# Patient Record
Sex: FEMALE | Race: OTHER | HISPANIC OR LATINO
[De-identification: names, ages, dates, MRNs, and addresses within clinical notes are randomized per-mention and may not be internally consistent; named-entity substitution may affect disease eponyms.]

---

## 2017-01-22 ENCOUNTER — RESULT REVIEW (OUTPATIENT)
Age: 42
End: 2017-01-22

## 2017-01-23 ENCOUNTER — APPOINTMENT (OUTPATIENT)
Dept: UROGYNECOLOGY | Facility: CLINIC | Age: 42
End: 2017-01-23

## 2017-01-23 VITALS
BODY MASS INDEX: 28.43 KG/M2 | SYSTOLIC BLOOD PRESSURE: 100 MMHG | DIASTOLIC BLOOD PRESSURE: 70 MMHG | HEIGHT: 59 IN | WEIGHT: 141 LBS

## 2017-01-23 DIAGNOSIS — R35.1 NOCTURIA: ICD-10-CM

## 2017-01-23 DIAGNOSIS — N39.46 MIXED INCONTINENCE: ICD-10-CM

## 2017-01-24 LAB
APPEARANCE UR: NORMAL
BILIRUB UR QL STRIP: NEGATIVE
COLOR UR: YELLOW
GLUCOSE UR STRIP-MCNC: NEGATIVE MG/DL
HGB UR QL STRIP: NEGATIVE
KETONES UR STRIP-MCNC: NEGATIVE MG/DL
NITRITE UR QL STRIP: NEGATIVE
PH UR STRIP: 5.5
PROT UR STRIP-MCNC: NEGATIVE MG/DL
SP GR UR STRIP: 1.02
UROBILINOGEN UR STRIP-MCNC: 0.2 MG/DL
WBC URNS QL MICRO: NEGATIVE

## 2017-01-26 LAB — BACTERIA UR CULT: NORMAL

## 2017-01-30 ENCOUNTER — MEDICATION RENEWAL (OUTPATIENT)
Age: 42
End: 2017-01-30

## 2017-02-06 ENCOUNTER — OTHER (OUTPATIENT)
Age: 42
End: 2017-02-06

## 2017-02-06 RX ORDER — OXYBUTYNIN CHLORIDE 5 MG/1
5 TABLET ORAL
Qty: 28 | Refills: 0 | Status: DISCONTINUED | COMMUNITY
Start: 2017-01-30 | End: 2017-02-06

## 2017-02-09 ENCOUNTER — APPOINTMENT (OUTPATIENT)
Dept: INTERNAL MEDICINE | Facility: CLINIC | Age: 42
End: 2017-02-09

## 2017-02-23 ENCOUNTER — APPOINTMENT (OUTPATIENT)
Dept: INTERNAL MEDICINE | Facility: CLINIC | Age: 42
End: 2017-02-23

## 2017-02-23 VITALS
DIASTOLIC BLOOD PRESSURE: 84 MMHG | WEIGHT: 144.5 LBS | HEART RATE: 79 BPM | BODY MASS INDEX: 29.13 KG/M2 | HEIGHT: 59 IN | SYSTOLIC BLOOD PRESSURE: 129 MMHG

## 2017-02-23 DIAGNOSIS — K80.20 CALCULUS OF GALLBLADDER W/OUT CHOLECYSTITIS W/OUT OBSTRUCTION: ICD-10-CM

## 2017-02-23 DIAGNOSIS — Z87.42 PERSONAL HISTORY OF OTHER DISEASES OF THE FEMALE GENITAL TRACT: ICD-10-CM

## 2017-03-01 ENCOUNTER — APPOINTMENT (OUTPATIENT)
Dept: UROGYNECOLOGY | Facility: CLINIC | Age: 42
End: 2017-03-01

## 2017-03-10 ENCOUNTER — OUTPATIENT (OUTPATIENT)
Dept: OUTPATIENT SERVICES | Facility: HOSPITAL | Age: 42
LOS: 1 days | Discharge: HOME | End: 2017-03-10

## 2017-03-10 ENCOUNTER — APPOINTMENT (OUTPATIENT)
Dept: SURGERY | Facility: AMBULATORY SURGERY CENTER | Age: 42
End: 2017-03-10

## 2017-03-21 ENCOUNTER — APPOINTMENT (OUTPATIENT)
Dept: SURGERY | Facility: CLINIC | Age: 42
End: 2017-03-21

## 2017-03-24 ENCOUNTER — APPOINTMENT (OUTPATIENT)
Dept: UROGYNECOLOGY | Facility: CLINIC | Age: 42
End: 2017-03-24

## 2017-06-08 ENCOUNTER — APPOINTMENT (OUTPATIENT)
Dept: INTERNAL MEDICINE | Facility: CLINIC | Age: 42
End: 2017-06-08

## 2017-06-22 ENCOUNTER — APPOINTMENT (OUTPATIENT)
Dept: INTERNAL MEDICINE | Facility: CLINIC | Age: 42
End: 2017-06-22

## 2017-06-22 ENCOUNTER — OUTPATIENT (OUTPATIENT)
Dept: OUTPATIENT SERVICES | Facility: HOSPITAL | Age: 42
LOS: 1 days | Discharge: HOME | End: 2017-06-22

## 2017-06-22 VITALS
HEART RATE: 76 BPM | DIASTOLIC BLOOD PRESSURE: 77 MMHG | HEIGHT: 59 IN | WEIGHT: 150 LBS | BODY MASS INDEX: 30.24 KG/M2 | SYSTOLIC BLOOD PRESSURE: 112 MMHG

## 2017-06-22 DIAGNOSIS — N81.10 CYSTOCELE, UNSPECIFIED: ICD-10-CM

## 2017-06-22 DIAGNOSIS — D25.9 LEIOMYOMA OF UTERUS, UNSPECIFIED: ICD-10-CM

## 2017-06-22 DIAGNOSIS — O99.89 OTHER SPECIFIED DISEASES AND CONDITIONS COMPLICATING PREGNANCY, CHILDBIRTH AND THE PUERPERIUM: ICD-10-CM

## 2017-06-23 ENCOUNTER — OUTPATIENT (OUTPATIENT)
Dept: OUTPATIENT SERVICES | Facility: HOSPITAL | Age: 42
LOS: 1 days | Discharge: HOME | End: 2017-06-23

## 2017-06-23 DIAGNOSIS — O99.89 OTHER SPECIFIED DISEASES AND CONDITIONS COMPLICATING PREGNANCY, CHILDBIRTH AND THE PUERPERIUM: ICD-10-CM

## 2017-06-24 ENCOUNTER — RESULT REVIEW (OUTPATIENT)
Age: 42
End: 2017-06-24

## 2017-06-26 ENCOUNTER — OUTPATIENT (OUTPATIENT)
Dept: OUTPATIENT SERVICES | Facility: HOSPITAL | Age: 42
LOS: 1 days | Discharge: HOME | End: 2017-06-26

## 2017-06-26 DIAGNOSIS — O99.89 OTHER SPECIFIED DISEASES AND CONDITIONS COMPLICATING PREGNANCY, CHILDBIRTH AND THE PUERPERIUM: ICD-10-CM

## 2017-06-27 DIAGNOSIS — K80.20 CALCULUS OF GALLBLADDER WITHOUT CHOLECYSTITIS WITHOUT OBSTRUCTION: ICD-10-CM

## 2017-06-27 DIAGNOSIS — K80.10 CALCULUS OF GALLBLADDER WITH CHRONIC CHOLECYSTITIS WITHOUT OBSTRUCTION: ICD-10-CM

## 2017-06-28 DIAGNOSIS — N83.209 UNSPECIFIED OVARIAN CYST, UNSPECIFIED SIDE: ICD-10-CM

## 2017-06-28 DIAGNOSIS — Z12.31 ENCOUNTER FOR SCREENING MAMMOGRAM FOR MALIGNANT NEOPLASM OF BREAST: ICD-10-CM

## 2017-06-28 DIAGNOSIS — N83.291 OTHER OVARIAN CYST, RIGHT SIDE: ICD-10-CM

## 2017-08-24 ENCOUNTER — APPOINTMENT (OUTPATIENT)
Dept: INTERNAL MEDICINE | Facility: CLINIC | Age: 42
End: 2017-08-24

## 2017-11-09 ENCOUNTER — EMERGENCY (EMERGENCY)
Facility: HOSPITAL | Age: 42
LOS: 1 days | Discharge: HOME | End: 2017-11-09
Admitting: INTERNAL MEDICINE

## 2017-11-09 DIAGNOSIS — O99.89 OTHER SPECIFIED DISEASES AND CONDITIONS COMPLICATING PREGNANCY, CHILDBIRTH AND THE PUERPERIUM: ICD-10-CM

## 2017-11-09 DIAGNOSIS — N89.8 OTHER SPECIFIED NONINFLAMMATORY DISORDERS OF VAGINA: ICD-10-CM

## 2017-11-09 DIAGNOSIS — B37.3 CANDIDIASIS OF VULVA AND VAGINA: ICD-10-CM

## 2017-11-16 ENCOUNTER — OUTPATIENT (OUTPATIENT)
Dept: OUTPATIENT SERVICES | Facility: HOSPITAL | Age: 42
LOS: 1 days | Discharge: HOME | End: 2017-11-16

## 2017-11-16 ENCOUNTER — APPOINTMENT (OUTPATIENT)
Dept: INTERNAL MEDICINE | Facility: CLINIC | Age: 42
End: 2017-11-16

## 2017-11-16 VITALS
BODY MASS INDEX: 29.03 KG/M2 | HEIGHT: 59 IN | SYSTOLIC BLOOD PRESSURE: 119 MMHG | DIASTOLIC BLOOD PRESSURE: 84 MMHG | HEART RATE: 66 BPM | WEIGHT: 144 LBS

## 2017-11-16 DIAGNOSIS — R93.8 ABNORMAL FINDINGS ON DIAGNOSTIC IMAGING OF OTHER SPECIFIED BODY STRUCTURES: ICD-10-CM

## 2017-11-16 DIAGNOSIS — N39.3 STRESS INCONTINENCE (FEMALE) (MALE): ICD-10-CM

## 2017-11-16 DIAGNOSIS — O99.89 OTHER SPECIFIED DISEASES AND CONDITIONS COMPLICATING PREGNANCY, CHILDBIRTH AND THE PUERPERIUM: ICD-10-CM

## 2017-11-16 DIAGNOSIS — N83.209 UNSPECIFIED OVARIAN CYST, UNSPECIFIED SIDE: ICD-10-CM

## 2017-11-28 LAB
ALBUMIN SERPL-MCNC: 3.9 G/DL
ALBUMIN/GLOB SERPL: 1.39
ALP SERPL-CCNC: 75 IU/L
ALT SERPL-CCNC: 36 IU/L
ANION GAP SERPL CALC-SCNC: 3 MEQ/L
AST SERPL-CCNC: 22 IU/L
BASOPHILS # BLD: 0.03 TH/MM3
BASOPHILS NFR BLD: 0.5 %
BILIRUB SERPL-MCNC: 0.7 MG/DL
BUN SERPL-MCNC: 8 MG/DL
BUN/CREAT SERPL: 17 %
CALCIUM SERPL-MCNC: 9 MG/DL
CHLORIDE SERPL-SCNC: 110 MEQ/L
CHOLEST SERPL-MCNC: 193 MG/DL
CO2 SERPL-SCNC: 28 MEQ/L
CREAT SERPL-MCNC: 0.47 MG/DL
DIFFERENTIAL METHOD BLD: NORMAL
EOSINOPHIL # BLD: 0.03 TH/MM3
EOSINOPHIL NFR BLD: 0.5 %
ERYTHROCYTE [DISTWIDTH] IN BLOOD BY AUTOMATED COUNT: 13.5 %
ESTIMATED AVERGAGE GLUCOSE (NORTH): 114 MG/DL
GFR SERPL CREATININE-BSD FRML MDRD: 145
GLUCOSE SERPL-MCNC: 95 MG/DL
GRANULOCYTES # BLD: 3.3 TH/MM3
GRANULOCYTES NFR BLD: 50 %
HBA1C MFR BLD: 5.6 %
HCT VFR BLD AUTO: 39.7 %
HDLC SERPL-MCNC: 39 MG/DL
HDLC SERPL: 4.95
HGB BLD-MCNC: 13.3 G/DL
IMM GRANULOCYTES # BLD: 0.01 TH/MM3
IMM GRANULOCYTES NFR BLD: 0.2 %
LDLC SERPL DIRECT ASSAY-MCNC: 104 MG/DL
LYMPHOCYTES # BLD: 2.88 TH/MM3
LYMPHOCYTES NFR BLD: 43.8 %
MCH RBC QN AUTO: 30.5 PG
MCHC RBC AUTO-ENTMCNC: 33.5 G/DL
MCV RBC AUTO: 91.1 FL
MONOCYTES # BLD: 0.33 TH/MM3
MONOCYTES NFR BLD: 5 %
PLATELET # BLD: 270 TH/MM3
PMV BLD AUTO: 10.5 FL
POTASSIUM SERPL-SCNC: 4.6 MMOL/L
PROT SERPL-MCNC: 6.7 G/DL
RBC # BLD AUTO: 4.36 MIL/MM3
SODIUM SERPL-SCNC: 141 MEQ/L
TRIGL SERPL-MCNC: 226 MG/DL
VLDLC SERPL-MCNC: 45 MG/DL
WBC # BLD: 6.58 TH/MM3

## 2017-12-20 ENCOUNTER — OUTPATIENT (OUTPATIENT)
Dept: OUTPATIENT SERVICES | Facility: HOSPITAL | Age: 42
LOS: 1 days | Discharge: HOME | End: 2017-12-20

## 2017-12-21 DIAGNOSIS — M76.60 ACHILLES TENDINITIS, UNSPECIFIED LEG: ICD-10-CM

## 2017-12-21 DIAGNOSIS — M75.00 ADHESIVE CAPSULITIS OF UNSPECIFIED SHOULDER: ICD-10-CM

## 2017-12-27 ENCOUNTER — OUTPATIENT (OUTPATIENT)
Dept: OUTPATIENT SERVICES | Facility: HOSPITAL | Age: 42
LOS: 1 days | Discharge: HOME | End: 2017-12-27

## 2017-12-27 DIAGNOSIS — O99.89 OTHER SPECIFIED DISEASES AND CONDITIONS COMPLICATING PREGNANCY, CHILDBIRTH AND THE PUERPERIUM: ICD-10-CM

## 2017-12-27 DIAGNOSIS — E03.9 HYPOTHYROIDISM, UNSPECIFIED: ICD-10-CM

## 2017-12-27 DIAGNOSIS — E55.9 VITAMIN D DEFICIENCY, UNSPECIFIED: ICD-10-CM

## 2018-01-02 ENCOUNTER — OUTPATIENT (OUTPATIENT)
Dept: OUTPATIENT SERVICES | Facility: HOSPITAL | Age: 43
LOS: 1 days | Discharge: HOME | End: 2018-01-02

## 2018-01-02 DIAGNOSIS — M25.519 PAIN IN UNSPECIFIED SHOULDER: ICD-10-CM

## 2018-01-02 DIAGNOSIS — O99.89 OTHER SPECIFIED DISEASES AND CONDITIONS COMPLICATING PREGNANCY, CHILDBIRTH AND THE PUERPERIUM: ICD-10-CM

## 2018-01-02 DIAGNOSIS — M79.609 PAIN IN UNSPECIFIED LIMB: ICD-10-CM

## 2018-01-05 ENCOUNTER — RESULT REVIEW (OUTPATIENT)
Age: 43
End: 2018-01-05

## 2018-01-05 ENCOUNTER — OUTPATIENT (OUTPATIENT)
Dept: OUTPATIENT SERVICES | Facility: HOSPITAL | Age: 43
LOS: 1 days | Discharge: HOME | End: 2018-01-05

## 2018-01-05 ENCOUNTER — APPOINTMENT (OUTPATIENT)
Dept: INTERNAL MEDICINE | Facility: CLINIC | Age: 43
End: 2018-01-05

## 2018-01-05 VITALS
HEIGHT: 59 IN | BODY MASS INDEX: 30.24 KG/M2 | SYSTOLIC BLOOD PRESSURE: 132 MMHG | HEART RATE: 74 BPM | WEIGHT: 150 LBS | DIASTOLIC BLOOD PRESSURE: 83 MMHG

## 2018-01-05 DIAGNOSIS — Z86.19 PERSONAL HISTORY OF OTHER INFECTIOUS AND PARASITIC DISEASES: ICD-10-CM

## 2018-01-05 DIAGNOSIS — O99.89 OTHER SPECIFIED DISEASES AND CONDITIONS COMPLICATING PREGNANCY, CHILDBIRTH AND THE PUERPERIUM: ICD-10-CM

## 2018-01-05 DIAGNOSIS — Z87.39 PERSONAL HISTORY OF OTHER DISEASES OF THE MUSCULOSKELETAL SYSTEM AND CONNECTIVE TISSUE: ICD-10-CM

## 2018-01-05 DIAGNOSIS — R19.7 DIARRHEA, UNSPECIFIED: ICD-10-CM

## 2018-01-05 DIAGNOSIS — R30.0 DYSURIA: ICD-10-CM

## 2018-01-09 LAB
APPEARANCE UR: NORMAL
BACTERIA UR CULT: NORMAL
BILIRUB UR QL STRIP: NEGATIVE
CAOX CRY URNS QL MICRO: ABNORMAL
COLOR UR: YELLOW
GLUCOSE UR STRIP-MCNC: NEGATIVE MG/DL
HGB UR QL STRIP: NEGATIVE
KETONES UR STRIP-MCNC: NEGATIVE MG/DL
NITRITE UR QL STRIP: NEGATIVE
PH UR STRIP: 5.5
PROT UR STRIP-MCNC: NEGATIVE MG/DL
RBC #/AREA URNS HPF: NORMAL P/HPF
SP GR UR STRIP: 1.02
UROBILINOGEN UR STRIP-MCNC: 0.2 MG/DL
WBC URNS QL MICRO: ABNORMAL
WBC URNS QL MICRO: ABNORMAL P/HPF

## 2018-01-18 ENCOUNTER — OUTPATIENT (OUTPATIENT)
Dept: OUTPATIENT SERVICES | Facility: HOSPITAL | Age: 43
LOS: 1 days | Discharge: HOME | End: 2018-01-18

## 2018-01-18 ENCOUNTER — APPOINTMENT (OUTPATIENT)
Dept: OBGYN | Facility: CLINIC | Age: 43
End: 2018-01-18

## 2018-01-18 ENCOUNTER — APPOINTMENT (OUTPATIENT)
Dept: INTERNAL MEDICINE | Facility: CLINIC | Age: 43
End: 2018-01-18

## 2018-01-18 VITALS
WEIGHT: 149 LBS | DIASTOLIC BLOOD PRESSURE: 77 MMHG | HEART RATE: 65 BPM | SYSTOLIC BLOOD PRESSURE: 110 MMHG | HEIGHT: 59 IN | BODY MASS INDEX: 30.04 KG/M2

## 2018-01-18 VITALS — BODY MASS INDEX: 30.09 KG/M2 | SYSTOLIC BLOOD PRESSURE: 110 MMHG | DIASTOLIC BLOOD PRESSURE: 70 MMHG | WEIGHT: 149 LBS

## 2018-01-18 DIAGNOSIS — O99.89 OTHER SPECIFIED DISEASES AND CONDITIONS COMPLICATING PREGNANCY, CHILDBIRTH AND THE PUERPERIUM: ICD-10-CM

## 2018-01-18 DIAGNOSIS — N76.2 ACUTE VULVITIS: ICD-10-CM

## 2018-01-18 DIAGNOSIS — L81.9 DISORDER OF PIGMENTATION, UNSPECIFIED: ICD-10-CM

## 2018-01-19 ENCOUNTER — APPOINTMENT (OUTPATIENT)
Dept: UROGYNECOLOGY | Facility: CLINIC | Age: 43
End: 2018-01-19

## 2018-01-29 ENCOUNTER — OUTPATIENT (OUTPATIENT)
Dept: OUTPATIENT SERVICES | Facility: HOSPITAL | Age: 43
LOS: 1 days | Discharge: HOME | End: 2018-01-29

## 2018-01-29 DIAGNOSIS — R10.9 UNSPECIFIED ABDOMINAL PAIN: ICD-10-CM

## 2018-02-04 DIAGNOSIS — O99.89 OTHER SPECIFIED DISEASES AND CONDITIONS COMPLICATING PREGNANCY, CHILDBIRTH AND THE PUERPERIUM: ICD-10-CM

## 2018-03-06 ENCOUNTER — APPOINTMENT (OUTPATIENT)
Dept: DERMATOLOGY | Facility: CLINIC | Age: 43
End: 2018-03-06

## 2018-07-12 ENCOUNTER — APPOINTMENT (OUTPATIENT)
Dept: OBGYN | Facility: CLINIC | Age: 43
End: 2018-07-12

## 2018-07-27 PROBLEM — N39.46 MIXED INCONTINENCE: Status: ACTIVE | Noted: 2017-01-23

## 2018-08-02 ENCOUNTER — OUTPATIENT (OUTPATIENT)
Dept: OUTPATIENT SERVICES | Facility: HOSPITAL | Age: 43
LOS: 1 days | Discharge: HOME | End: 2018-08-02

## 2018-08-02 ENCOUNTER — APPOINTMENT (OUTPATIENT)
Dept: OBGYN | Facility: CLINIC | Age: 43
End: 2018-08-02

## 2018-08-02 VITALS
HEIGHT: 59 IN | SYSTOLIC BLOOD PRESSURE: 120 MMHG | WEIGHT: 143.31 LBS | DIASTOLIC BLOOD PRESSURE: 80 MMHG | BODY MASS INDEX: 28.89 KG/M2

## 2018-08-02 DIAGNOSIS — N83.209 UNSPECIFIED OVARIAN CYST, UNSPECIFIED SIDE: ICD-10-CM

## 2018-08-07 ENCOUNTER — LABORATORY RESULT (OUTPATIENT)
Age: 43
End: 2018-08-07

## 2018-08-07 DIAGNOSIS — Z31.69 ENCOUNTER FOR OTHER GENERAL COUNSELING AND ADVICE ON PROCREATION: ICD-10-CM

## 2018-08-16 ENCOUNTER — OTHER (OUTPATIENT)
Age: 43
End: 2018-08-16

## 2018-08-16 ENCOUNTER — APPOINTMENT (OUTPATIENT)
Dept: INTERNAL MEDICINE | Facility: CLINIC | Age: 43
End: 2018-08-16

## 2018-08-17 ENCOUNTER — RESULT REVIEW (OUTPATIENT)
Age: 43
End: 2018-08-17

## 2018-08-17 LAB
TESTOST BND SERPL-MCNC: 0.5 PG/ML
TESTOST SERPL-MCNC: 23.7 NG/DL

## 2018-08-25 ENCOUNTER — LABORATORY RESULT (OUTPATIENT)
Age: 43
End: 2018-08-25

## 2018-08-27 LAB
ESTRADIOL SERPL-MCNC: 28 PG/ML
FSH SERPL-MCNC: 6.3 IU/L
TSH SERPL-ACNC: 3.44 UIU/ML

## 2018-08-31 LAB — ANTI-MUELLERIAN HORMONE: 1.96 NG/ML

## 2018-09-04 ENCOUNTER — APPOINTMENT (OUTPATIENT)
Dept: ANTEPARTUM | Facility: CLINIC | Age: 43
End: 2018-09-04

## 2018-09-06 ENCOUNTER — APPOINTMENT (OUTPATIENT)
Dept: OBGYN | Facility: CLINIC | Age: 43
End: 2018-09-06

## 2018-09-06 ENCOUNTER — OUTPATIENT (OUTPATIENT)
Dept: OUTPATIENT SERVICES | Facility: HOSPITAL | Age: 43
LOS: 1 days | Discharge: HOME | End: 2018-09-06

## 2018-09-06 VITALS
BODY MASS INDEX: 28.63 KG/M2 | WEIGHT: 142 LBS | DIASTOLIC BLOOD PRESSURE: 78 MMHG | HEIGHT: 59 IN | SYSTOLIC BLOOD PRESSURE: 120 MMHG

## 2018-09-07 DIAGNOSIS — Z31.69 ENCOUNTER FOR OTHER GENERAL COUNSELING AND ADVICE ON PROCREATION: ICD-10-CM

## 2018-11-08 ENCOUNTER — APPOINTMENT (OUTPATIENT)
Dept: OBGYN | Facility: CLINIC | Age: 43
End: 2018-11-08

## 2019-02-15 ENCOUNTER — APPOINTMENT (OUTPATIENT)
Dept: OBGYN | Facility: CLINIC | Age: 44
End: 2019-02-15

## 2019-03-19 ENCOUNTER — APPOINTMENT (OUTPATIENT)
Dept: INTERNAL MEDICINE | Facility: CLINIC | Age: 44
End: 2019-03-19

## 2019-05-10 ENCOUNTER — APPOINTMENT (OUTPATIENT)
Dept: OBGYN | Facility: CLINIC | Age: 44
End: 2019-05-10

## 2019-07-17 ENCOUNTER — OUTPATIENT (OUTPATIENT)
Dept: OUTPATIENT SERVICES | Facility: HOSPITAL | Age: 44
LOS: 1 days | Discharge: HOME | End: 2019-07-17
Payer: SELF-PAY

## 2019-07-17 DIAGNOSIS — Z12.31 ENCOUNTER FOR SCREENING MAMMOGRAM FOR MALIGNANT NEOPLASM OF BREAST: ICD-10-CM

## 2019-07-17 PROCEDURE — 77067 SCR MAMMO BI INCL CAD: CPT | Mod: 26

## 2019-07-17 PROCEDURE — 77063 BREAST TOMOSYNTHESIS BI: CPT | Mod: 26

## 2019-08-17 ENCOUNTER — OUTPATIENT (OUTPATIENT)
Dept: OUTPATIENT SERVICES | Facility: HOSPITAL | Age: 44
LOS: 1 days | Discharge: HOME | End: 2019-08-17
Payer: SUBSIDIZED

## 2019-08-17 DIAGNOSIS — M75.00 ADHESIVE CAPSULITIS OF UNSPECIFIED SHOULDER: ICD-10-CM

## 2019-08-17 DIAGNOSIS — R10.2 PELVIC AND PERINEAL PAIN: ICD-10-CM

## 2019-08-17 PROCEDURE — 76856 US EXAM PELVIC COMPLETE: CPT | Mod: 26

## 2019-08-17 PROCEDURE — 73030 X-RAY EXAM OF SHOULDER: CPT | Mod: 26,RT

## 2019-08-17 PROCEDURE — 76700 US EXAM ABDOM COMPLETE: CPT | Mod: 26

## 2019-10-02 ENCOUNTER — OUTPATIENT (OUTPATIENT)
Dept: OUTPATIENT SERVICES | Facility: HOSPITAL | Age: 44
LOS: 1 days | Discharge: HOME | End: 2019-10-02
Payer: SUBSIDIZED

## 2019-10-02 ENCOUNTER — OUTPATIENT (OUTPATIENT)
Dept: OUTPATIENT SERVICES | Facility: HOSPITAL | Age: 44
LOS: 1 days | Discharge: HOME | End: 2019-10-02

## 2019-10-02 ENCOUNTER — APPOINTMENT (OUTPATIENT)
Dept: ORTHOPEDIC SURGERY | Facility: CLINIC | Age: 44
End: 2019-10-02

## 2019-10-02 DIAGNOSIS — M19.011 PRIMARY OSTEOARTHRITIS, RIGHT SHOULDER: ICD-10-CM

## 2019-10-02 PROCEDURE — 92020 GONIOSCOPY: CPT

## 2019-10-02 PROCEDURE — 92004 COMPRE OPH EXAM NEW PT 1/>: CPT

## 2019-10-15 DIAGNOSIS — H11.053 PERIPHERAL PTERYGIUM, PROGRESSIVE, BILATERAL: ICD-10-CM

## 2019-10-15 DIAGNOSIS — H40.013 OPEN ANGLE WITH BORDERLINE FINDINGS, LOW RISK, BILATERAL: ICD-10-CM

## 2019-10-15 DIAGNOSIS — H04.123 DRY EYE SYNDROME OF BILATERAL LACRIMAL GLANDS: ICD-10-CM

## 2019-10-16 ENCOUNTER — OUTPATIENT (OUTPATIENT)
Dept: OUTPATIENT SERVICES | Facility: HOSPITAL | Age: 44
LOS: 1 days | Discharge: HOME | End: 2019-10-16
Payer: SUBSIDIZED

## 2019-10-16 DIAGNOSIS — M54.2 CERVICALGIA: ICD-10-CM

## 2019-10-16 PROCEDURE — 72050 X-RAY EXAM NECK SPINE 4/5VWS: CPT | Mod: 26

## 2019-11-06 ENCOUNTER — EMERGENCY (EMERGENCY)
Facility: HOSPITAL | Age: 44
LOS: 0 days | Discharge: HOME | End: 2019-11-06
Admitting: EMERGENCY MEDICINE
Payer: SUBSIDIZED

## 2019-11-06 VITALS
HEART RATE: 81 BPM | RESPIRATION RATE: 18 BRPM | TEMPERATURE: 96 F | DIASTOLIC BLOOD PRESSURE: 75 MMHG | SYSTOLIC BLOOD PRESSURE: 118 MMHG | OXYGEN SATURATION: 98 %

## 2019-11-06 DIAGNOSIS — M54.42 LUMBAGO WITH SCIATICA, LEFT SIDE: ICD-10-CM

## 2019-11-06 DIAGNOSIS — M54.9 DORSALGIA, UNSPECIFIED: ICD-10-CM

## 2019-11-06 PROCEDURE — 99283 EMERGENCY DEPT VISIT LOW MDM: CPT

## 2019-11-06 RX ORDER — IBUPROFEN 200 MG
1 TABLET ORAL
Qty: 42 | Refills: 0
Start: 2019-11-06 | End: 2019-11-19

## 2019-11-06 RX ORDER — METHOCARBAMOL 500 MG/1
1 TABLET, FILM COATED ORAL
Qty: 21 | Refills: 0
Start: 2019-11-06 | End: 2019-11-12

## 2019-11-06 NOTE — ED PROVIDER NOTE - PATIENT PORTAL LINK FT
You can access the FollowMyHealth Patient Portal offered by [facility] by registering at the following website: [email or web address]. By joining [employer]’s FollowMyHealth portal, you will also be able to view your health information using other applications (apps) compatible with our system.

## 2019-11-06 NOTE — ED PROVIDER NOTE - NSFOLLOWUPCLINICS_GEN_ALL_ED_FT
[facility] ([facility])  Rehabilitation  [facility] [address], [address] [place] [address]  Phone: [phone]  Fax:   Follow Up Time: 1-3 Days    [facility]  Neurology  [address] [place] [address], [address]  [place] [address]  Phone: [phone]  Fax:   Follow Up Time: 1-3 Days

## 2019-11-06 NOTE — ED ADULT NURSE NOTE - OBJECTIVE STATEMENT
Pt reports lower back and leg pain for two days. No s/s distress observed. Pt reports lower back and leg pain for two days. No s/s distress observed, pt denies fall denies injury, pt ambulatory with steady gait.

## 2019-11-06 NOTE — ED PROVIDER NOTE - CARE PLAN
Principal Discharge DX:	Acute left-sided low back pain with right-sided sciatica Principal Discharge DX:	Left-sided low back pain with left-sided sciatica, unspecified chronicity

## 2019-11-06 NOTE — ED PROVIDER NOTE - PRINCIPAL DIAGNOSIS
Acute left-sided low back pain with right-sided sciatica Left-sided low back pain with left-sided sciatica, unspecified chronicity

## 2019-11-06 NOTE — ED PROVIDER NOTE - MUSCULOSKELETAL, MLM
Spine appears normal, range of motion is not limited, no midline tenderness.  (+) Left lower paraspinal tenderness.  (+) straight leg raise Left.

## 2019-11-06 NOTE — ED PROVIDER NOTE - CLINICAL SUMMARY MEDICAL DECISION MAKING FREE TEXT BOX
NSAIDS and muscle relaxers as needed-benefits and side effects of medications discussed and understood; rehab referral; pt will f/u with PCP in 2-3 days; any new or worsening symptoms, pt will return to ER.

## 2019-12-26 ENCOUNTER — OUTPATIENT (OUTPATIENT)
Dept: OUTPATIENT SERVICES | Facility: HOSPITAL | Age: 44
LOS: 1 days | Discharge: HOME | End: 2019-12-26

## 2020-02-03 DIAGNOSIS — M54.2 CERVICALGIA: ICD-10-CM

## 2020-03-12 ENCOUNTER — OUTPATIENT (OUTPATIENT)
Dept: OUTPATIENT SERVICES | Facility: HOSPITAL | Age: 45
LOS: 1 days | Discharge: HOME | End: 2020-03-12

## 2020-12-16 PROBLEM — Z86.19 HISTORY OF CANDIDIASIS OF VAGINA: Status: RESOLVED | Noted: 2017-11-16 | Resolved: 2020-12-16

## 2021-06-17 ENCOUNTER — OUTPATIENT (OUTPATIENT)
Dept: OUTPATIENT SERVICES | Facility: HOSPITAL | Age: 46
LOS: 1 days | Discharge: HOME | End: 2021-06-17
Payer: OTHER GOVERNMENT

## 2021-06-17 DIAGNOSIS — Z12.31 ENCOUNTER FOR SCREENING MAMMOGRAM FOR MALIGNANT NEOPLASM OF BREAST: ICD-10-CM

## 2021-06-17 PROCEDURE — 77063 BREAST TOMOSYNTHESIS BI: CPT | Mod: 26

## 2021-06-17 PROCEDURE — 77067 SCR MAMMO BI INCL CAD: CPT | Mod: 26

## 2021-07-01 ENCOUNTER — OUTPATIENT (OUTPATIENT)
Dept: OUTPATIENT SERVICES | Facility: HOSPITAL | Age: 46
LOS: 1 days | Discharge: HOME | End: 2021-07-01
Payer: OTHER GOVERNMENT

## 2021-07-01 DIAGNOSIS — R92.8 OTHER ABNORMAL AND INCONCLUSIVE FINDINGS ON DIAGNOSTIC IMAGING OF BREAST: ICD-10-CM

## 2021-07-01 PROCEDURE — G0279: CPT | Mod: 26

## 2021-07-01 PROCEDURE — 76642 ULTRASOUND BREAST LIMITED: CPT | Mod: 26,LT

## 2021-07-01 PROCEDURE — 77065 DX MAMMO INCL CAD UNI: CPT | Mod: 26,LT
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
## 2021-09-04 ENCOUNTER — EMERGENCY (EMERGENCY)
Facility: HOSPITAL | Age: 46
LOS: 0 days | Discharge: HOME | End: 2021-09-04
Attending: EMERGENCY MEDICINE | Admitting: EMERGENCY MEDICINE
Payer: MEDICAID

## 2021-09-04 VITALS
TEMPERATURE: 98 F | OXYGEN SATURATION: 100 % | DIASTOLIC BLOOD PRESSURE: 65 MMHG | RESPIRATION RATE: 16 BRPM | HEART RATE: 80 BPM | SYSTOLIC BLOOD PRESSURE: 123 MMHG

## 2021-09-04 DIAGNOSIS — Z87.440 PERSONAL HISTORY OF URINARY (TRACT) INFECTIONS: ICD-10-CM

## 2021-09-04 DIAGNOSIS — N39.0 URINARY TRACT INFECTION, SITE NOT SPECIFIED: Chronic | ICD-10-CM

## 2021-09-04 DIAGNOSIS — R30.0 DYSURIA: ICD-10-CM

## 2021-09-04 DIAGNOSIS — R11.0 NAUSEA: ICD-10-CM

## 2021-09-04 DIAGNOSIS — N12 TUBULO-INTERSTITIAL NEPHRITIS, NOT SPECIFIED AS ACUTE OR CHRONIC: ICD-10-CM

## 2021-09-04 DIAGNOSIS — N39.0 URINARY TRACT INFECTION, SITE NOT SPECIFIED: ICD-10-CM

## 2021-09-04 DIAGNOSIS — R10.31 RIGHT LOWER QUADRANT PAIN: ICD-10-CM

## 2021-09-04 LAB
ALBUMIN SERPL ELPH-MCNC: 4.4 G/DL — SIGNIFICANT CHANGE UP (ref 3.5–5.2)
ALP SERPL-CCNC: 119 U/L — HIGH (ref 30–115)
ALT FLD-CCNC: 46 U/L — HIGH (ref 0–41)
ANION GAP SERPL CALC-SCNC: 11 MMOL/L — SIGNIFICANT CHANGE UP (ref 7–14)
APPEARANCE UR: ABNORMAL
AST SERPL-CCNC: 46 U/L — HIGH (ref 0–41)
BACTERIA # UR AUTO: ABNORMAL
BASOPHILS # BLD AUTO: 0.03 K/UL — SIGNIFICANT CHANGE UP (ref 0–0.2)
BASOPHILS NFR BLD AUTO: 0.3 % — SIGNIFICANT CHANGE UP (ref 0–1)
BILIRUB SERPL-MCNC: 0.4 MG/DL — SIGNIFICANT CHANGE UP (ref 0.2–1.2)
BILIRUB UR-MCNC: NEGATIVE — SIGNIFICANT CHANGE UP
BUN SERPL-MCNC: 6 MG/DL — LOW (ref 10–20)
CALCIUM SERPL-MCNC: 9 MG/DL — SIGNIFICANT CHANGE UP (ref 8.5–10.1)
CHLORIDE SERPL-SCNC: 100 MMOL/L — SIGNIFICANT CHANGE UP (ref 98–110)
CO2 SERPL-SCNC: 24 MMOL/L — SIGNIFICANT CHANGE UP (ref 17–32)
COLOR SPEC: YELLOW — SIGNIFICANT CHANGE UP
CREAT SERPL-MCNC: 0.5 MG/DL — LOW (ref 0.7–1.5)
DIFF PNL FLD: ABNORMAL
EOSINOPHIL # BLD AUTO: 0.01 K/UL — SIGNIFICANT CHANGE UP (ref 0–0.7)
EOSINOPHIL NFR BLD AUTO: 0.1 % — SIGNIFICANT CHANGE UP (ref 0–8)
EPI CELLS # UR: 2 /HPF — SIGNIFICANT CHANGE UP (ref 0–5)
GLUCOSE SERPL-MCNC: 88 MG/DL — SIGNIFICANT CHANGE UP (ref 70–99)
GLUCOSE UR QL: NEGATIVE — SIGNIFICANT CHANGE UP
HCG SERPL QL: NEGATIVE — SIGNIFICANT CHANGE UP
HCT VFR BLD CALC: 39.3 % — SIGNIFICANT CHANGE UP (ref 37–47)
HGB BLD-MCNC: 13.2 G/DL — SIGNIFICANT CHANGE UP (ref 12–16)
HYALINE CASTS # UR AUTO: 1 /LPF — SIGNIFICANT CHANGE UP (ref 0–7)
IMM GRANULOCYTES NFR BLD AUTO: 0.3 % — SIGNIFICANT CHANGE UP (ref 0.1–0.3)
KETONES UR-MCNC: NEGATIVE — SIGNIFICANT CHANGE UP
LACTATE SERPL-SCNC: 0.9 MMOL/L — SIGNIFICANT CHANGE UP (ref 0.7–2)
LEUKOCYTE ESTERASE UR-ACNC: ABNORMAL
LYMPHOCYTES # BLD AUTO: 18.7 % — LOW (ref 20.5–51.1)
LYMPHOCYTES # BLD AUTO: 2.14 K/UL — SIGNIFICANT CHANGE UP (ref 1.2–3.4)
MCHC RBC-ENTMCNC: 30.1 PG — SIGNIFICANT CHANGE UP (ref 27–31)
MCHC RBC-ENTMCNC: 33.6 G/DL — SIGNIFICANT CHANGE UP (ref 32–37)
MCV RBC AUTO: 89.5 FL — SIGNIFICANT CHANGE UP (ref 81–99)
MONOCYTES # BLD AUTO: 0.62 K/UL — HIGH (ref 0.1–0.6)
MONOCYTES NFR BLD AUTO: 5.4 % — SIGNIFICANT CHANGE UP (ref 1.7–9.3)
NEUTROPHILS # BLD AUTO: 8.59 K/UL — HIGH (ref 1.4–6.5)
NEUTROPHILS NFR BLD AUTO: 75.2 % — SIGNIFICANT CHANGE UP (ref 42.2–75.2)
NITRITE UR-MCNC: POSITIVE
NRBC # BLD: 0 /100 WBCS — SIGNIFICANT CHANGE UP (ref 0–0)
PH UR: 6.5 — SIGNIFICANT CHANGE UP (ref 5–8)
PLATELET # BLD AUTO: 298 K/UL — SIGNIFICANT CHANGE UP (ref 130–400)
POTASSIUM SERPL-MCNC: 3.7 MMOL/L — SIGNIFICANT CHANGE UP (ref 3.5–5)
POTASSIUM SERPL-SCNC: 3.7 MMOL/L — SIGNIFICANT CHANGE UP (ref 3.5–5)
PROT SERPL-MCNC: 7.3 G/DL — SIGNIFICANT CHANGE UP (ref 6–8)
PROT UR-MCNC: ABNORMAL
RBC # BLD: 4.39 M/UL — SIGNIFICANT CHANGE UP (ref 4.2–5.4)
RBC # FLD: 13.5 % — SIGNIFICANT CHANGE UP (ref 11.5–14.5)
RBC CASTS # UR COMP ASSIST: 11 /HPF — HIGH (ref 0–4)
SODIUM SERPL-SCNC: 135 MMOL/L — SIGNIFICANT CHANGE UP (ref 135–146)
SP GR SPEC: 1.02 — SIGNIFICANT CHANGE UP (ref 1.01–1.03)
UROBILINOGEN FLD QL: SIGNIFICANT CHANGE UP
WBC # BLD: 11.43 K/UL — HIGH (ref 4.8–10.8)
WBC # FLD AUTO: 11.43 K/UL — HIGH (ref 4.8–10.8)
WBC UR QL: 194 /HPF — HIGH (ref 0–5)

## 2021-09-04 PROCEDURE — 99285 EMERGENCY DEPT VISIT HI MDM: CPT

## 2021-09-04 PROCEDURE — 74177 CT ABD & PELVIS W/CONTRAST: CPT | Mod: 26,MA

## 2021-09-04 RX ORDER — CEFPODOXIME PROXETIL 100 MG
1 TABLET ORAL
Qty: 20 | Refills: 0
Start: 2021-09-04 | End: 2021-09-13

## 2021-09-04 RX ORDER — SODIUM CHLORIDE 9 MG/ML
1000 INJECTION, SOLUTION INTRAVENOUS ONCE
Refills: 0 | Status: COMPLETED | OUTPATIENT
Start: 2021-09-04 | End: 2021-09-04

## 2021-09-04 RX ORDER — CEFTRIAXONE 500 MG/1
1000 INJECTION, POWDER, FOR SOLUTION INTRAMUSCULAR; INTRAVENOUS ONCE
Refills: 0 | Status: COMPLETED | OUTPATIENT
Start: 2021-09-04 | End: 2021-09-04

## 2021-09-04 RX ADMIN — SODIUM CHLORIDE 1000 MILLILITER(S): 9 INJECTION, SOLUTION INTRAVENOUS at 14:57

## 2021-09-04 RX ADMIN — CEFTRIAXONE 100 MILLIGRAM(S): 500 INJECTION, POWDER, FOR SOLUTION INTRAMUSCULAR; INTRAVENOUS at 16:11

## 2021-09-04 RX ADMIN — SODIUM CHLORIDE 1000 MILLILITER(S): 9 INJECTION, SOLUTION INTRAVENOUS at 16:00

## 2021-09-04 NOTE — ED PROVIDER NOTE - ATTENDING CONTRIBUTION TO CARE
46 year old female, no pmhx, presenting with urinary frequency and right flank pain x several days. Flank pain is burning, non-radiating, no palliative or provocative factors, mild severity. Denies having this before. Otherwise denies vaginal bleeding/discharge, fevers, N/V or any other complaints.    Vital Signs: I have reviewed the initial vital signs.  Constitutional: NAD, well-nourished, appears stated age, no acute distress.  HEENT: Airway patent, moist MM, no erythema/swelling/deformity of oral structures. EOMI, PERRLA.  CV: regular rate, regular rhythm, well-perfused extremities, 2+ b/l DP and radial pulses equal.  Lungs: BCTA, no increased WOB.  ABD: NTND, no guarding or rebound, no pulsatile mass, no hernias. (+) Right CVA tenderness  MSK: Neck supple, nontender, nl ROM, no stepoff. Chest nontender. Back nontender in TLS spine or to b/l bony structures or flanks. Ext nontender, nl rom, no deformity.   INTEG: Skin warm, dry, no rash.  NEURO: A&Ox3, normal strength, nl sensation throughout, normal speech.   PSYCH: Calm, cooperative, normal affect and interaction.    Will obtain labs, UA, urine cx, CT abd/pelvis, give IVF, symptomatic control PRN, re-eval.

## 2021-09-04 NOTE — ED PROVIDER NOTE - PHYSICAL EXAMINATION
CONSTITUTIONAL: Well-developed; well-nourished; in no acute distress, nontoxic appearing  SKIN: skin exam is warm and dry  HEAD: Normocephalic; atraumatic  ENT: Dry MM   NECK: ROM intact.  CARD: S1, S2 normal, no murmur  RESP: No wheezes, rales or rhonchi. Good air movement bilaterally  ABD: soft; +suprapubic TTP, no rebound/guarding. +R CVAT.   : Speculum: No vaginal bleeding/discharge, os closed. Bimanaul: No CMT, no adnexal TTP. Chaperone: PCA   EXT: Normal ROM.   NEURO: awake, alert, following commands, oriented, grossly unremarkable. No Focal deficits. GCS 15.   PSYCH: Cooperative, appropriate.

## 2021-09-04 NOTE — ED PROVIDER NOTE - WET READ LAUNCH FT
----- Message from Jose Rice DO sent at 6/12/2018  4:55 PM CDT -----  X-ray shows no acute fracture or dislocation but does show some mild degenerative changes. Patient has lateral meniscal calcification, sometimes seen with CPPD or calcium pyrophosphate crystal deposition.  I will provide patient referral to rheumatology for possible CPPD who may choose to perform an intraarticular joint aspiration to confirm patient has CPPD. Patient in the meanwhile is ok to restart meloxicam. Please provide patient with refil of meloxicam if patient does not have any.Thank you   There are no Wet Read(s) to document.

## 2021-09-04 NOTE — ED PROVIDER NOTE - NS ED ROS FT
Review of Systems:  	•	CONSTITUTIONAL: no fever  	•	SKIN: no rash  	•	EYES: no eye pain, no blurry vision  	•	ENT: no change in hearing, no tinnitus   	•	RESPIRATORY: no shortness of breath, no cough  	•	CARDIAC: no chest pain, no palpitations  	•	GI: +abd pain, +nausea, no vomiting, no diarrhea  	•	GENITO-URINARY: +urgency, +frequency. no dysuria, no hematuria  	•	MUSCULOSKELETAL: no joint paint, no swelling, no redness  	•	NEUROLOGIC: no weakness, no headache  	•	PSYCH: no anxiety, non suicidal, non homicidal, no hallucination, no depression

## 2021-09-04 NOTE — ED PROVIDER NOTE - PATIENT PORTAL LINK FT
You can access the FollowMyHealth Patient Portal offered by [facility] by registering at the following website: [email or web address]. By joining [employer]’s FollowMyHealth portal, you will also be able to view your health information using other applications (apps) compatible with our system.

## 2021-09-04 NOTE — ED PROVIDER NOTE - CARE PROVIDERS DIRECT ADDRESSES
[email or web address]

## 2021-09-04 NOTE — ED ADULT TRIAGE NOTE - CHIEF COMPLAINT QUOTE
Patient presents to ED with complaints of dysuria and right pelvic pain radiating to the right lower back since yesterday. Patient denies fever.

## 2021-09-04 NOTE — ED PROVIDER NOTE - NSFOLLOWUPINSTRUCTIONS_ED_ALL_ED_FT
Please follow up with your primary care doctor in 1-3 days  Please be aware of any new or worsening signs or symptoms that should prompt your return to the ER.      ¿Qué son las vías urinarias?  Las vías urinarias son el norman de órganos del cuerpo que se ocupan de la orina (figura 1), e incluyen:    ?Los riñones, dos órganos con forma de frijol que filtran la katia para producir orina    ?La vejiga, un órgano con forma de globo que almacena la orina    ?Los uréteres, dos tubos que llevan la orina desde los riñones a la vejiga    ?La uretra, el tubo que lleva la orina desde la vejiga al exterior del cuerpo    ¿Qué son las infecciones de las vías urinarias?  Las infecciones de las vías urinarias son infecciones que afectan ya sea a la vejiga o a los riñones:    ?Las infecciones en la vejiga son más comunes que las infecciones en los riñones. Se producen cuando entran bacterias en la uretra y se desplazan hacia arriba, hasta la vejiga. El término médico para referirse a helen infección en la vejiga es "cistitis".    ?Las infecciones de riñón ocurren cuando las bacterias se desplazan incluso más arriba, hasta los riñones. El término médico para referirse a helen infección en los riñones es "pielonefritis".    Tanto las infecciones en la vejiga lashon en los riñones son más comunes en las mujeres que en los hombres.    ¿Cuáles son los síntomas de helen infección en la vejiga?  Los síntomas son, entre otros:    ?Dolor o ardor al orinar    ?Necesidad de orinar con frecuencia    ?Necesidad de orinar en forma repentina o urgente    ?Katia en la orina    ¿Cuáles son los síntomas de helen infección en el riñón?  Los síntomas de helen infección en el riñón pueden incluir los síntomas de helen infección en la vejiga, malaika las infecciones en el riñón también pueden causar:    ?Fiebre    ?Dolor de espalda    ?Náuseas o vómitos    ¿Cómo sé si tengo helen infección de las vías urinarias?  Llame a baxter médico o enfermero. En ocasiones, él puede determinar si usted tiene helen infección de las vías urinarias con solo conocer birgit síntomas.    Baxter médico o enfermero podría hacerle helen simple prueba de orina. Si piensa que usted puede tener helen infección en el riñón o no está seguro de lo que usted tiene, podría hacer helen prueba de orina más completa para verificar si hay bacterias.    ¿Cómo se tratan las infecciones de las vías urinarias?  La mayoría de las infecciones de las vías urinarias se tratan con píldoras de antibióticos. Estas píldoras actúan matando los gérmenes que causan la infección.    Si tiene helen infección en la vejiga, probablemente tenga que tracie antibióticos pedro 3 a 7 días. Si tiene helen infección en los riñones, probablemente necesite tracie antibióticos más tiempo (quizás hasta pedro 2 semanas). Si tiene helen infección en los riñones, también es posible que necesite recibir tratamiento en el hospital.    Birgit síntomas deberían mejorar al día siguiente de estar tomando antibióticos, malaika debe terminar todas las píldoras de antibiótico que le den. De lo contrario, la infección podría regresar.    Si es necesario, también puede tracie helen medicina para anestesiar baxter vejiga. Esta medicina disminuye el dolor que causan las infecciones urinarias. También reduce el deseo de orinar.    ¿Qué pasa si sufro infecciones frecuentes en la vejiga?  Jolene, consulte a baxter médico o enfermero para verificar si realmente se trata de infecciones en la vejiga. Los síntomas de helen infección en la vejiga pueden ser causados por otras cosas. El médico o enfermero querrá robert si esos problemas pueden estar causando birgit síntomas.    Sin embargo, si realmente tiene infecciones frecuentes, hay cosas que puede hacer para evitarlas, lashon por ejemplo:    ?Evitar los espermicidas (cremas que simeon el esperma) – Los espermicidas son un método de planificación familiar. Al parecer, aumentan el riesgo de tener infecciones en la vejiga para algunas mujeres, especialmente en aquellas que usan un diafragma. Si usa espermicidas y tiene muchas infecciones en la vejiga, podría ser conveniente que cambie a otro método de planificación familiar.    ?Beber más líquido – Cora puede ayudar a prevenir las infecciones en la vejiga.    ?Orinar después de tener relaciones sexuales – Algunos médicos piensan que esto ayuda a expulsar los gérmenes que pueden radhika ingresado a la vejiga pedro la relación sexual. No hay evidencia de que esto funcione, malaika tampoco puede hacer daño.    ?Estrógeno vaginal – Si es anuel y ya ha tenido la menopausia, el médico podría sugerir domi tratamiento. El estrógeno vaginal viene en forma de crema o anillo flexible que se coloca en la vagina. Puede ayudar a prevenir las infecciones en la vejiga.    ?Antibióticos – Si tiene muchas infecciones en la vejiga y los métodos anteriores no uribe dado resultado, el médico podría darle antibióticos para prevenir las infecciones. Sin embargo, los antibióticos tienen desventajas, por lo que generalmente los médicos jolene sugieren otros métodos.    ¿Pueden el jugo de arándanos u otros derivados del arándano prevenir o tratar las infecciones en la vejiga?  Los estudios según los cuales los productos de arándano previenen estas infecciones no son muy rigurosos. Otros estudios sugieren que los productos de arándano no previenen infecciones en la vejiga. Sin embargo, si quiere puede consumir productos con arándano para esto. Es probable que no sea perjudicial.

## 2021-09-04 NOTE — ED PROVIDER NOTE - NSICDXPASTSURGICALHX_GEN_ALL_CORE_FT
PAST SURGICAL HISTORY:  UTI (urinary tract infection)      PAST SURGICAL HISTORY:  No significant past surgical history

## 2021-09-04 NOTE — ED PROVIDER NOTE - CARE PROVIDER_API CALL
Tomás Lion  INTERNAL MEDICINE  [address] [place] [id number]  Phone: [phone]  Fax: [phone]  Follow Up Time: 1-3 Days

## 2021-09-04 NOTE — ED PROVIDER NOTE - NSICDXPASTMEDICALHX_GEN_ALL_CORE_FT
PAST MEDICAL HISTORY:  Pyelitis      PAST MEDICAL HISTORY:  Pyelitis     Urinary tract infection

## 2021-09-04 NOTE — ED PROVIDER NOTE - PROGRESS NOTE DETAILS
: [id number]  results discussed with patient, given strict return precautions, verbalizes understanding of plan. sent rx to pharmacy.

## 2021-09-04 NOTE — ED PROVIDER NOTE - OBJECTIVE STATEMENT
46 year old female, no past medical history, who presents with urinary symptoms. patient reports urgency/frequency xseveral days with right sided flank pain. patient also reports vaginal discomfort intermittently, denies discharge/bleeding. reports intermittent nausea, no vomiting. denies fever, chills, chest pain, SOB, bowel changes. no hx abd surgeries. no hx kidney stones. LMP x2 weeks ago.

## 2022-05-23 ENCOUNTER — EMERGENCY (EMERGENCY)
Facility: HOSPITAL | Age: 47
LOS: 0 days | Discharge: HOME | End: 2022-05-23
Attending: EMERGENCY MEDICINE | Admitting: EMERGENCY MEDICINE
Payer: MEDICAID

## 2022-05-23 VITALS
HEART RATE: 75 BPM | RESPIRATION RATE: 18 BRPM | TEMPERATURE: 98 F | SYSTOLIC BLOOD PRESSURE: 129 MMHG | OXYGEN SATURATION: 99 % | DIASTOLIC BLOOD PRESSURE: 63 MMHG

## 2022-05-23 DIAGNOSIS — M79.605 PAIN IN LEFT LEG: ICD-10-CM

## 2022-05-23 DIAGNOSIS — Z87.448 PERSONAL HISTORY OF OTHER DISEASES OF URINARY SYSTEM: ICD-10-CM

## 2022-05-23 DIAGNOSIS — M25.562 PAIN IN LEFT KNEE: ICD-10-CM

## 2022-05-23 DIAGNOSIS — M79.606 PAIN IN LEG, UNSPECIFIED: ICD-10-CM

## 2022-05-23 DIAGNOSIS — M25.462 EFFUSION, LEFT KNEE: ICD-10-CM

## 2022-05-23 PROBLEM — N12 TUBULO-INTERSTITIAL NEPHRITIS, NOT SPECIFIED AS ACUTE OR CHRONIC: Chronic | Status: ACTIVE | Noted: 2021-09-04

## 2022-05-23 PROBLEM — N39.0 URINARY TRACT INFECTION, SITE NOT SPECIFIED: Chronic | Status: ACTIVE | Noted: 2021-09-08

## 2022-05-23 LAB
ALBUMIN SERPL ELPH-MCNC: 4.2 G/DL — SIGNIFICANT CHANGE UP (ref 3.5–5.2)
ALP SERPL-CCNC: 97 U/L — SIGNIFICANT CHANGE UP (ref 30–115)
ALT FLD-CCNC: 37 U/L — SIGNIFICANT CHANGE UP (ref 0–41)
ANION GAP SERPL CALC-SCNC: 10 MMOL/L — SIGNIFICANT CHANGE UP (ref 7–14)
APTT BLD: 30 SEC — SIGNIFICANT CHANGE UP (ref 27–39.2)
AST SERPL-CCNC: 25 U/L — SIGNIFICANT CHANGE UP (ref 0–41)
BASOPHILS # BLD AUTO: 0.06 K/UL — SIGNIFICANT CHANGE UP (ref 0–0.2)
BASOPHILS NFR BLD AUTO: 0.7 % — SIGNIFICANT CHANGE UP (ref 0–1)
BILIRUB SERPL-MCNC: 0.3 MG/DL — SIGNIFICANT CHANGE UP (ref 0.2–1.2)
BUN SERPL-MCNC: 7 MG/DL — LOW (ref 10–20)
CALCIUM SERPL-MCNC: 8.9 MG/DL — SIGNIFICANT CHANGE UP (ref 8.5–10.1)
CHLORIDE SERPL-SCNC: 105 MMOL/L — SIGNIFICANT CHANGE UP (ref 98–110)
CO2 SERPL-SCNC: 23 MMOL/L — SIGNIFICANT CHANGE UP (ref 17–32)
CREAT SERPL-MCNC: 0.5 MG/DL — LOW (ref 0.7–1.5)
EGFR: 117 ML/MIN/1.73M2 — SIGNIFICANT CHANGE UP
EOSINOPHIL # BLD AUTO: 0.12 K/UL — SIGNIFICANT CHANGE UP (ref 0–0.7)
EOSINOPHIL NFR BLD AUTO: 1.4 % — SIGNIFICANT CHANGE UP (ref 0–8)
ERYTHROCYTE [SEDIMENTATION RATE] IN BLOOD: 13 MM/HR — SIGNIFICANT CHANGE UP (ref 0–20)
GLUCOSE SERPL-MCNC: 88 MG/DL — SIGNIFICANT CHANGE UP (ref 70–99)
HCT VFR BLD CALC: 36.3 % — LOW (ref 37–47)
HGB BLD-MCNC: 12.3 G/DL — SIGNIFICANT CHANGE UP (ref 12–16)
IMM GRANULOCYTES NFR BLD AUTO: 0.3 % — SIGNIFICANT CHANGE UP (ref 0.1–0.3)
INR BLD: 0.94 RATIO — SIGNIFICANT CHANGE UP (ref 0.65–1.3)
LYMPHOCYTES # BLD AUTO: 2.58 K/UL — SIGNIFICANT CHANGE UP (ref 1.2–3.4)
LYMPHOCYTES # BLD AUTO: 29.9 % — SIGNIFICANT CHANGE UP (ref 20.5–51.1)
MCHC RBC-ENTMCNC: 30.7 PG — SIGNIFICANT CHANGE UP (ref 27–31)
MCHC RBC-ENTMCNC: 33.9 G/DL — SIGNIFICANT CHANGE UP (ref 32–37)
MCV RBC AUTO: 90.5 FL — SIGNIFICANT CHANGE UP (ref 81–99)
MONOCYTES # BLD AUTO: 0.54 K/UL — SIGNIFICANT CHANGE UP (ref 0.1–0.6)
MONOCYTES NFR BLD AUTO: 6.3 % — SIGNIFICANT CHANGE UP (ref 1.7–9.3)
NEUTROPHILS # BLD AUTO: 5.3 K/UL — SIGNIFICANT CHANGE UP (ref 1.4–6.5)
NEUTROPHILS NFR BLD AUTO: 61.4 % — SIGNIFICANT CHANGE UP (ref 42.2–75.2)
NRBC # BLD: 0 /100 WBCS — SIGNIFICANT CHANGE UP (ref 0–0)
PLATELET # BLD AUTO: 329 K/UL — SIGNIFICANT CHANGE UP (ref 130–400)
POTASSIUM SERPL-MCNC: 4 MMOL/L — SIGNIFICANT CHANGE UP (ref 3.5–5)
POTASSIUM SERPL-SCNC: 4 MMOL/L — SIGNIFICANT CHANGE UP (ref 3.5–5)
PROT SERPL-MCNC: 7 G/DL — SIGNIFICANT CHANGE UP (ref 6–8)
PROTHROM AB SERPL-ACNC: 10.8 SEC — SIGNIFICANT CHANGE UP (ref 9.95–12.87)
RBC # BLD: 4.01 M/UL — LOW (ref 4.2–5.4)
RBC # FLD: 12.2 % — SIGNIFICANT CHANGE UP (ref 11.5–14.5)
SODIUM SERPL-SCNC: 138 MMOL/L — SIGNIFICANT CHANGE UP (ref 135–146)
WBC # BLD: 8.63 K/UL — SIGNIFICANT CHANGE UP (ref 4.8–10.8)
WBC # FLD AUTO: 8.63 K/UL — SIGNIFICANT CHANGE UP (ref 4.8–10.8)

## 2022-05-23 PROCEDURE — 73562 X-RAY EXAM OF KNEE 3: CPT | Mod: 26,LT

## 2022-05-23 PROCEDURE — 93971 EXTREMITY STUDY: CPT | Mod: 26,LT

## 2022-05-23 PROCEDURE — 99285 EMERGENCY DEPT VISIT HI MDM: CPT

## 2022-05-23 RX ORDER — IBUPROFEN 200 MG
400 TABLET ORAL ONCE
Refills: 0 | Status: COMPLETED | OUTPATIENT
Start: 2022-05-23 | End: 2022-05-23

## 2022-05-23 RX ADMIN — Medication 400 MILLIGRAM(S): at 14:42

## 2022-05-23 NOTE — ED PROVIDER NOTE - NSFOLLOWUPCLINICS_GEN_ALL_ED_FT
[facility]  Medicine  [address], [place]   Phone: [phone]  Fax:   Follow Up Time: 1-3 Days

## 2022-05-23 NOTE — ED PROVIDER NOTE - NS ED ROS FT
Review of Systems:  	•	CONSTITUTIONAL - no fever, no diaphoresis  	•	SKIN - no rash, no lesions  	•	HEMATOLOGIC - no bleeding, no bruising  	•	EYES - no discharge, no injection  	•	ENT - no sore throat, no runny nose  	•	RESPIRATORY - no shortness of breath, no cough  	•	CARDIAC - no chest pain, no palpitations  	•	GI - no abd pain, no nausea, no vomiting, no diarrhea  	•	GENITO-URINARY - no dysuria, no hematuria  	•	MUSCULOSKELETAL - +leg pain, no muscle aches  	•	NEUROLOGIC - no dizziness, no headache

## 2022-05-23 NOTE — ED PROVIDER NOTE - PROGRESS NOTE DETAILS
AG: workup d/w pt and daughter at bedside. Knee wrapped in ace wrap, pt given crutches and demonstrated use. Will schedule for rapid PCP f/u as well as PT. Strict return precautions discussed.

## 2022-05-23 NOTE — ED PROVIDER NOTE - PATIENT PORTAL LINK FT
You can access the FollowMyHealth Patient Portal offered by [facility] by registering at the following website: [email or web address]. By joining [employer]’s FollowMyHealth portal, you will also be able to view your health information using other applications (apps) compatible with our system.

## 2022-05-23 NOTE — ED PROVIDER NOTE - NSPTACCESSSVCSAPPTDETAILS_ED_ALL_ED_FT
Pt is Cuban-speaking [phone] does not have PCP.  Please also attempt to schedule for physical therapy.

## 2022-05-23 NOTE — ED PROVIDER NOTE - CLINICAL SUMMARY MEDICAL DECISION MAKING FREE TEXT BOX
46-year-old female no stated past medical history complaining to 3 days of left knee pain, nontraumatic.  X-rays with no acute abnormality.  Knee placed in Ace wrap and patient given crutches.  Patient given strict return instructions.

## 2022-05-23 NOTE — ED PROVIDER NOTE - PHYSICAL EXAMINATION
Vital Signs: Reviewed  GEN: alert, NAD, speaks full sentences  HEAD:  normocephalic, atraumatic  EYES:  PERRLA; conjunctivae without injection, drainage or discharge  ENMT:  nasal mucosa moist; mouth moist without ulcerations or lesions; throat moist without erythema, exudate, ulcerations or lesions  NECK:  supple  CARDIAC:  regular rate, normal S1 and S2, no murmurs  RESP:  respiratory rate and effort appear normal for age; lungs are clear to auscultation bilaterally; no rales or wheezes  ABDOMEN:  soft, nontender, nondistended  MUSCULOSKELETAL/NEURO: antalgic gait, tenderness suprapatellar and distal thigh, FROM w/ pain, no erythema, sensation intact, DP 2+; otherwise normal movement, normal tone  SKIN:  normal skin color for age and race, well-perfused; warm and dry

## 2022-05-23 NOTE — ED PROVIDER NOTE - ATTENDING CONTRIBUTION TO CARE
I personally evaluated the patient. I reviewed the Resident’s or Physician Assistant’s note (as assigned above), and agree with the findings and plan except as documented in my note.   46-year-old female no significant past medical history complaining of left knee pain x 2 to 3 days. Pain is dull and radiates to the thigh. Pain worsens with bending and weightbearing. No left hip or ankle pain. No trauma. Patient works as a cleaning lady. No fever, chills. No knee redness, or warmth. Vitals noted.  CONSTITUTIONAL: Well-appearing; well-nourished; in no apparent distress.   EXT: Normal ROM in all four extremities; non-tender to palpation; distal pulses are normal. No leg edema B/L. L knee- + Tenderness over medial joint line. + small effusion. Full range of motion flexion and extension. Negative anterior and posterior drawer. No pain with varus or valgus stress. Calf supple nontender. Left hip and ankle nontender, full range of motion. 2+ pedal pulses.  SKIN: Normal for age and race; warm; dry; good turgor.  NEURO: A & O x 4; CN 2-12 intact. Grossly unremarkable.

## 2022-05-23 NOTE — ED ADULT NURSE NOTE - NSIMPLEMENTINTERV_GEN_ALL_ED
Implemented All Fall Risk Interventions:  [place] to call system. Call bell, personal items and telephone within reach. Instruct patient to call for assistance. Room bathroom lighting operational. Non-slip footwear when patient is off stretcher. Physically safe environment: no spills, clutter or unnecessary equipment. Stretcher in lowest position, wheels locked, appropriate side rails in place. Provide visual cue, wrist band, yellow gown, etc. Monitor gait and stability. Monitor for mental status changes and reorient to person, place, and time. Review medications for side effects contributing to fall risk. Reinforce activity limits and safety measures with patient and family.

## 2022-08-05 ENCOUNTER — APPOINTMENT (OUTPATIENT)
Dept: OBGYN | Facility: CLINIC | Age: 47
End: 2022-08-05

## 2022-08-05 ENCOUNTER — OUTPATIENT (OUTPATIENT)
Dept: OUTPATIENT SERVICES | Facility: HOSPITAL | Age: 47
LOS: 1 days | Discharge: HOME | End: 2022-08-05

## 2022-08-05 ENCOUNTER — RESULT REVIEW (OUTPATIENT)
Age: 47
End: 2022-08-05

## 2022-08-05 VITALS — DIASTOLIC BLOOD PRESSURE: 74 MMHG | WEIGHT: 151 LBS | SYSTOLIC BLOOD PRESSURE: 132 MMHG | BODY MASS INDEX: 30.5 KG/M2

## 2022-08-05 DIAGNOSIS — N91.5 OLIGOMENORRHEA, UNSPECIFIED: ICD-10-CM

## 2022-08-05 DIAGNOSIS — N97.9 FEMALE INFERTILITY, UNSPECIFIED: ICD-10-CM

## 2022-08-05 PROCEDURE — 99214 OFFICE O/P EST MOD 30 MIN: CPT | Mod: 25

## 2022-08-05 PROCEDURE — 99386 PREV VISIT NEW AGE 40-64: CPT

## 2022-08-06 PROBLEM — N97.9 FEMALE INFERTILITY: Status: ACTIVE | Noted: 2022-08-06

## 2022-08-06 NOTE — PHYSICAL EXAM
[Chaperone Present] : A chaperone was present in the examining room during all aspects of the physical examination [Appropriately responsive] : appropriately responsive [Alert] : alert [No Acute Distress] : no acute distress [Regular Rate Rhythm] : regular rate rhythm [No Murmurs] : no murmurs [Clear to Auscultation B/L] : clear to auscultation bilaterally [Soft] : soft [Non-tender] : non-tender [No Lesions] : no lesions [No Mass] : no mass [Examination Of The Breasts] : a normal appearance [No Masses] : no breast masses were palpable [Labia Majora] : normal [Labia Minora] : normal [Bartholin's Gland] : the right Bartholin's gland was normal [Normal] : normal [Uterine Adnexae] : normal [___] : no abscess [] : no inflammation

## 2022-08-06 NOTE — HISTORY OF PRESENT ILLNESS
[Irregular Menstrual Interval] : irregular menstrual interval [Light Bleeding] : light bleeding [Difficulty with [place]] : difficulty with intercourse [Insomnia] : insomnia [FreeTextEntry1] : 47 yo  with LMP 22 presents for annual visit. She has not been seen since 2018. She has been trying to get pregnant for the past 9 years without success. At her last visit she was recommended to get a semen analysis and use ovulation kits. She has not used either. She reports that she is still having her periods. They are not always regular and they are less, more like spotting. Additionally she complains of some vaginal dryness and pain during sex. She reports that when she last got pregnant 9 years ago they did a transvaginal US and found a "mass" that they removed and that allowed her to get pregnant. TVUS from 2018 significant for ovarian cysts. [Mammogramdate] : 2017 [PapSmeardate] : 2017

## 2022-08-06 NOTE — DISCUSSION/SUMMARY
[FreeTextEntry1] : 47 yo  with LMP 22 presents for annual visit.\par \par # desire for fertility\par - AMH/FSH\par - TVUS/pelvic US\par - TSH/T4\par \par # health maintenance\par - PAP with HPV\par - mammogram\par - referral to GI for colonoscopy

## 2022-08-08 DIAGNOSIS — Z01.419 ENCOUNTER FOR GYNECOLOGICAL EXAMINATION (GENERAL) (ROUTINE) WITHOUT ABNORMAL FINDINGS: ICD-10-CM

## 2022-08-08 DIAGNOSIS — N97.9 FEMALE INFERTILITY, UNSPECIFIED: ICD-10-CM

## 2022-08-12 LAB
HPV HIGH+LOW RISK DNA PNL CVX: NOT DETECTED
T4 FREE SERPL-MCNC: 1 NG/DL
TSH SERPL-ACNC: 4.31 UIU/ML

## 2022-08-17 LAB
CYTOLOGY CVX/VAG DOC THIN PREP: ABNORMAL
FSH SERPL-MCNC: 2.9 IU/L

## 2022-08-19 LAB — ANTI-MUELLERIAN HORMONE: 0.52 NG/ML

## 2022-09-02 ENCOUNTER — OUTPATIENT (OUTPATIENT)
Dept: OUTPATIENT SERVICES | Facility: HOSPITAL | Age: 47
LOS: 1 days | Discharge: HOME | End: 2022-09-02

## 2022-09-02 DIAGNOSIS — R10.2 PELVIC AND PERINEAL PAIN: ICD-10-CM

## 2022-09-02 PROCEDURE — 76830 TRANSVAGINAL US NON-OB: CPT | Mod: 26

## 2022-09-02 PROCEDURE — 76856 US EXAM PELVIC COMPLETE: CPT | Mod: 26

## 2022-09-03 ENCOUNTER — OUTPATIENT (OUTPATIENT)
Dept: OUTPATIENT SERVICES | Facility: HOSPITAL | Age: 47
LOS: 1 days | Discharge: HOME | End: 2022-09-03

## 2022-09-03 ENCOUNTER — RESULT REVIEW (OUTPATIENT)
Age: 47
End: 2022-09-03

## 2022-09-03 DIAGNOSIS — Z12.31 ENCOUNTER FOR SCREENING MAMMOGRAM FOR MALIGNANT NEOPLASM OF BREAST: ICD-10-CM

## 2022-09-03 PROCEDURE — 77067 SCR MAMMO BI INCL CAD: CPT | Mod: 26

## 2022-09-03 PROCEDURE — 77063 BREAST TOMOSYNTHESIS BI: CPT | Mod: 26

## 2022-09-07 ENCOUNTER — APPOINTMENT (OUTPATIENT)
Dept: OBGYN | Facility: CLINIC | Age: 47
End: 2022-09-07

## 2022-10-19 ENCOUNTER — NON-APPOINTMENT (OUTPATIENT)
Age: 47
End: 2022-10-19

## 2022-10-19 ENCOUNTER — OUTPATIENT (OUTPATIENT)
Dept: OUTPATIENT SERVICES | Facility: HOSPITAL | Age: 47
LOS: 1 days | Discharge: HOME | End: 2022-10-19

## 2022-10-19 ENCOUNTER — APPOINTMENT (OUTPATIENT)
Dept: GASTROENTEROLOGY | Facility: CLINIC | Age: 47
End: 2022-10-19

## 2022-10-19 VITALS
OXYGEN SATURATION: 99 % | TEMPERATURE: 97.7 F | HEIGHT: 59 IN | BODY MASS INDEX: 30.84 KG/M2 | SYSTOLIC BLOOD PRESSURE: 115 MMHG | HEART RATE: 75 BPM | WEIGHT: 153 LBS | DIASTOLIC BLOOD PRESSURE: 75 MMHG

## 2022-10-19 DIAGNOSIS — Z12.11 ENCOUNTER FOR SCREENING FOR MALIGNANT NEOPLASM OF COLON: ICD-10-CM

## 2022-10-19 DIAGNOSIS — Z78.9 OTHER SPECIFIED HEALTH STATUS: ICD-10-CM

## 2022-10-19 DIAGNOSIS — R10.9 UNSPECIFIED ABDOMINAL PAIN: ICD-10-CM

## 2022-10-19 DIAGNOSIS — Z87.898 PERSONAL HISTORY OF OTHER SPECIFIED CONDITIONS: ICD-10-CM

## 2022-10-19 PROCEDURE — 99204 OFFICE O/P NEW MOD 45 MIN: CPT | Mod: GC

## 2022-10-19 NOTE — PHYSICAL EXAM
[Alert] : alert [Normal Voice/Communication] : normal voice/communication [Sclera] : the sclera and conjunctiva were normal [Hearing Threshold Finger Rub Not [place]] : hearing was normal [Normal Lips/Gums] : the lips and gums were normal [Normal Appearance] : the appearance of the neck was normal [No Neck Mass] : no neck mass was observed [No Respiratory Distress] : no respiratory distress [No Acc Muscle Use] : no accessory muscle use [Respiration, Rhythm And Depth] : normal respiratory rhythm and effort [Auscultation Breath Sounds / Voice Sounds] : lungs were clear to auscultation bilaterally [Heart Rate And Rhythm] : heart rate was normal and rhythm regular [Normal S1, S2] : normal S1 and S2 [Murmurs] : no murmurs [Bowel Sounds] : normal bowel sounds [Abdomen Tenderness] : non-tender [No Masses] : no abdominal mass palpated [Abdomen Soft] : soft [No CVA Tenderness] : no CVA  tenderness [Abnormal Walk] : normal gait [Normal Color / Pigmentation] : normal skin color and pigmentation [Oriented To Time, Place, And Person] : oriented to person, place, and time

## 2022-10-21 NOTE — REVIEW OF SYSTEMS
[As Noted in HPI] : as noted in HPI [Fever] : no fever [Chills] : no chills [Eye Pain] : no eye pain [Sore Throat] : no sore throat [Chest Pain] : no chest pain [SOB on Exertion] : no shortness of breath during exertion [Constipation] : no constipation [Diarrhea] : no diarrhea [Bloating (gassiness)] : no bloating

## 2022-10-21 NOTE — ASSESSMENT
[FreeTextEntry1] : # screening colonoscopy (average risk)\par \par never had colonoscopy \par labs reviewed \par \par Plan \par schedule for colonoscopy \par prep ordered \par clear liquids the day before the procedure, NPO after midnight \par risk and benefits of colonoscopy discussed with patient and her daughter \par follow up in two weeks after the procedure \par \par # Intermittent left sided abdominal pain likely Musculoskeletal\par - abdominal US \par - colonoscopy scheduled \par - follow up after colonoscopy

## 2022-10-21 NOTE — END OF VISIT
[] : Fellow [FreeTextEntry3] : Pt presents to discuss screening colonoscopy, reports mild left sided discomfort with movement/lifting otherwise feeling well with no GI complaints. Possibly musculoskeletal component, no pain currently. Recommend abd ultrasound and schedule colonoscopy. Risks/benefits discussed.

## 2022-10-21 NOTE — HISTORY OF PRESENT ILLNESS
[FreeTextEntry1] : 47 year old female here for screening colonoscopy. \par complains of left sided abdominal pain associated with heavy lifting, worsening with straining, not related to food or bowel movements \par no fH of GI cancers, weight loss, dysphagia, melena, or hematochezia. never had colonoscopy before

## 2022-11-25 ENCOUNTER — LABORATORY RESULT (OUTPATIENT)
Age: 47
End: 2022-11-25

## 2022-11-25 ENCOUNTER — OUTPATIENT (OUTPATIENT)
Dept: OUTPATIENT SERVICES | Facility: HOSPITAL | Age: 47
LOS: 1 days | Discharge: HOME | End: 2022-11-25

## 2022-11-25 DIAGNOSIS — R10.9 UNSPECIFIED ABDOMINAL PAIN: ICD-10-CM

## 2022-11-25 PROCEDURE — 76700 US EXAM ABDOM COMPLETE: CPT | Mod: 26

## 2022-11-29 ENCOUNTER — OUTPATIENT (OUTPATIENT)
Dept: OUTPATIENT SERVICES | Facility: HOSPITAL | Age: 47
LOS: 1 days | Discharge: HOME | End: 2022-11-29
Payer: SUBSIDIZED

## 2022-11-29 ENCOUNTER — TRANSCRIPTION ENCOUNTER (OUTPATIENT)
Age: 47
End: 2022-11-29

## 2022-11-29 VITALS
SYSTOLIC BLOOD PRESSURE: 72 MMHG | HEART RATE: 72 BPM | TEMPERATURE: 98 F | HEIGHT: 55 IN | RESPIRATION RATE: 18 BRPM | WEIGHT: 149.91 LBS | DIASTOLIC BLOOD PRESSURE: 18 MMHG

## 2022-11-29 VITALS
SYSTOLIC BLOOD PRESSURE: 133 MMHG | HEART RATE: 64 BPM | RESPIRATION RATE: 15 BRPM | DIASTOLIC BLOOD PRESSURE: 78 MMHG | OXYGEN SATURATION: 100 %

## 2022-11-29 DIAGNOSIS — Z90.49 ACQUIRED ABSENCE OF OTHER SPECIFIED PARTS OF DIGESTIVE TRACT: Chronic | ICD-10-CM

## 2022-11-29 PROCEDURE — 45378 DIAGNOSTIC COLONOSCOPY: CPT

## 2022-11-29 NOTE — ASU DISCHARGE PLAN (ADULT/PEDIATRIC) - NS MD DC FALL RISK RISK
For information on Fall & Injury Prevention, visit: [email or web address] OR  [email or web address] OR  https://www.cdc.gov/steadi/patient.html

## 2022-11-29 NOTE — H&P PST ADULT - HISTORY OF PRESENT ILLNESS
47 year old female here for screening colonoscopy.    no fH of GI cancers   never had colonoscopy before

## 2022-11-29 NOTE — ASU PATIENT PROFILE, ADULT - FALL HARM RISK - UNIVERSAL INTERVENTIONS
Bed in lowest position, wheels locked, appropriate side rails in place/Call bell, personal items and telephone in reach/Instruct patient to call for assistance before getting out of bed or chair/Non-slip footwear when patient is out of bed/[place] to call system/Physically safe environment - no spills, clutter or unnecessary equipment/Purposeful Proactive Rounding/Room/bathroom lighting operational, light cord in reach

## 2022-11-29 NOTE — ASU DISCHARGE PLAN (ADULT/PEDIATRIC) - ASU DC SPECIAL INSTRUCTIONSFT
-Follow up with our [facility] located at [address] [place] [address]. Phone Number: [phone]

## 2022-12-01 DIAGNOSIS — Z90.49 ACQUIRED ABSENCE OF OTHER SPECIFIED PARTS OF DIGESTIVE TRACT: ICD-10-CM

## 2022-12-01 DIAGNOSIS — Z12.11 ENCOUNTER FOR SCREENING FOR MALIGNANT NEOPLASM OF COLON: ICD-10-CM

## 2022-12-01 DIAGNOSIS — Z87.440 PERSONAL HISTORY OF URINARY (TRACT) INFECTIONS: ICD-10-CM

## 2023-01-20 NOTE — ED PROVIDER NOTE - OBJECTIVE STATEMENT
PACU ANESTHESIA ADMISSION NOTE      Procedure: Hysteroscopy, using Symphion bipolar tissue removal system, with dilation and curettage of uterus      Post op diagnosis: Endometrial thickening       ____  Intubated  TV:______       Rate: ______      FiO2: ______    _x___  Patent Airway    _x___  Full return of protective reflexes    _x___  Full recovery from anesthesia / back to baseline status    Vitals:  T(F): 98.7  HR: 69  BP: 135/67  RR: 16  SpO2: 96%    Mental Status:  _x___ Awake   __x___ Alert   _____ Drowsy   _____ Sedated    Nausea/Vomiting:  _x___  NO       ______Yes,   See Post - Op Orders         Pain Scale (0-10):  __0___    Treatment: _x___ None    ____ See Post - Op/PCA Orders    Post - Operative Fluids:   __x__ Oral   ____ See Post - Op Orders    Plan: Discharge:   _x___Home       _____Floor     _____Critical Care    _____  Other:_________________    Comments:  No anesthesia issues or complications noted.  Discharge when criteria met.
46yF no stated pmhx c/o LT knee/leg pain starting yesterday; constant, worsening, states it is getting difficult to walk; no reports of trauma but pt is a  and on her feet/knees often. Pt otherwise in her usual state of health--denies fever/chills, chest pain, SOB, abd pain, n/v/d.  : Dorothy [id number]

## 2023-02-10 ENCOUNTER — EMERGENCY (EMERGENCY)
Facility: HOSPITAL | Age: 48
LOS: 0 days | Discharge: HOME | End: 2023-02-10
Attending: EMERGENCY MEDICINE
Payer: SELF-PAY

## 2023-02-10 VITALS
TEMPERATURE: 99 F | DIASTOLIC BLOOD PRESSURE: 60 MMHG | RESPIRATION RATE: 20 BRPM | SYSTOLIC BLOOD PRESSURE: 118 MMHG | HEART RATE: 89 BPM | OXYGEN SATURATION: 99 %

## 2023-02-10 DIAGNOSIS — Z90.49 ACQUIRED ABSENCE OF OTHER SPECIFIED PARTS OF DIGESTIVE TRACT: Chronic | ICD-10-CM

## 2023-02-10 DIAGNOSIS — M72.2 PLANTAR FASCIAL FIBROMATOSIS: ICD-10-CM

## 2023-02-10 DIAGNOSIS — M79.672 PAIN IN LEFT FOOT: ICD-10-CM

## 2023-02-10 LAB — GLUCOSE BLDC GLUCOMTR-MCNC: 116 MG/DL — HIGH (ref 70–99)

## 2023-02-10 PROCEDURE — 99284 EMERGENCY DEPT VISIT MOD MDM: CPT

## 2023-02-10 PROCEDURE — 99282 EMERGENCY DEPT VISIT SF MDM: CPT

## 2023-02-10 PROCEDURE — 82962 GLUCOSE BLOOD TEST: CPT

## 2023-02-10 NOTE — ED PROVIDER NOTE - OBJECTIVE STATEMENT
47 y.o. female, no PMH, comes in c/o 2mo h/o intermittent heel burning/pain. Worse in the morning. No trauma. No bruises. No fever/chills. No swelling. No other complains. Pain gets worse as day progresses.

## 2023-02-10 NOTE — ED PROVIDER NOTE - NSFOLLOWUPINSTRUCTIONS_ED_ALL_ED_FT
Starting Dose Override: 3000 mj/cm2
Protocol: Photochemotherapy: Mineral Oil and NBUVB
Detail Level: Zone
Dose: 220 mj/cm2
consent obtained.  The risks were reviewed with the patient including but not limited to: burn, pigmentary changes, pain, blistering, scabbing, redness, increased risk of skin cancers, and the remote possibility of scarring. Patient understand they are to follow-up every 3 months to continue treatment.
Frequency: BIW
Additional Comments: For missed treatments (according to time since last dose):\\n- up to 7 days: hold the previous dose constant\\n- 8-14 days: decrease the previous dose by 25%\\n- 15-28 days: decrease the previous dose by 50%\\n- > 28 days: return to starting dose\\n\\nOrder expires 5/21/20
Patient to be discharged from ED. Any available test results were discussed with patient and/or family. Verbal instructions given, including instructions to return to ED immediately for any new, worsening, or concerning symptoms. Patient endorsed understanding. Written discharge instructions additionally given, including follow-up plan.    Follow up with podiatry.

## 2023-02-10 NOTE — ED PROVIDER NOTE - PATIENT PORTAL LINK FT
You can access the FollowMyHealth Patient Portal offered by [facility] by registering at the following website: [email or web address]. By joining [email or web address]’s FollowMyHealth portal, you will also be able to view your health information using other applications (apps) compatible with our system.

## 2023-02-10 NOTE — ED PROVIDER NOTE - NSFOLLOWUPCLINICS_GEN_ALL_ED_FT
[facility]  Podiatry  .  [place]   Phone: [phone]  Fax:

## 2023-02-10 NOTE — ED PROVIDER NOTE - PHYSICAL EXAMINATION
pt in NAD, AAOx3, head NC/AT, CN II-XII intact, PEERL, EOMi, neck (-) midline tenderness, lungs CTA B/L, CV S1S2 regular, abdomen soft/NT/ND/(+)BS, ext (-) edema/bruising/swelling to left heel/foot, (-) skin changes, good cap refill, (-) TTP over fascia at this time, FROM of toes/ankle, motor 5/5x4, sensation intact, ambulating with steady gait

## 2023-02-23 ENCOUNTER — OUTPATIENT (OUTPATIENT)
Dept: OUTPATIENT SERVICES | Facility: HOSPITAL | Age: 48
LOS: 1 days | End: 2023-02-23
Payer: COMMERCIAL

## 2023-02-23 ENCOUNTER — APPOINTMENT (OUTPATIENT)
Dept: PODIATRY | Facility: CLINIC | Age: 48
End: 2023-02-23
Payer: COMMERCIAL

## 2023-02-23 DIAGNOSIS — Z90.49 ACQUIRED ABSENCE OF OTHER SPECIFIED PARTS OF DIGESTIVE TRACT: Chronic | ICD-10-CM

## 2023-02-23 DIAGNOSIS — Z00.00 ENCOUNTER FOR GENERAL ADULT MEDICAL EXAMINATION WITHOUT ABNORMAL FINDINGS: ICD-10-CM

## 2023-02-23 PROCEDURE — 99213 OFFICE O/P EST LOW 20 MIN: CPT | Mod: GC,25

## 2023-02-23 PROCEDURE — 99203 OFFICE O/P NEW LOW 30 MIN: CPT | Mod: GC,25

## 2023-02-23 PROCEDURE — 20550 NJX 1 TENDON SHEATH/LIGAMENT: CPT | Mod: GC,LT

## 2023-02-23 PROCEDURE — 20550 NJX 1 TENDON SHEATH/LIGAMENT: CPT | Mod: LT

## 2023-02-23 PROCEDURE — 99203 OFFICE O/P NEW LOW 30 MIN: CPT | Mod: 25

## 2023-02-27 NOTE — PROCEDURE
[Plantar Fascia Injection] : ~M plantar fascia (heel) injection [Left Foot] : on the left foot [Therapeutic] : therapeutic [Patient] : the patient [Risks] : risks [Ethyl Chloride] : ethyl chloride [___ ml Inj] : [unfilled] ~Uml [1%] : 1%  [Without Epi] : without epinephrine [Alcohol] : alcohol [25 gauge] : A 25 gauge needle was used [Betamethasone] : Betamethasone [Tolerated Well] : tolerated the procedure well [No Complications] : There were no complications. [de-identified] : 1cc

## 2023-02-27 NOTE — ASSESSMENT
[FreeTextEntry1] : Plantar fasciitis \par \par See procedure note; Injection given; patient tolerated procedure well\par NSAIDs for pain\par R.I.C.E until symptoms improve;\par Demonstrated stretching exercises to help improve pain\par Xray; B/l foot; R/o heel spur\par \par RTC 4 weeks

## 2023-02-27 NOTE — HISTORY OF PRESENT ILLNESS
[Sneakers] : alice [FreeTextEntry1] : 47 year old female presents with left plantar fascial pain with first step of the morning and pain improves with ambulation. Denies trauma/injury

## 2023-02-27 NOTE — END OF VISIT
[] : Resident [Resident] : Resident [FreeTextEntry3] : presents with heel pain left >right\par referred for xrays\par discussed streching, icing and OTC nsaids\par return 4 weeks [FreeTextEntry2] : left heel injection given\par My notes supersedes the above resident documentation in case of discrepancy. Correction for their notes is in my notes. \par \par

## 2023-02-28 DIAGNOSIS — M79.672 PAIN IN LEFT FOOT: ICD-10-CM

## 2023-02-28 DIAGNOSIS — M72.2 PLANTAR FASCIAL FIBROMATOSIS: ICD-10-CM

## 2023-03-23 ENCOUNTER — OUTPATIENT (OUTPATIENT)
Dept: OUTPATIENT SERVICES | Facility: HOSPITAL | Age: 48
LOS: 1 days | End: 2023-03-23
Payer: COMMERCIAL

## 2023-03-23 ENCOUNTER — APPOINTMENT (OUTPATIENT)
Dept: PODIATRY | Facility: CLINIC | Age: 48
End: 2023-03-23
Payer: COMMERCIAL

## 2023-03-23 ENCOUNTER — RESULT REVIEW (OUTPATIENT)
Age: 48
End: 2023-03-23

## 2023-03-23 DIAGNOSIS — Z00.00 ENCOUNTER FOR GENERAL ADULT MEDICAL EXAMINATION WITHOUT ABNORMAL FINDINGS: ICD-10-CM

## 2023-03-23 DIAGNOSIS — Z90.49 ACQUIRED ABSENCE OF OTHER SPECIFIED PARTS OF DIGESTIVE TRACT: Chronic | ICD-10-CM

## 2023-03-23 DIAGNOSIS — M79.671 PAIN IN RIGHT FOOT: ICD-10-CM

## 2023-03-23 PROCEDURE — 20550 NJX 1 TENDON SHEATH/LIGAMENT: CPT | Mod: LT

## 2023-03-23 PROCEDURE — 73630 X-RAY EXAM OF FOOT: CPT | Mod: 26,50

## 2023-03-23 PROCEDURE — 73630 X-RAY EXAM OF FOOT: CPT | Mod: 50

## 2023-03-24 DIAGNOSIS — M79.672 PAIN IN LEFT FOOT: ICD-10-CM

## 2023-03-24 DIAGNOSIS — M72.2 PLANTAR FASCIAL FIBROMATOSIS: ICD-10-CM

## 2023-03-24 DIAGNOSIS — M79.671 PAIN IN RIGHT FOOT: ICD-10-CM

## 2023-03-29 ENCOUNTER — OUTPATIENT (OUTPATIENT)
Dept: OUTPATIENT SERVICES | Facility: HOSPITAL | Age: 48
LOS: 1 days | End: 2023-03-29
Payer: COMMERCIAL

## 2023-03-29 ENCOUNTER — APPOINTMENT (OUTPATIENT)
Dept: PODIATRY | Facility: CLINIC | Age: 48
End: 2023-03-29
Payer: COMMERCIAL

## 2023-03-29 DIAGNOSIS — Z90.49 ACQUIRED ABSENCE OF OTHER SPECIFIED PARTS OF DIGESTIVE TRACT: Chronic | ICD-10-CM

## 2023-03-29 DIAGNOSIS — Z00.00 ENCOUNTER FOR GENERAL ADULT MEDICAL EXAMINATION WITHOUT ABNORMAL FINDINGS: ICD-10-CM

## 2023-03-29 PROCEDURE — 20550 NJX 1 TENDON SHEATH/LIGAMENT: CPT

## 2023-03-29 PROCEDURE — 20550 NJX 1 TENDON SHEATH/LIGAMENT: CPT | Mod: LT

## 2023-03-29 NOTE — PROCEDURE
[Plantar Fascia Injection] : ~M plantar fascia (heel) injection [Left Foot] : on the left foot [Therapeutic] : therapeutic [Patient] : the patient [Risks] : risks [Ethyl Chloride] : ethyl chloride [___ ml Inj] : [unfilled] ~Uml [1%] : 1%  [Without Epi] : without epinephrine [Alcohol] : alcohol [25 gauge] : A 25 gauge needle was used [Betamethasone] : Betamethasone [Tolerated Well] : tolerated the procedure well [No Complications] : There were no complications. [de-identified] : 1cc

## 2023-03-29 NOTE — PROCEDURE
[Plantar Fascia Injection] : ~M plantar fascia (heel) injection [Left Foot] : on the left foot [Therapeutic] : therapeutic [Patient] : the patient [Risks] : risks [Ethyl Chloride] : ethyl chloride [___ ml Inj] : [unfilled] ~Uml [1%] : 1%  [Without Epi] : without epinephrine [Alcohol] : alcohol [25 gauge] : A 25 gauge needle was used [Kenalog 10] : Kenalog 10 [Tolerated Well] : tolerated the procedure well [No Complications] : There were no complications. [de-identified] : 1cc

## 2023-03-29 NOTE — HISTORY OF PRESENT ILLNESS
[Sneakers] : alice [FreeTextEntry1] : 47 year old female presents with left plantar fascial pain with first step of the morning and pain improves with ambulation. Denies trauma/injury\par \par 3/23 returns with recurrent left heel pain

## 2023-03-29 NOTE — END OF VISIT
[] : Resident [Resident] : Resident [FreeTextEntry3] : \par return 4 weeks [FreeTextEntry2] :  \par \par

## 2023-03-29 NOTE — PHYSICAL EXAM
[2+] : left foot dorsalis pedis 2+ [Pes Planus] : pes planus deformity [Skin Color & Pigmentation] : normal skin color and pigmentation [Skin Turgor] : normal skin turgor [Skin Lesions] : no skin lesions [Ankle Swelling (On Exam)] : not present [Varicose Veins Of Lower Extremities] : not present [] : not present [Delayed in the Right Toes] : capillary refills normal in right toes [Delayed in the Left Toes] : capillary refills normal in the left toes [de-identified] : Tender to palpation of the medial calcaneal tubercle left foot.\par \par  [Foot Ulcer] : no foot ulcer [Skin Induration] : no skin induration [Diminished Throughout Right Foot] : normal sensation with monofilament testing throughout right foot [Diminished Throughout Left Foot] : normal sensation with monofilament testing throughout left foot

## 2023-03-29 NOTE — PHYSICAL EXAM
[2+] : left foot dorsalis pedis 2+ [Pes Planus] : pes planus deformity [Skin Color & Pigmentation] : normal skin color and pigmentation [Skin Turgor] : normal skin turgor [Skin Lesions] : no skin lesions [Ankle Swelling (On Exam)] : not present [Varicose Veins Of Lower Extremities] : not present [] : not present [Delayed in the Right Toes] : capillary refills normal in right toes [Delayed in the Left Toes] : capillary refills normal in the left toes [de-identified] : Tender to palpation of the medial calcaneal tubercle left foot.\par \par  [Foot Ulcer] : no foot ulcer [Skin Induration] : no skin induration [Diminished Throughout Right Foot] : normal sensation with monofilament testing throughout right foot [Diminished Throughout Left Foot] : normal sensation with monofilament testing throughout left foot

## 2023-03-29 NOTE — HISTORY OF PRESENT ILLNESS
[Sneakers] : alice [FreeTextEntry1] : 47 year old female presents with left plantar fascial pain with first step of the morning and pain improves with ambulation. Denies trauma/injury\par \par 3/23 returns with recurrent left heel pain\par \par 3/29 returns for follow up for left heel pain. notes 80% improvement from last visit

## 2023-03-30 DIAGNOSIS — M79.672 PAIN IN LEFT FOOT: ICD-10-CM

## 2023-03-30 DIAGNOSIS — M72.2 PLANTAR FASCIAL FIBROMATOSIS: ICD-10-CM

## 2023-05-10 ENCOUNTER — APPOINTMENT (OUTPATIENT)
Dept: PODIATRY | Facility: CLINIC | Age: 48
End: 2023-05-10
Payer: COMMERCIAL

## 2023-05-10 ENCOUNTER — OUTPATIENT (OUTPATIENT)
Dept: OUTPATIENT SERVICES | Facility: HOSPITAL | Age: 48
LOS: 1 days | End: 2023-05-10
Payer: COMMERCIAL

## 2023-05-10 DIAGNOSIS — Z90.49 ACQUIRED ABSENCE OF OTHER SPECIFIED PARTS OF DIGESTIVE TRACT: Chronic | ICD-10-CM

## 2023-05-10 DIAGNOSIS — Z00.00 ENCOUNTER FOR GENERAL ADULT MEDICAL EXAMINATION WITHOUT ABNORMAL FINDINGS: ICD-10-CM

## 2023-05-10 PROCEDURE — 99213 OFFICE O/P EST LOW 20 MIN: CPT

## 2023-05-10 PROCEDURE — 99213 OFFICE O/P EST LOW 20 MIN: CPT | Mod: GC

## 2023-05-12 NOTE — ASSESSMENT
[FreeTextEntry1] : Plantar fasciitis \par Full biomechanical exam done on pt including ambulation gait study\par Pt refuses injection\par Rx PT for L foot plantar fasciitis exercise\par Rx medrol dose chris\par F/u in 2 weeks

## 2023-05-12 NOTE — PHYSICAL EXAM
[2+] : left foot dorsalis pedis 2+ [Pes Planus] : pes planus deformity [Skin Color & Pigmentation] : normal skin color and pigmentation [Skin Turgor] : normal skin turgor [Skin Lesions] : no skin lesions [Ankle Swelling (On Exam)] : not present [Varicose Veins Of Lower Extremities] : not present [] : not present [Delayed in the Right Toes] : capillary refills normal in right toes [Delayed in the Left Toes] : capillary refills normal in the left toes [de-identified] : Tender to palpation of the medial calcaneal tubercle left foot.\par \par  [Foot Ulcer] : no foot ulcer [Skin Induration] : no skin induration [Diminished Throughout Right Foot] : normal sensation with monofilament testing throughout right foot [Diminished Throughout Left Foot] : normal sensation with monofilament testing throughout left foot

## 2023-05-12 NOTE — END OF VISIT
[] : Resident [FreeTextEntry3] : left heel injection with moderate improvement with steroid injections\par Rx medrol\par briefly discussed surgery\par return 2 weeks\par \par My notes supersedes the above resident documentation in case of discrepancy. Correction for their notes is in my notes. \par \par

## 2023-05-12 NOTE — HISTORY OF PRESENT ILLNESS
[Sneakers] : alice [FreeTextEntry1] : 47 year old female presents with left plantar fascial pain with first step of the morning and pain improves with ambulation. Denies trauma/injury\par \par 3/23 returns with recurrent left heel pain\par \par 3/29 returns for follow up for left heel pain. notes 80% improvement from last visit \par \par 5/10/23 returns w/ L heel pain, has not improved much since last injection; pt does not want another injection;

## 2023-05-17 DIAGNOSIS — M79.672 PAIN IN LEFT FOOT: ICD-10-CM

## 2023-05-17 DIAGNOSIS — M72.2 PLANTAR FASCIAL FIBROMATOSIS: ICD-10-CM

## 2023-05-26 ENCOUNTER — OUTPATIENT (OUTPATIENT)
Dept: OUTPATIENT SERVICES | Facility: HOSPITAL | Age: 48
LOS: 1 days | End: 2023-05-26
Payer: COMMERCIAL

## 2023-05-26 ENCOUNTER — APPOINTMENT (OUTPATIENT)
Dept: PODIATRY | Facility: CLINIC | Age: 48
End: 2023-05-26
Payer: COMMERCIAL

## 2023-05-26 DIAGNOSIS — Z00.00 ENCOUNTER FOR GENERAL ADULT MEDICAL EXAMINATION WITHOUT ABNORMAL FINDINGS: ICD-10-CM

## 2023-05-26 DIAGNOSIS — Z90.49 ACQUIRED ABSENCE OF OTHER SPECIFIED PARTS OF DIGESTIVE TRACT: Chronic | ICD-10-CM

## 2023-05-26 DIAGNOSIS — M77.52 OTHER ENTHESOPATHY OF LT FOOT AND ANKLE: ICD-10-CM

## 2023-05-26 DIAGNOSIS — M79.672 PAIN IN LEFT FOOT: ICD-10-CM

## 2023-05-26 DIAGNOSIS — G89.29 PAIN IN LEFT FOOT: ICD-10-CM

## 2023-05-26 PROCEDURE — 99213 OFFICE O/P EST LOW 20 MIN: CPT

## 2023-05-30 DIAGNOSIS — M77.52 OTHER ENTHESOPATHY OF LEFT FOOT AND ANKLE: ICD-10-CM

## 2023-05-30 DIAGNOSIS — M72.2 PLANTAR FASCIAL FIBROMATOSIS: ICD-10-CM

## 2023-05-30 DIAGNOSIS — M79.672 PAIN IN LEFT FOOT: ICD-10-CM

## 2023-05-30 NOTE — PHYSICAL EXAM
[2+] : left foot dorsalis pedis 2+ [Pes Planus] : pes planus deformity [Skin Color & Pigmentation] : normal skin color and pigmentation [Skin Turgor] : normal skin turgor [Skin Lesions] : no skin lesions [Ankle Swelling (On Exam)] : not present [Varicose Veins Of Lower Extremities] : not present [] : not present [Delayed in the Right Toes] : capillary refills normal in right toes [Delayed in the Left Toes] : capillary refills normal in the left toes [de-identified] : Tender to palpation of the lateral band of the plantar fascia and posterior calcaneus, left foot\par \par  [Foot Ulcer] : no foot ulcer [Skin Induration] : no skin induration [Diminished Throughout Right Foot] : normal sensation with monofilament testing throughout right foot [Diminished Throughout Left Foot] : normal sensation with monofilament testing throughout left foot

## 2023-05-30 NOTE — ASSESSMENT
[FreeTextEntry1] : Plantar fasciitis \par -rx medrol dose pack\par pt shown stretching exercises\par recommended OTC heel lifts  \par

## 2023-05-30 NOTE — HISTORY OF PRESENT ILLNESS
[Sneakers] : alice [FreeTextEntry1] : 47 year old female presents with left plantar fascial pain with first step of the morning and pain improves with ambulation. Denies trauma/injury\par \par 3/23 returns with recurrent left heel pain\par \par 3/29 returns for follow up for left heel pain. notes 80% improvement from last visit \par \par 5/10/23 returns w/ L heel pain, has not improved much since last injection; pt does not want another injection; \par \par 5/26 returns for management of left heel pain. transfer of heel pain to the lateral and posterior left heel

## 2023-06-19 NOTE — PHYSICAL EXAM
Patient is seen today for an In Person/In Office Visit    I have reviewed Active Medication List, Allergies, Vital Signs and Active Problem List.    Chief Complaint   Patient presents with   • Follow-up     6 month check     Dementia    Subjective:    Here for Follow up of dementia    Dementia memory continues to slowly worsen.  Wife is considering looking for in-home help.      He has mild major depression which remains stable.      He is atrial fibrillation with stable heart rate control this time.      He has dyslipidemia which is stable.    He voices no other additional concerns at this time.     Current Outpatient Medications   Medication Sig Dispense Refill   • tamsulosin (FLOMAX) 0.4 MG Cap Take 1 capsule by mouth daily after a meal. 90 capsule 3   • sertraline (ZOLOFT) 50 MG tablet Take 1 tablet by mouth daily. 90 tablet 1   • vitamin B-12 (CYANOCOBALAMIN) 50 MCG tablet Take 50 mcg by mouth daily.     • memantine (NAMENDA) 10 MG tablet Take 1 tablet by mouth daily. 90 tablet 3   • finasteride (PROSCAR) 5 MG tablet TAKE 1 TABLET BY MOUTH DAILY 90 tablet 3   • alendronate (FOSAMAX) 70 MG tablet Take 1 tablet by mouth every 7 days. 12 tablet 3   • Ascorbic Acid (VITAMIN C) 1000 MG tablet Take 1,000 mg by mouth daily.     • Calcium Carbonate-Vitamin D (CALCIUM-CARB 600 + D PO) Take 1 tablet by mouth daily.     • Cholecalciferol (VITAMIN D3) 2000 units capsule Take 2,000 Units by mouth daily.     • Multiple Vitamins-Minerals (MULTIVITAMIN ADULT PO) Take 1 tablet by mouth daily.     • Omega 3 1000 MG capsule Take 2,000 mg by mouth daily.     • Psyllium (METAMUCIL PO) Take 1 packet by mouth daily.     • warfarin (COUMADIN) 5 MG tablet Take 5 mg by mouth.       No current facility-administered medications for this visit.           Objective:    Visit Vitals  /68   Pulse 60   Temp 97.9 °F (36.6 °C) (Temporal)   Resp 16   Wt 85.7 kg (189 lb)   BMI 27.12 kg/m²       General: Well Developed, Well Nourished.  Nontoxic in appearance  HEENT:Conjunctiva are pink, Oropharynx is moist and without exudates  Neck: No carotid bruits are noted bilaterally  Cv: Regular Heart Rate and Rhythm. Normal S1 and S2.    No S3 or S4 heard.   No gallops, rubs or murmurs  Lungs:  Respiratory effort is normal. No rales, rhonchi, or Wheezing is noted.  No accessory muscle use is noted.  Musculoskeletal/extremities:no edema noted to the bilateral lower extremities  Neurologic: Awake, Alert and Oriented to Person, Place, Time and Situation  Skin: Warm, Dry and Intact. No cyanosis or pallor.  No clubbing.  No rashes.  Psychiatric: Normal Mood and Affect. Thought content normal.     Recent PHQ 2/9 Score    PHQ 2:  Date Adult PHQ 2 Score Adult PHQ 2 Interpretation   12/12/2022 0 No further screening needed       PHQ 9:         Assessment:    Labs:    Lab Services on 06/15/2023   Component Date Value Ref Range Status   • Fasting Status 06/15/2023 12  0 - 999 Hours Final   • Sodium 06/15/2023 140  135 - 145 mmol/L Final   • Potassium 06/15/2023 4.7  3.4 - 5.1 mmol/L Final   • Chloride 06/15/2023 105  97 - 110 mmol/L Final   • Carbon Dioxide 06/15/2023 29  21 - 32 mmol/L Final   • Anion Gap 06/15/2023 11  7 - 19 mmol/L Final   • Glucose 06/15/2023 89  70 - 99 mg/dL Final   • BUN 06/15/2023 25 (H)  6 - 20 mg/dL Final   • Creatinine 06/15/2023 1.25 (H)  0.67 - 1.17 mg/dL Final   • Glomerular Filtration Rate 06/15/2023 54 (L)  >=60 Final    eGFR 30-59 mL/min/1.73m2 = Moderate decrease in kidney function. Stage 3 CKD (chronic kidney disease) or moderate kidney disease. Estimated GFR calculated using the CKD-EPI-R (2021) equation that does not include race in the creatinine calculation.   • BUN/Cr 06/15/2023 20  7 - 25 Final   • Calcium 06/15/2023 9.3  8.4 - 10.2 mg/dL Final   • Bilirubin, Total 06/15/2023 0.7  0.2 - 1.0 mg/dL Final   • GOT/AST 06/15/2023 18  <=37 Units/L Final   • GPT/ALT 06/15/2023 21  <64 Units/L Final   • Alkaline Phosphatase  06/15/2023 67  45 - 117 Units/L Final   • Albumin 06/15/2023 3.6  3.6 - 5.1 g/dL Final   • Protein, Total 06/15/2023 6.9  6.4 - 8.2 g/dL Final   • Globulin 06/15/2023 3.3  2.0 - 4.0 g/dL Final   • A/G Ratio 06/15/2023 1.1  1.0 - 2.4 Final   • Cholesterol 06/15/2023 143  <=199 mg/dL Final      Desirable         <200  Borderline High   200 to 239  High              >=240   • Triglycerides 06/15/2023 77  <=149 mg/dL Final      Normal            <150  Borderline High   150 to 199  High              200 to 499  Very High         >=500   • HDL 06/15/2023 41  >=40 mg/dL Final      Low              <40  Borderline Low   40 to 49  Near Optimal     50 to 59  Optimal          >=60   • LDL 06/15/2023 87  <=129 mg/dL Final      Optimal           <100  Near Optimal      100 to 129  Borderline High   130 to 159  High              160 to 189  Very High         >=190   • Non-HDL Cholesterol 06/15/2023 102  mg/dL Final      Therapeutic Target:  CHD and risk equivalents  <130  Multiple risk factors     <160  0 to 1 risk factor        <190   • Cholesterol/ HDL Ratio 06/15/2023 3.5  <=4.4 Final   • WBC 06/15/2023 4.7  4.2 - 11.0 K/mcL Final   • RBC 06/15/2023 4.00 (L)  4.50 - 5.90 mil/mcL Final   • HGB 06/15/2023 13.0  13.0 - 17.0 g/dL Final   • HCT 06/15/2023 38.5 (L)  39.0 - 51.0 % Final   • MCV 06/15/2023 96.3  78.0 - 100.0 fl Final   • MCH 06/15/2023 32.5  26.0 - 34.0 pg Final   • MCHC 06/15/2023 33.8  32.0 - 36.5 g/dL Final   • PLT 06/15/2023 100 (L)  140 - 450 K/mcL Final   • RDW-CV 06/15/2023 12.6  11.0 - 15.0 % Final   • RDW-SD 06/15/2023 44.8  39.0 - 50.0 fL Final   • [facility] 06/15/2023 0  <=0 /100 WBC Final       Diagnoses pertaining to today's visit/meds/labs:    Senile dementia with depression without behavioral disturbance (CMD)  (primary encounter diagnosis)  Mild major depression (CMD)  Peripheral vascular disease (CMD)  Atrial fibrillation, unspecified type (CMD)  Thrombocytopenia (CMD)  Stage 3 chronic kidney disease,  unspecified whether stage 3a or 3b CKD (CMD)  Age-related osteoporosis without current pathological fracture-fosamax started 1/16/19  Dyslipidemia     Orders Placed This Encounter   • CBC No Differential   • Comprehensive Metabolic Panel     Order Specific Question:   Should this test be performed fasting or random?     Answer:   Fasting   • Lipid Panel With Reflex     Order Specific Question:   Should this test be performed fasting or random?     Answer:   Fasting   • Thyroid Stimulating Hormone   • Vitamin D -25 Hydroxy   • tamsulosin (FLOMAX) 0.4 MG Cap     Sig: Take 1 capsule by mouth daily after a meal.     Dispense:  90 capsule     Refill:  3     **Patient requests 90 days supply**       __________________________________________      SEE FURTHER DISCUSSION BELOW       Plan:    1. Significant dementia-likely Alzheimer's type.  Continue current regimen.  I would agree with investigating assisted living or in-home care   2. Mild major depression-stable.    3. Atrial fibrillation-stable heart rate control this time   4. Dyslipidemia-stable.  Continue dietary efforts  5. Osteoporosis-continue alendronate.  Recent bone densitometry was stable.  He did recently have a fall.  It is the only polys had last year.  Gait was observed and appeared unremarkable.  He does have a cane this current encouraged to use this   6. Stage 3 chronic kidney disease-remains stable   7. History of DVT-anticoagulated on Coumadin  8. Peripheral vascular disease-he is not complaining of any claudication at this time   9. Thrombocytopenia-remains stable    10. Thoracic aortic aneurysm-being followed by Cardiology.  Stable.      The return here in six months with labs prior for a wellness visit   [2+] : left foot dorsalis pedis 2+ [Pes Planus] : pes planus deformity [Normal Foot/Ankle] : Both lower extremities were exposed and visualized. Standing exam demonstrates normal foot posture and alignment. Hindfoot exam shows no hindfoot valgus or varus [Skin Color & Pigmentation] : normal skin color and pigmentation [Skin Turgor] : normal skin turgor [Skin Lesions] : no skin lesions [Ankle Swelling (On Exam)] : not present [Varicose Veins Of Lower Extremities] : not present [] : not present [Delayed in the Right Toes] : capillary refills normal in right toes [Delayed in the Left Toes] : capillary refills normal in the left toes [Foot Ulcer] : no foot ulcer [Skin Induration] : no skin induration [Diminished Throughout Right Foot] : normal sensation with monofilament testing throughout right foot [Diminished Throughout Left Foot] : normal sensation with monofilament testing throughout left foot

## 2023-07-22 ENCOUNTER — EMERGENCY (EMERGENCY)
Facility: HOSPITAL | Age: 48
LOS: 0 days | Discharge: HOME | End: 2023-07-22
Attending: EMERGENCY MEDICINE
Payer: MEDICAID

## 2023-07-22 VITALS
RESPIRATION RATE: 16 BRPM | SYSTOLIC BLOOD PRESSURE: 172 MMHG | WEIGHT: 139.99 LBS | OXYGEN SATURATION: 99 % | DIASTOLIC BLOOD PRESSURE: 86 MMHG | HEART RATE: 105 BPM | TEMPERATURE: 99 F

## 2023-07-22 VITALS — TEMPERATURE: 100 F

## 2023-07-22 DIAGNOSIS — M79.18 MYALGIA, OTHER SITE: ICD-10-CM

## 2023-07-22 DIAGNOSIS — Z87.440 PERSONAL HISTORY OF URINARY (TRACT) INFECTIONS: ICD-10-CM

## 2023-07-22 DIAGNOSIS — R11.0 NAUSEA: ICD-10-CM

## 2023-07-22 DIAGNOSIS — Z90.49 ACQUIRED ABSENCE OF OTHER SPECIFIED PARTS OF DIGESTIVE TRACT: ICD-10-CM

## 2023-07-22 DIAGNOSIS — Z20.822 CONTACT WITH AND (SUSPECTED) EXPOSURE TO COVID-19: ICD-10-CM

## 2023-07-22 DIAGNOSIS — B34.9 VIRAL INFECTION, UNSPECIFIED: ICD-10-CM

## 2023-07-22 DIAGNOSIS — R51.9 HEADACHE, UNSPECIFIED: ICD-10-CM

## 2023-07-22 DIAGNOSIS — Z90.49 ACQUIRED ABSENCE OF OTHER SPECIFIED PARTS OF DIGESTIVE TRACT: Chronic | ICD-10-CM

## 2023-07-22 LAB
ALBUMIN SERPL ELPH-MCNC: 3.7 G/DL — SIGNIFICANT CHANGE UP (ref 3.5–5.2)
ALP SERPL-CCNC: 169 U/L — HIGH (ref 30–115)
ALT FLD-CCNC: 187 U/L — HIGH (ref 0–41)
ANION GAP SERPL CALC-SCNC: 10 MMOL/L — SIGNIFICANT CHANGE UP (ref 7–14)
APPEARANCE UR: CLEAR — SIGNIFICANT CHANGE UP
AST SERPL-CCNC: 197 U/L — HIGH (ref 0–41)
BASOPHILS # BLD AUTO: 0.03 K/UL — SIGNIFICANT CHANGE UP (ref 0–0.2)
BASOPHILS NFR BLD AUTO: 0.7 % — SIGNIFICANT CHANGE UP (ref 0–1)
BILIRUB SERPL-MCNC: 0.4 MG/DL — SIGNIFICANT CHANGE UP (ref 0.2–1.2)
BILIRUB UR-MCNC: NEGATIVE — SIGNIFICANT CHANGE UP
BUN SERPL-MCNC: 5 MG/DL — LOW (ref 10–20)
CALCIUM SERPL-MCNC: 8.7 MG/DL — SIGNIFICANT CHANGE UP (ref 8.4–10.5)
CHLORIDE SERPL-SCNC: 100 MMOL/L — SIGNIFICANT CHANGE UP (ref 98–110)
CO2 SERPL-SCNC: 24 MMOL/L — SIGNIFICANT CHANGE UP (ref 17–32)
COLOR SPEC: YELLOW — SIGNIFICANT CHANGE UP
CREAT SERPL-MCNC: 0.5 MG/DL — LOW (ref 0.7–1.5)
DIFF PNL FLD: NEGATIVE — SIGNIFICANT CHANGE UP
EGFR: 116 ML/MIN/1.73M2 — SIGNIFICANT CHANGE UP
EOSINOPHIL # BLD AUTO: 0 K/UL — SIGNIFICANT CHANGE UP (ref 0–0.7)
EOSINOPHIL NFR BLD AUTO: 0 % — SIGNIFICANT CHANGE UP (ref 0–8)
GLUCOSE SERPL-MCNC: 102 MG/DL — HIGH (ref 70–99)
GLUCOSE UR QL: NEGATIVE — SIGNIFICANT CHANGE UP
HCG SERPL QL: NEGATIVE — SIGNIFICANT CHANGE UP
HCT VFR BLD CALC: 41.1 % — SIGNIFICANT CHANGE UP (ref 37–47)
HGB BLD-MCNC: 13.9 G/DL — SIGNIFICANT CHANGE UP (ref 12–16)
IMM GRANULOCYTES NFR BLD AUTO: 0.7 % — HIGH (ref 0.1–0.3)
KETONES UR-MCNC: NEGATIVE — SIGNIFICANT CHANGE UP
LEUKOCYTE ESTERASE UR-ACNC: NEGATIVE — SIGNIFICANT CHANGE UP
LYMPHOCYTES # BLD AUTO: 0.73 K/UL — LOW (ref 1.2–3.4)
LYMPHOCYTES # BLD AUTO: 17.8 % — LOW (ref 20.5–51.1)
MCHC RBC-ENTMCNC: 30.5 PG — SIGNIFICANT CHANGE UP (ref 27–31)
MCHC RBC-ENTMCNC: 33.8 G/DL — SIGNIFICANT CHANGE UP (ref 32–37)
MCV RBC AUTO: 90.3 FL — SIGNIFICANT CHANGE UP (ref 81–99)
MONOCYTES # BLD AUTO: 0.15 K/UL — SIGNIFICANT CHANGE UP (ref 0.1–0.6)
MONOCYTES NFR BLD AUTO: 3.7 % — SIGNIFICANT CHANGE UP (ref 1.7–9.3)
NEUTROPHILS # BLD AUTO: 3.16 K/UL — SIGNIFICANT CHANGE UP (ref 1.4–6.5)
NEUTROPHILS NFR BLD AUTO: 77.1 % — HIGH (ref 42.2–75.2)
NITRITE UR-MCNC: NEGATIVE — SIGNIFICANT CHANGE UP
NRBC # BLD: 0 /100 WBCS — SIGNIFICANT CHANGE UP (ref 0–0)
PH UR: 8.5 — HIGH (ref 5–8)
PLATELET # BLD AUTO: 257 K/UL — SIGNIFICANT CHANGE UP (ref 130–400)
PMV BLD: 9.7 FL — SIGNIFICANT CHANGE UP (ref 7.4–10.4)
POTASSIUM SERPL-MCNC: 4.1 MMOL/L — SIGNIFICANT CHANGE UP (ref 3.5–5)
POTASSIUM SERPL-SCNC: 4.1 MMOL/L — SIGNIFICANT CHANGE UP (ref 3.5–5)
PROT SERPL-MCNC: 6.4 G/DL — SIGNIFICANT CHANGE UP (ref 6–8)
PROT UR-MCNC: SIGNIFICANT CHANGE UP
RBC # BLD: 4.55 M/UL — SIGNIFICANT CHANGE UP (ref 4.2–5.4)
RBC # FLD: 15.8 % — HIGH (ref 11.5–14.5)
SODIUM SERPL-SCNC: 134 MMOL/L — LOW (ref 135–146)
SP GR SPEC: 1.02 — SIGNIFICANT CHANGE UP (ref 1.01–1.03)
UROBILINOGEN FLD QL: SIGNIFICANT CHANGE UP
WBC # BLD: 4.1 K/UL — LOW (ref 4.8–10.8)
WBC # FLD AUTO: 4.1 K/UL — LOW (ref 4.8–10.8)

## 2023-07-22 PROCEDURE — 99284 EMERGENCY DEPT VISIT MOD MDM: CPT

## 2023-07-22 PROCEDURE — 99284 EMERGENCY DEPT VISIT MOD MDM: CPT | Mod: 25

## 2023-07-22 PROCEDURE — 85025 COMPLETE CBC W/AUTO DIFF WBC: CPT

## 2023-07-22 PROCEDURE — 80053 COMPREHEN METABOLIC PANEL: CPT

## 2023-07-22 PROCEDURE — 96374 THER/PROPH/DIAG INJ IV PUSH: CPT

## 2023-07-22 PROCEDURE — 70450 CT HEAD/BRAIN W/O DYE: CPT | Mod: MA

## 2023-07-22 PROCEDURE — 71046 X-RAY EXAM CHEST 2 VIEWS: CPT | Mod: 26

## 2023-07-22 PROCEDURE — 36415 COLL VENOUS BLD VENIPUNCTURE: CPT

## 2023-07-22 PROCEDURE — 84703 CHORIONIC GONADOTROPIN ASSAY: CPT

## 2023-07-22 PROCEDURE — 81003 URINALYSIS AUTO W/O SCOPE: CPT

## 2023-07-22 PROCEDURE — 70450 CT HEAD/BRAIN W/O DYE: CPT | Mod: 26,MA

## 2023-07-22 PROCEDURE — 71046 X-RAY EXAM CHEST 2 VIEWS: CPT

## 2023-07-22 PROCEDURE — 87086 URINE CULTURE/COLONY COUNT: CPT

## 2023-07-22 PROCEDURE — 96375 TX/PRO/DX INJ NEW DRUG ADDON: CPT

## 2023-07-22 PROCEDURE — 0241U: CPT

## 2023-07-22 RX ORDER — SODIUM CHLORIDE 9 MG/ML
1000 INJECTION INTRAMUSCULAR; INTRAVENOUS; SUBCUTANEOUS ONCE
Refills: 0 | Status: COMPLETED | OUTPATIENT
Start: 2023-07-22 | End: 2023-07-22

## 2023-07-22 RX ORDER — ACETAMINOPHEN 500 MG
975 TABLET ORAL ONCE
Refills: 0 | Status: COMPLETED | OUTPATIENT
Start: 2023-07-22 | End: 2023-07-22

## 2023-07-22 RX ORDER — METOCLOPRAMIDE HCL 10 MG
10 TABLET ORAL ONCE
Refills: 0 | Status: COMPLETED | OUTPATIENT
Start: 2023-07-22 | End: 2023-07-22

## 2023-07-22 RX ORDER — KETOROLAC TROMETHAMINE 30 MG/ML
15 SYRINGE (ML) INJECTION ONCE
Refills: 0 | Status: DISCONTINUED | OUTPATIENT
Start: 2023-07-22 | End: 2023-07-22

## 2023-07-22 RX ADMIN — SODIUM CHLORIDE 1000 MILLILITER(S): 9 INJECTION INTRAMUSCULAR; INTRAVENOUS; SUBCUTANEOUS at 18:14

## 2023-07-22 RX ADMIN — SODIUM CHLORIDE 1000 MILLILITER(S): 9 INJECTION INTRAMUSCULAR; INTRAVENOUS; SUBCUTANEOUS at 16:47

## 2023-07-22 RX ADMIN — Medication 975 MILLIGRAM(S): at 16:48

## 2023-07-22 RX ADMIN — Medication 975 MILLIGRAM(S): at 18:00

## 2023-07-22 RX ADMIN — Medication 15 MILLIGRAM(S): at 18:14

## 2023-07-22 RX ADMIN — Medication 10 MILLIGRAM(S): at 16:47

## 2023-07-22 NOTE — ED PROVIDER NOTE - PHYSICAL EXAMINATION
Physical Exam    Vital Signs: I have reviewed the initial vital signs.  Constitutional: appears stated age, no acute distress  Eyes: Conjunctiva pink, Sclera clear, PERRLA, EOMI.  Cardiovascular: S1 and S2, regular rate, regular rhythm, well-perfused extremities, radial pulses equal and 2+, pedal pulses 2+ and equal  Respiratory: unlabored respiratory effort, clear to auscultation bilaterally no wheezing, rales, or rhonchi  Gastrointestinal:  abdomen soft, non-tender  Musculoskeletal: supple neck, no lower extremity edema, no midline tenderness  Integumentary: warm, dry, no rash  Neurologic: awake, alert, oriented x3, extremities’ motor and sensory functions grossly intact, cranial nerves II-XII grossly intact

## 2023-07-22 NOTE — ED PROVIDER NOTE - CLINICAL SUMMARY MEDICAL DECISION MAKING FREE TEXT BOX
not applicable (Male)
Pt with headache and myalgias after being the sun, labs and imaging reassuring  similar ha in the past.  improved with symptomatic tx.  dc to f/u with pmd outpt.  Any ordered labs and EKG were reviewed.  Any imaging was ordered and reviewed by me.  Appropriate medications for patient's presenting complaints were ordered and effects were reassessed.  Patient's records (prior hospital, ED visit, and/or nursing home notes if available) were reviewed.  Additional history was obtained from EMS, family, and/or PCP (where available).  Escalation to admission/observation was considered.  1) However patient feels much better and is comfortable with discharge.  Appropriate follow-up was arranged

## 2023-07-22 NOTE — ED ADULT TRIAGE NOTE - CHIEF COMPLAINT QUOTE
pt c/o headache and nausea since last night. pt does not frequently get headaches. pt stated its the worst headache she has ever had. NIHss 0

## 2023-07-22 NOTE — ED PROVIDER NOTE - NSFOLLOWUPINSTRUCTIONS_ED_ALL_ED_FT
Seguimiento con baxter médico de atención primaria en 1-3 días.    Cefalea tensional en los adultos  Tension Headache, Adult  La cefalea tensional es helen sensación de dolor o presión en la frente y los lados de la michelle. El dolor puede ser sordo o puede sentirse lashon helen tensión. Hay dos tipos de cefaleas tensionales:  Cefalea tensional episódica. Es cuando las cefaleas se producen menos de 15 días por mes.  Cefalea tensional crónica. Es cuando las cefaleas se producen más de 15 días por mes en un período de 3 meses.  Las cefaleas tensionales pueden durar de 30 minutos a varios días. Constituyen el tipo más frecuente de dolor de michelle. Generalmente, no se asocian con náuseas o vómitos y no empeoran con la actividad física.    ¿Cuáles son las causas?  Se desconoce la causa exacta de esta afección. Las cefaleas tensionales generalmente aparecen después de helen situación de estrés, ansiedad o por depresión. Otros factores desencadenantes pueden incluir los siguientes:  Alcohol.  Exceso de cafeína o abstinencia de cafeína.  Infecciones respiratorias, lashon resfriados, gripes o sinusitis.  Problemas dentales o apretar los dientes.  Fatiga.  Mantener la michelle y el chetan en la misma posición pedro un período prolongado, por ejemplo, al usar la computadora.  Fumar.  Artritis del chetan.  ¿Cuáles son los signos o los síntomas?  Los síntomas de esta afección incluyen:  Sensación de presión o tensión alrededor de la michelle.  Dolor sordo en la michelle.  Dolor sobre la frente y los lados de la michelle.  Dolor a la palpación en los músculos de la michelle, del chetan y de los hombros.  ¿Cómo se diagnostica?  Esta afección se puede diagnosticar en función de los síntomas, la historia clínica y los antecedentes médicos.    Si los síntomas son agudos o inusuales, es posible que le kyrie estudios de diagnóstico por imágenes, lashon helen exploración por tomografía computarizada (TC) o helen resonancia magnética (RM) de la michelle. También le pueden revisar la vista.    ¿Cómo se trata?  Esta afección puede tratarse con cambios en el estilo de eloy y medicamentos que ayudan a aliviar los síntomas.    Siga estas instrucciones en baxter casa:  Control del dolor    Use los medicamentos de venta lily y los recetados solamente lashon se lo haya indicado el médico.  Cuando tenga helen cefalea, acuéstese en helen habitación oscura y silenciosa.  Si se lo indican, aplíquese hielo en la michelle y en el chetan. Para hacer esto:  Ponga el hielo en helen bolsa plástica.  Coloque helen toalla entre la piel y la bolsa.  Aplique el hielo pedro 20 minutos, 2 o 3 veces por día.  Retire el hielo si la piel se pone de color osman brillante. Short es muy importante. Si no puede sentir dolor, calor o frío, tiene un mayor riesgo de que se dañe la jose.  Si se lo indican, aplique calor en la jose posterior del chetan con la frecuencia que le haya indicado el médico. Use la yanna de calor que el médico le recomiende, lashon helen compresa de calor húmedo o helen almohadilla térmica.  Coloque helen toalla entre la piel y la yanna de calor.  Aplique calor pedro 20 a 30 minutos.  Retire la yanna de calor si la piel se pone de color osman brillante. Short es especialmente importante si no puede sentir dolor, calor o frío. Corre un mayor riesgo de sufrir quemaduras.  Comida y bebida    Mantenga un horario para las comidas.  Si tena alcohol:  Limite la cantidad que tena a lo siguiente:  De 0 a 1 medida por día para las mujeres que no están embarazadas.  De 0 a 2 medidas por día para los hombres.  Sepa cuánta cantidad de alcohol hay en las bebidas que laura. En los [place], helen medida equivale a helen botella de cerveza de 12 oz (355 ml), un vaso de vino de 5 oz (148 ml) o un vaso de helen bebida alcohólica de ct graduación de 1½ oz (44 ml).  Irma suficiente líquido lashon para mantener la orina de color amarillo pálido.  Disminuya el consumo de cafeína o deje de consumir cafeína.  Estilo de eloy    Duerma entre 7 y 9 horas o la cantidad de horas que le haya recomendado el médico.  A la hora de acostarse, retire las computadoras, los teléfonos y las tabletas del dormitorio.  Busque maneras de manejar el estrés. Short puede incluir:  Realizar actividad física.  Practicar ejercicios de respiración profunda.  Yoga.  Escuchar música.  Visualización mental positiva.  Trate de sentarse derecho y evite tensionar los músculos.  No consuma ningún producto que contenga nicotina o tabaco. Estos incluyen cigarrillos, tabaco para mascar y aparatos de vapeo, lashon los cigarrillos electrónicos. Si necesita ayuda para dejar de fumar, consulte al médico.  Instrucciones generales      Evite cualquier desencadenante del dolor de michelle. Lleve un registro diario para averiguar qué puede desencadenar las cefaleas. Registre, por ejemplo, lo siguiente:  Lo que usted come y tena.  El tiempo que duerme.  Algún cambio en baxter dieta o en los medicamentos.  Cumpla con todas las visitas de seguimiento. Short es importante.  Comuníquese con un médico si:  La cefalea no se manoj.  La cefalea regresa.  Tiene sensibilidad a los sonidos, la arthur o los olores debido a la cefalea.  Tiene náuseas o vómitos.  Le duele el estómago.  Solicite ayuda de inmediato si:  Tiene helen cefalea muy intensa y repentina junto con alguno de los siguientes síntomas:  Rigidez en el chetan.  Náuseas y vómitos.  Confusión.  Debilidad en helen parte o un lado del cuerpo.  Visión doble o pérdida de la visión.  Falta de aire.  Erupción cutánea.  Somnolencia inusual.  Fiebre o escalofríos.  Dificultad para hablar.  Dolor en el yoli o el oído.  Dificultad para caminar o mantener el equilibrio.  Mareo o desmayo.  Resumen  La cefalea tensional es helen sensación de dolor o presión en la frente y los lados de la michelle.  Las cefaleas tensionales pueden durar de 30 minutos a varios días. Constituyen el tipo más frecuente de dolor de michelle.  Esta afección se puede diagnosticar en función de los síntomas, la historia clínica y los antecedentes médicos.    Enfermedades virales en los adultos  Viral Illness, Adult  Los virus son microbios diminutos que entran en el organismo de helen persona y causan enfermedades. Hay muchos tipos de virus diferentes y causan muchas clases de enfermedades. Las enfermedades virales pueden ser leves o graves. Pueden afectar diferentes partes del cuerpo.    Entre las afecciones a corto plazo causadas por virus, se incluyen los resfríos y la gripe. Entre las afecciones a minor plazo causadas por un virus, incluyen el herpes, la culebrilla y la infección por VIH (virus de inmunodeficiencia humana). Se uribe identificado unos pocos virus asociados con determinados tipos de cáncer.    ¿Cuáles son las causas?  Muchos tipos de virus pueden causar enfermedades. Los virus invaden las células del organismo, se multiplican y provocan que las células infectadas funcionen de manera anormal o mueran. Cuando estas células mueren, liberan más virus. Cuando esto ocurre, aparecen síntomas de la enfermedad, y el virus sigue diseminándose a otras células. Si el virus asume la función de la célula, puede hacer que esta se divida y prolifere de manera descontrolada. Short ocurre cuando un virus causa cáncer.    Los diferentes virus ingresan al organismo de distintas formas. Puede contraer un virus de la siguiente manera:  Al ingerir alimentos o beber agua que uribe entrado en contacto con el virus (están contaminados).  Al inhalar gotitas que helen persona infectada liberó en el aire al toser o estornudar.  Al tocar helen superficie contaminada con el virus y luego llevarse la mano a la boca, la nariz o los ojos.  Al ser zia por un insecto o mordido por un animal que son portadores del virus.  Al tener contacto sexual con helen persona infectada por el virus.  Al tener contacto con katia o líquidos que contienen el virus, ya sea a través de un zena abierto o pedro helen transfusión.  Si el virus ingresa al organismo, el sistema de defensa del cuerpo (sistema inmunitario) intentará combatirlo. Puede correr un riesgo más alto de tener helen enfermedad viral si tiene el sistema inmunitario debilitado.    ¿Cuáles son los signos o síntomas?  Puede tener los siguientes síntomas, dependiendo del tipo de virus y de la ubicación de las células que invade:  Virus del resfrío y de la gripe:  Fiebre.  Dolor de michelle.  Dolor de garganta.  Corinna musculares.  Nariz tapada (congestión nasal).  Tos.  Virus del aparato digestivo (gastrointestinales):  Fiebre.  Dolor en el abdomen.  Náuseas.  Diarrea.  Virus hepáticos (hepatitis):  Pérdida del apetito.  Cansancio.  La piel o las partes yaz de los ojos se ponen yudy (ictericia).  Virus del cerebro y la médula melissa:  Fiebre.  Dolor de michelle.  Rigidez en el chetan.  Náuseas y vómitos.  Confusión o somnolencia.  Virus de la piel:  Verrugas.  Picazón.  Erupción cutánea.  Virus de transmisión sexual:  Secreción.  Hinchazón.  Enrojecimiento.  Erupción cutánea.  ¿Cómo se diagnostica?  Esta afección se puede diagnosticar en función de lo siguiente:  Síntomas.  Antecedentes médicos.  Examen físico.  Análisis de katia, helen muestra de mucosidad de los pulmones (muestra de esputo), muestra de heces o un hisopado de líquidos corporales o helen llaga de la piel (lesión).  ¿Cómo se trata?  Los virus pueden ser difíciles de tratar porque se hospedan en las células. Los antibióticos no tratan los virus porque estos medicamentos no llegan al interior de las células. El tratamiento de helen enfermedad viral puede incluir lo siguiente:  Descansar y beber abundantes líquidos.  Medicamentos para aliviar los síntomas. Estos pueden incluir medicamentos de venta lily para el dolor y la fiebre, medicamentos para la tos o la congestión y medicamentos para aliviar la diarrea.  Medicamentos antivirales. Estos medicamentos están disponibles únicamente para ciertos tipos de virus.  Algunas enfermedades virales pueden evitarse con vacunas. Un ejemplo frecuente es la vacuna antigripal.    Siga estas instrucciones en baxter casa:  Medicamentos    Use los medicamentos de venta lily y los recetados solamente lashon se lo haya indicado el médico.  Si le recetaron un medicamento antiviral, tómelo lashon se lo haya indicado el médico. No deje de tracie el antiviral aunque comience a sentirse mejor.  Infórmese sobre cuándo los antibióticos son necesarios y cuándo no. Los antibióticos no combaten a los virus. Si tiene helen infección viral y el médico irving que también tiene helen infección bacteriana, o que está en riesgo de contraerla, lissette vez le recete un antibiótico.  No solicite helen receta para antibióticos si le uribe diagnosticado helen enfermedad viral. Los antibióticos no harán que se cure más rápidamente.  Tracie antibióticos con frecuencia cuando no son necesarios puede derivar en resistencia a los antibióticos. Cuando esto ocurre, el medicamento pierde baxter eficacia contra las bacterias que normalmente combate.  Indicaciones generales      Irma suficiente líquido para mantener la orina de color amarillo pálido.  Descanse todo lo que pueda.  Retome birgit actividades normales según lo indicado por el médico. Pregúntele al médico qué actividades son seguras para usted.  Concurra a todas las visitas de seguimiento lashon se lo haya indicado el médico. Short es importante.  ¿Cómo se previene?    Para reducir el riesgo de tener helen enfermedad viral:  Lávese las mulugeta frecuentemente con agua y jabón pedro al menos 20 segundos. Use desinfectante para mulugeta si no dispone de agua y jabón.  Evite tocarse la nariz, los ojos y la boca, sobre todo si no se lavó las mulugeta recientemente.  Si un miembro de la ivanna tiene helen infección viral, limpie todas las superficies de la casa que puedan radhika estado en contacto con el virus. Use [place] y jabón. También puede usar lejía con agua agregada (diluido).  Manténgase lejos de las personas enfermas con síntomas de helen infección viral.  No comparta objetos tales lashon cepillos de dientes y botellas de agua con otras personas.  Mantenga las vacunas al día. Short incluye recibir la vacuna antigripal todos los años.  Siga helen dieta saludable y descanse lo suficiente.  Comuníquese con un médico si:  Tiene síntomas de helen enfermedad viral que no desaparecen.  Los síntomas regresan después de radhika desaparecido.  Birgit síntomas empeoran.  Solicite ayuda de inmediato si tiene:  Dificultad para respirar.  Dolor de michelle intenso o rigidez en el chetan.  Vómitos abundantes o dolor en el abdomen.  Estos síntomas pueden representar un problema grave que constituye helen emergencia. No espere a robert si los síntomas desaparecen. Solicite atención médica de inmediato. Comuníquese con el servicio de emergencias de baxter localidad (911 en los [place]). No conduzca por birgit propios medios hasta el hospital.    Resumen  Los virus son tipos de microbios que entran en el organismo de helen persona y causan enfermedades. Las enfermedades virales pueden ser leves o graves. Pueden afectar diferentes partes del cuerpo.  Los virus pueden ser difíciles de tratar. Hay medicamentos para aliviar los síntomas y hay algunos medicamentos antivirales.  Si le recetaron un medicamento antiviral, tómelo lashon se lo haya indicado el médico. No deje de tracie el antiviral aunque comience a sentirse mejor.  Comuníquese con un médico si tiene síntomas de helen enfermedad viral que no desaparecen.

## 2023-07-22 NOTE — ED PROVIDER NOTE - PATIENT PORTAL LINK FT
You can access the FollowMyHealth Patient Portal offered by [facility] by registering at the following website: [email or web address]. By joining [facility]’s FollowMyHealth portal, you will also be able to view your health information using other applications (apps) compatible with our system.

## 2023-07-22 NOTE — ED PROVIDER NOTE - OBJECTIVE STATEMENT
47-year-old female denies past medical history presents with complaint of headache.  Patient states headache began last night at 8 PM after patient got home from working outside in the sun throughout the day.  Patient states headache is frontal, pounding, mildly relieved with 400 mg Motrin taken at 9 AM this morning.  Endorses nausea associated with headache as well as diffuse myalgias and malaise.  Denies fever/chills, rash, neck stiffness, numbness/tingling, vomiting, vision change, balance issues, photo/phonophobia, weakness, dizziness, lightheadedness, chest pain, cough, shortness of breath.

## 2023-07-22 NOTE — ED PROVIDER NOTE - PROGRESS NOTE DETAILS
AY: Despite triage note, patient reports headache is not worst of her life and that she has had similar headaches in the past. AY: Patient reporting mild improvement in headache, will give additional IV fluids, toradol, and reassess. AY: Patient reports resolution of her headache. The patient was given detailed return precautions and advised to return to the emergency department if any new symptoms developed, symptoms worsened or for any concerns. The patient was offered the opportunity to ask questions and verbalized that they understand the diagnosis and discharge instructions.

## 2023-07-22 NOTE — ED PROVIDER NOTE - ATTENDING APP SHARED VISIT CONTRIBUTION OF CARE
46 yo f with pmh uti, presents with c/o bodyaches, subj fever/chills, and headache.  +nausea, but no vomiting.  no neck pain or stiffness.  no abd pain, cp, sob, urinary sx.  denies sore throat, stuffy nose.  dec appetite.  pt works as a .  exam: nad, ncat, perrl, eomi, mmm, rrr, ctab, abd soft, nt, nd aox3, no calf tenderness, no pitting edema imp: pt with fever, chills, bodyaches, headache, likely viral syndrome, will check labs, ct head, ua, sympotmatic tx

## 2023-07-22 NOTE — ED ADULT NURSE NOTE - OBJECTIVE STATEMENT
Pt presents to the ED c/o headache and jaw pain x1 day. Pt states that the headache is so bad she is unable to eat or do anything.

## 2023-07-23 LAB
FLUAV AG NPH QL: SIGNIFICANT CHANGE UP
FLUBV AG NPH QL: SIGNIFICANT CHANGE UP
RSV RNA NPH QL NAA+NON-PROBE: SIGNIFICANT CHANGE UP
SARS-COV-2 RNA SPEC QL NAA+PROBE: SIGNIFICANT CHANGE UP

## 2023-07-24 LAB
CULTURE RESULTS: SIGNIFICANT CHANGE UP
SPECIMEN SOURCE: SIGNIFICANT CHANGE UP

## 2023-07-25 ENCOUNTER — INPATIENT (INPATIENT)
Facility: HOSPITAL | Age: 48
LOS: 6 days | Discharge: HOME | DRG: 720 | End: 2023-08-01
Attending: INTERNAL MEDICINE | Admitting: STUDENT IN AN ORGANIZED HEALTH CARE EDUCATION/TRAINING PROGRAM
Payer: MEDICAID

## 2023-07-25 VITALS
TEMPERATURE: 100 F | DIASTOLIC BLOOD PRESSURE: 78 MMHG | SYSTOLIC BLOOD PRESSURE: 140 MMHG | HEART RATE: 108 BPM | OXYGEN SATURATION: 98 % | WEIGHT: 149.91 LBS | RESPIRATION RATE: 20 BRPM

## 2023-07-25 DIAGNOSIS — Z90.49 ACQUIRED ABSENCE OF OTHER SPECIFIED PARTS OF DIGESTIVE TRACT: Chronic | ICD-10-CM

## 2023-07-25 PROCEDURE — 99285 EMERGENCY DEPT VISIT HI MDM: CPT

## 2023-07-25 NOTE — ED ADULT TRIAGE NOTE - CHIEF COMPLAINT QUOTE
Pt with c/o of fever and body aches/ patient was here Saturday for the same symptoms/ patient took 2 tabs of tylenol at 1/2 hr ago

## 2023-07-26 DIAGNOSIS — R74.01 ELEVATION OF LEVELS OF LIVER TRANSAMINASE LEVELS: ICD-10-CM

## 2023-07-26 LAB
ALBUMIN SERPL ELPH-MCNC: 3.1 G/DL — LOW (ref 3.5–5.2)
ALBUMIN SERPL ELPH-MCNC: 3.6 G/DL — SIGNIFICANT CHANGE UP (ref 3.5–5.2)
ALP SERPL-CCNC: 280 U/L — HIGH (ref 30–115)
ALP SERPL-CCNC: 348 U/L — HIGH (ref 30–115)
ALT FLD-CCNC: 366 U/L — HIGH (ref 0–41)
ALT FLD-CCNC: 425 U/L — HIGH (ref 0–41)
ANION GAP SERPL CALC-SCNC: 10 MMOL/L — SIGNIFICANT CHANGE UP (ref 7–14)
ANION GAP SERPL CALC-SCNC: 11 MMOL/L — SIGNIFICANT CHANGE UP (ref 7–14)
APAP SERPL-MCNC: 15 UG/ML — SIGNIFICANT CHANGE UP (ref 10–30)
AST SERPL-CCNC: 221 U/L — HIGH (ref 0–41)
AST SERPL-CCNC: 282 U/L — HIGH (ref 0–41)
BASOPHILS # BLD AUTO: 0.03 K/UL — SIGNIFICANT CHANGE UP (ref 0–0.2)
BASOPHILS NFR BLD AUTO: 0.9 % — SIGNIFICANT CHANGE UP (ref 0–1)
BILIRUB DIRECT SERPL-MCNC: 0.5 MG/DL — HIGH (ref 0–0.3)
BILIRUB INDIRECT FLD-MCNC: 0.3 MG/DL — SIGNIFICANT CHANGE UP (ref 0.2–1.2)
BILIRUB SERPL-MCNC: 0.7 MG/DL — SIGNIFICANT CHANGE UP (ref 0.2–1.2)
BILIRUB SERPL-MCNC: 0.8 MG/DL — SIGNIFICANT CHANGE UP (ref 0.2–1.2)
BUN SERPL-MCNC: 5 MG/DL — LOW (ref 10–20)
BUN SERPL-MCNC: 6 MG/DL — LOW (ref 10–20)
CALCIUM SERPL-MCNC: 8.3 MG/DL — LOW (ref 8.4–10.5)
CALCIUM SERPL-MCNC: 8.4 MG/DL — SIGNIFICANT CHANGE UP (ref 8.4–10.5)
CHLORIDE SERPL-SCNC: 105 MMOL/L — SIGNIFICANT CHANGE UP (ref 98–110)
CHLORIDE SERPL-SCNC: 99 MMOL/L — SIGNIFICANT CHANGE UP (ref 98–110)
CO2 SERPL-SCNC: 24 MMOL/L — SIGNIFICANT CHANGE UP (ref 17–32)
CO2 SERPL-SCNC: 24 MMOL/L — SIGNIFICANT CHANGE UP (ref 17–32)
CREAT SERPL-MCNC: 0.6 MG/DL — LOW (ref 0.7–1.5)
CREAT SERPL-MCNC: 0.7 MG/DL — SIGNIFICANT CHANGE UP (ref 0.7–1.5)
CRP SERPL-MCNC: 51.4 MG/L — HIGH
EGFR: 107 ML/MIN/1.73M2 — SIGNIFICANT CHANGE UP
EGFR: 111 ML/MIN/1.73M2 — SIGNIFICANT CHANGE UP
EOSINOPHIL # BLD AUTO: 0 K/UL — SIGNIFICANT CHANGE UP (ref 0–0.7)
EOSINOPHIL NFR BLD AUTO: 0 % — SIGNIFICANT CHANGE UP (ref 0–8)
ERYTHROCYTE [SEDIMENTATION RATE] IN BLOOD: 10 MM/HR — SIGNIFICANT CHANGE UP (ref 0–20)
GLUCOSE SERPL-MCNC: 101 MG/DL — HIGH (ref 70–99)
GLUCOSE SERPL-MCNC: 157 MG/DL — HIGH (ref 70–99)
HCT VFR BLD CALC: 39.1 % — SIGNIFICANT CHANGE UP (ref 37–47)
HETEROPH AB TITR SER AGGL: NEGATIVE — SIGNIFICANT CHANGE UP
HGB BLD-MCNC: 13.1 G/DL — SIGNIFICANT CHANGE UP (ref 12–16)
LIDOCAIN IGE QN: 38 U/L — SIGNIFICANT CHANGE UP (ref 7–60)
LYMPHOCYTES # BLD AUTO: 0.57 K/UL — LOW (ref 1.2–3.4)
LYMPHOCYTES # BLD AUTO: 17.7 % — LOW (ref 20.5–51.1)
MANUAL SMEAR VERIFICATION: SIGNIFICANT CHANGE UP
MCHC RBC-ENTMCNC: 30.2 PG — SIGNIFICANT CHANGE UP (ref 27–31)
MCHC RBC-ENTMCNC: 33.5 G/DL — SIGNIFICANT CHANGE UP (ref 32–37)
MCV RBC AUTO: 90.1 FL — SIGNIFICANT CHANGE UP (ref 81–99)
METAMYELOCYTES # FLD: 0.9 % — HIGH (ref 0–0)
MONOCYTES # BLD AUTO: 0.26 K/UL — SIGNIFICANT CHANGE UP (ref 0.1–0.6)
MONOCYTES NFR BLD AUTO: 8 % — SIGNIFICANT CHANGE UP (ref 1.7–9.3)
MYELOCYTES NFR BLD: 0.9 % — HIGH (ref 0–0)
NEUTROPHILS # BLD AUTO: 2.19 K/UL — SIGNIFICANT CHANGE UP (ref 1.4–6.5)
NEUTROPHILS NFR BLD AUTO: 65.5 % — SIGNIFICANT CHANGE UP (ref 42.2–75.2)
NEUTS BAND # BLD: 2.6 % — SIGNIFICANT CHANGE UP (ref 0–6)
PLAT MORPH BLD: ABNORMAL
PLATELET # BLD AUTO: 142 K/UL — SIGNIFICANT CHANGE UP (ref 130–400)
PMV BLD: 10.6 FL — HIGH (ref 7.4–10.4)
POLYCHROMASIA BLD QL SMEAR: SLIGHT — SIGNIFICANT CHANGE UP
POTASSIUM SERPL-MCNC: 4.2 MMOL/L — SIGNIFICANT CHANGE UP (ref 3.5–5)
POTASSIUM SERPL-MCNC: 5.4 MMOL/L — HIGH (ref 3.5–5)
POTASSIUM SERPL-SCNC: 4.2 MMOL/L — SIGNIFICANT CHANGE UP (ref 3.5–5)
POTASSIUM SERPL-SCNC: 5.4 MMOL/L — HIGH (ref 3.5–5)
PROT SERPL-MCNC: 5.7 G/DL — LOW (ref 6–8)
PROT SERPL-MCNC: 6.7 G/DL — SIGNIFICANT CHANGE UP (ref 6–8)
RBC # BLD: 4.34 M/UL — SIGNIFICANT CHANGE UP (ref 4.2–5.4)
RBC # FLD: 15.7 % — HIGH (ref 11.5–14.5)
RBC BLD AUTO: ABNORMAL
SMUDGE CELLS # BLD: PRESENT — SIGNIFICANT CHANGE UP
SODIUM SERPL-SCNC: 133 MMOL/L — LOW (ref 135–146)
SODIUM SERPL-SCNC: 140 MMOL/L — SIGNIFICANT CHANGE UP (ref 135–146)
VARIANT LYMPHS # BLD: 3.5 % — SIGNIFICANT CHANGE UP (ref 0–5)
WBC # BLD: 3.21 K/UL — LOW (ref 4.8–10.8)
WBC # FLD AUTO: 3.21 K/UL — LOW (ref 4.8–10.8)

## 2023-07-26 PROCEDURE — 81025 URINE PREGNANCY TEST: CPT

## 2023-07-26 PROCEDURE — 85730 THROMBOPLASTIN TIME PARTIAL: CPT

## 2023-07-26 PROCEDURE — 80048 BASIC METABOLIC PNL TOTAL CA: CPT

## 2023-07-26 PROCEDURE — 83615 LACTATE (LD) (LDH) ENZYME: CPT

## 2023-07-26 PROCEDURE — 84484 ASSAY OF TROPONIN QUANT: CPT

## 2023-07-26 PROCEDURE — 76705 ECHO EXAM OF ABDOMEN: CPT | Mod: 26

## 2023-07-26 PROCEDURE — 84156 ASSAY OF PROTEIN URINE: CPT

## 2023-07-26 PROCEDURE — 80076 HEPATIC FUNCTION PANEL: CPT

## 2023-07-26 PROCEDURE — 0225U NFCT DS DNA&RNA 21 SARSCOV2: CPT

## 2023-07-26 PROCEDURE — 86140 C-REACTIVE PROTEIN: CPT

## 2023-07-26 PROCEDURE — 84133 ASSAY OF URINE POTASSIUM: CPT

## 2023-07-26 PROCEDURE — 86705 HEP B CORE ANTIBODY IGM: CPT

## 2023-07-26 PROCEDURE — 83605 ASSAY OF LACTIC ACID: CPT

## 2023-07-26 PROCEDURE — 86696 HERPES SIMPLEX TYPE 2 TEST: CPT

## 2023-07-26 PROCEDURE — 84145 PROCALCITONIN (PCT): CPT

## 2023-07-26 PROCEDURE — 86709 HEPATITIS A IGM ANTIBODY: CPT

## 2023-07-26 PROCEDURE — 86788 WEST NILE VIRUS AB IGM: CPT

## 2023-07-26 PROCEDURE — 93010 ELECTROCARDIOGRAM REPORT: CPT

## 2023-07-26 PROCEDURE — 83010 ASSAY OF HAPTOGLOBIN QUANT: CPT

## 2023-07-26 PROCEDURE — 85610 PROTHROMBIN TIME: CPT

## 2023-07-26 PROCEDURE — 84540 ASSAY OF URINE/UREA-N: CPT

## 2023-07-26 PROCEDURE — 83935 ASSAY OF URINE OSMOLALITY: CPT

## 2023-07-26 PROCEDURE — 85045 AUTOMATED RETICULOCYTE COUNT: CPT

## 2023-07-26 PROCEDURE — 87340 HEPATITIS B SURFACE AG IA: CPT

## 2023-07-26 PROCEDURE — 87468 ANAPLSMA PHGCYTOPHLM AMP PRB: CPT

## 2023-07-26 PROCEDURE — 87798 DETECT AGENT NOS DNA AMP: CPT

## 2023-07-26 PROCEDURE — 86664 EPSTEIN-BARR NUCLEAR ANTIGEN: CPT

## 2023-07-26 PROCEDURE — 71045 X-RAY EXAM CHEST 1 VIEW: CPT | Mod: 26

## 2023-07-26 PROCEDURE — 83690 ASSAY OF LIPASE: CPT

## 2023-07-26 PROCEDURE — 86618 LYME DISEASE ANTIBODY: CPT

## 2023-07-26 PROCEDURE — 87040 BLOOD CULTURE FOR BACTERIA: CPT

## 2023-07-26 PROCEDURE — 87484 EHRLICHA CHAFFEENSIS AMP PRB: CPT

## 2023-07-26 PROCEDURE — 86789 WEST NILE VIRUS ANTIBODY: CPT

## 2023-07-26 PROCEDURE — 82570 ASSAY OF URINE CREATININE: CPT

## 2023-07-26 PROCEDURE — 86753 PROTOZOA ANTIBODY NOS: CPT

## 2023-07-26 PROCEDURE — 71045 X-RAY EXAM CHEST 1 VIEW: CPT

## 2023-07-26 PROCEDURE — 81001 URINALYSIS AUTO W/SCOPE: CPT

## 2023-07-26 PROCEDURE — 86666 EHRLICHIA ANTIBODY: CPT

## 2023-07-26 PROCEDURE — 86022 PLATELET ANTIBODIES: CPT

## 2023-07-26 PROCEDURE — 86663 EPSTEIN-BARR ANTIBODY: CPT

## 2023-07-26 PROCEDURE — 74178 CT ABD&PLV WO CNTR FLWD CNTR: CPT

## 2023-07-26 PROCEDURE — 99223 1ST HOSP IP/OBS HIGH 75: CPT

## 2023-07-26 PROCEDURE — 87389 HIV-1 AG W/HIV-1&-2 AB AG IA: CPT

## 2023-07-26 PROCEDURE — 85652 RBC SED RATE AUTOMATED: CPT

## 2023-07-26 PROCEDURE — 83735 ASSAY OF MAGNESIUM: CPT

## 2023-07-26 PROCEDURE — 86695 HERPES SIMPLEX TYPE 1 TEST: CPT

## 2023-07-26 PROCEDURE — 86720 LEPTOSPIRA ANTIBODY: CPT

## 2023-07-26 PROCEDURE — 86665 EPSTEIN-BARR CAPSID VCA: CPT

## 2023-07-26 PROCEDURE — 36415 COLL VENOUS BLD VENIPUNCTURE: CPT

## 2023-07-26 PROCEDURE — 85025 COMPLETE CBC W/AUTO DIFF WBC: CPT

## 2023-07-26 PROCEDURE — 84300 ASSAY OF URINE SODIUM: CPT

## 2023-07-26 PROCEDURE — 82977 ASSAY OF GGT: CPT

## 2023-07-26 PROCEDURE — 71260 CT THORAX DX C+: CPT

## 2023-07-26 PROCEDURE — 86706 HEP B SURFACE ANTIBODY: CPT

## 2023-07-26 PROCEDURE — 80053 COMPREHEN METABOLIC PANEL: CPT

## 2023-07-26 PROCEDURE — 87449 NOS EACH ORGANISM AG IA: CPT

## 2023-07-26 PROCEDURE — 86704 HEP B CORE ANTIBODY TOTAL: CPT

## 2023-07-26 PROCEDURE — 93005 ELECTROCARDIOGRAM TRACING: CPT

## 2023-07-26 RX ORDER — KETOROLAC TROMETHAMINE 30 MG/ML
15 SYRINGE (ML) INJECTION ONCE
Refills: 0 | Status: DISCONTINUED | OUTPATIENT
Start: 2023-07-26 | End: 2023-07-26

## 2023-07-26 RX ORDER — KETOROLAC TROMETHAMINE 30 MG/ML
30 SYRINGE (ML) INJECTION ONCE
Refills: 0 | Status: DISCONTINUED | OUTPATIENT
Start: 2023-07-26 | End: 2023-07-26

## 2023-07-26 RX ORDER — SODIUM CHLORIDE 9 MG/ML
1000 INJECTION, SOLUTION INTRAVENOUS ONCE
Refills: 0 | Status: COMPLETED | OUTPATIENT
Start: 2023-07-26 | End: 2023-07-26

## 2023-07-26 RX ORDER — SODIUM CHLORIDE 9 MG/ML
1000 INJECTION, SOLUTION INTRAVENOUS
Refills: 0 | Status: DISCONTINUED | OUTPATIENT
Start: 2023-07-26 | End: 2023-07-27

## 2023-07-26 RX ORDER — PANTOPRAZOLE SODIUM 20 MG/1
40 TABLET, DELAYED RELEASE ORAL
Refills: 0 | Status: DISCONTINUED | OUTPATIENT
Start: 2023-07-26 | End: 2023-08-01

## 2023-07-26 RX ORDER — CEFTRIAXONE 500 MG/1
2000 INJECTION, POWDER, FOR SOLUTION INTRAMUSCULAR; INTRAVENOUS EVERY 24 HOURS
Refills: 0 | Status: DISCONTINUED | OUTPATIENT
Start: 2023-07-26 | End: 2023-07-28

## 2023-07-26 RX ORDER — ENOXAPARIN SODIUM 100 MG/ML
40 INJECTION SUBCUTANEOUS EVERY 24 HOURS
Refills: 0 | Status: DISCONTINUED | OUTPATIENT
Start: 2023-07-26 | End: 2023-08-01

## 2023-07-26 RX ORDER — ACETAMINOPHEN 500 MG
650 TABLET ORAL EVERY 6 HOURS
Refills: 0 | Status: DISCONTINUED | OUTPATIENT
Start: 2023-07-26 | End: 2023-07-26

## 2023-07-26 RX ORDER — IBUPROFEN 200 MG
600 TABLET ORAL EVERY 6 HOURS
Refills: 0 | Status: DISCONTINUED | OUTPATIENT
Start: 2023-07-26 | End: 2023-07-27

## 2023-07-26 RX ADMIN — SODIUM CHLORIDE 1000 MILLILITER(S): 9 INJECTION, SOLUTION INTRAVENOUS at 01:03

## 2023-07-26 RX ADMIN — Medication 30 MILLIGRAM(S): at 06:37

## 2023-07-26 RX ADMIN — SODIUM CHLORIDE 500 MILLILITER(S): 9 INJECTION, SOLUTION INTRAVENOUS at 18:00

## 2023-07-26 RX ADMIN — Medication 650 MILLIGRAM(S): at 15:00

## 2023-07-26 RX ADMIN — Medication 600 MILLIGRAM(S): at 20:47

## 2023-07-26 RX ADMIN — SODIUM CHLORIDE 75 MILLILITER(S): 9 INJECTION, SOLUTION INTRAVENOUS at 16:41

## 2023-07-26 RX ADMIN — CEFTRIAXONE 100 MILLIGRAM(S): 500 INJECTION, POWDER, FOR SOLUTION INTRAMUSCULAR; INTRAVENOUS at 22:52

## 2023-07-26 RX ADMIN — Medication 30 MILLIGRAM(S): at 01:03

## 2023-07-26 RX ADMIN — SODIUM CHLORIDE 500 MILLILITER(S): 9 INJECTION, SOLUTION INTRAVENOUS at 17:41

## 2023-07-26 RX ADMIN — Medication 650 MILLIGRAM(S): at 15:22

## 2023-07-26 NOTE — ED PROVIDER NOTE - OBJECTIVE STATEMENT
47 year old female, past medical history uti, pyelitis, cholecystectomy, who presents with fever, body aches, chills that began x5 days ago. patient w/ eval x4 days ago for symptoms, negative workup @ that time. patient with outpatient fu and given symptomatic treatment with some improvement, patient has been taking tylenol frequently w/o improvement. reports persistent fever and generalized body aches prompting return to ed. reports associated dry cough and lightheadedness. denies hemoptysis, shortness of breath, vomiting/diarrhea, urinary symptoms, vaginal bleeding/discharge.

## 2023-07-26 NOTE — PATIENT PROFILE ADULT - FALL HARM RISK - HARM RISK INTERVENTIONS
Assistance with ambulation/Assistance OOB with selected safe patient handling equipment/Communicate Risk of Fall with Harm to all staff/Reinforce activity limits and safety measures with patient and family/Tailored Fall Risk Interventions/Use of alarms - bed, chair and/or voice tab/Visual Cue: Yellow wristband and red socks/Bed in lowest position, wheels locked, appropriate side rails in place/Call bell, personal items and telephone in reach/Instruct patient to call for assistance before getting out of bed or chair/Non-slip footwear when patient is out of bed/[place] to call system/Physically safe environment - no spills, clutter or unnecessary equipment/Purposeful Proactive Rounding/Room/bathroom lighting operational, light cord in reach

## 2023-07-26 NOTE — ED ADULT NURSE NOTE - NSFALLUNIVINTERV_ED_ALL_ED
Bed/Stretcher in lowest position, wheels locked, appropriate side rails in place/Call bell, personal items and telephone in reach/Instruct patient to call for assistance before getting out of bed/chair/stretcher/Non-slip footwear applied when patient is off stretcher/[place] to call system/Physically safe environment - no spills, clutter or unnecessary equipment/Purposeful proactive rounding/Room/bathroom lighting operational, light cord in reach

## 2023-07-26 NOTE — ED ADULT NURSE NOTE - OBJECTIVE STATEMENT
Pt is a 47 yr old female, presented to the ED with daughter c/o fever and generalized body aches. Pt stated she vomited a few days ago.

## 2023-07-26 NOTE — H&P ADULT - NSHPSOCIALHISTORY_GEN_ALL_CORE
Denies tobacco use, alcohol use, or recreational drug use. Denies tobacco use, alcohol use, or recreational drug use.    Mother  in her 70s unknown cause  Father  unknown age or cause

## 2023-07-26 NOTE — H&P ADULT - NSHPLABSRESULTS_GEN_ALL_CORE
LABS:                        13.1   3.21  )-----------( 142      ( 26 Jul 2023 01:12 )             39.1     07-26    140  |  105  |  5<L>  ----------------------------<  101<H>  4.2   |  24  |  0.6<L>    Ca    8.4      26 Jul 2023 04:10    TPro  5.7<L>  /  Alb  3.1<L>  /  TBili  0.8  /  DBili  0.5<H>  /  AST  221<H>  /  ALT  366<H>  /  AlkPhos  280<H>  07-26

## 2023-07-26 NOTE — H&P ADULT - NSHPPHYSICALEXAM_GEN_ALL_CORE
CONSTITUTIONAL: Well groomed, ill looking   Head: atraumatic, normocephalic, no tenderness to palpation over frontal and maxillary sinuses  EYES:No conjunctival or scleral injection, non-icteric  ENT: Oral mucosa with moist membranes. Normal dentition;  NECK: Supple, symmetric and without tracheal deviation   RESP: No respiratory distress, no use of accessory muscles; CTA b/l,  CV: RRR, +S1S2,  GI: Soft, NT, ND, no rebound, no guarding; no palpable masses  MSK:   Normal ROM without pain,, normal muscle strength/tone  SKIN: No rashes or ulcers noted;   NEURO: normal reflexes in upper and lower extremities, sensation intact in upper and lower extremities b/l to light touch   PSYCH: Appropriate insight/judgment; A+O x 3, mood and affect appropriate, recent/remote memory intact CONSTITUTIONAL:  ill looking   Head: atraumatic, normocephalic, no tenderness to palpation over frontal and maxillary sinuses  EYES:No conjunctival or scleral injection, non-icteric  ENT: Oral mucosa with moist membranes. Normal dentition;  NECK: Supple, symmetric and without tracheal deviation   RESP: No respiratory distress, no use of accessory muscles; CTA b/l,  CV: RRR, +S1S2,  GI: Soft, NT, ND, no rebound, no guarding; no palpable masses  MSK:   Normal ROM without pain,, normal muscle strength/tone  SKIN: No rashes or ulcers noted;   NEURO: normal reflexes in upper and lower extremities, sensation intact in upper and lower extremities b/l to light touch   PSYCH: Appropriate insight/judgment; A+O x 3, mood and affect appropriate, recent/remote memory intact

## 2023-07-26 NOTE — ED PROVIDER NOTE - CLINICAL SUMMARY MEDICAL DECISION MAKING FREE TEXT BOX
47-year-old female with past medical history of UTI, pyelitis, cholecystectomy presents to the ED for fever/body aches/chills since last Friday (5 days PTA).  Patient states she was here Saturday nights for these complaints and had lab work, CT head, x-ray done which was all negative for acute findings except for mild elevation in LFTs.  Told she probably has a viral illness and should follow-up with the primary care physician.  Patient states he has been taking Tylenol at home and some Pedialyte with no significant relief.  Feels that she has a frontal headache still, generalized body aches all over with no joint edema/erythema, no nausea/vomiting/diarrhea/nuchal rigidity.  Saw a another physician (Marcelle Nesbitt) and states she got "'3 shots of antibiotics" from her.  She does not know the name of the antibiotics or for what reason she was getting them for.  We cannot contact Dr. Nesbitt now as it's the middle of the night now.  Patient states she's still having generalized weakness and decreased appetite and some cough with some chest wall discomfort, feeling some gassiness, and lightheadedness, but no abdominal pain. Pt denies any dysuria or vaginal complaints    ALL test results (labs, XR, EKG, US) results reviewed. Recommend admission for her transaminitis as since last visit a few days ago, serum LFTs increased. To admit for workup. Hep panel sent for med team to follow

## 2023-07-26 NOTE — H&P ADULT - ATTENDING COMMENTS
8 y/o female w/ pmhx of postcholecyystectomy and UTI presenting with fever, body aches, cough, vomiting, headache and SOB for 5 days. Admitted for transaminitis and fever.     Vital Signs Last 24 Hrs  T(F): 98.1 (26 Jul 2023 16:00), Max: 102.1 (26 Jul 2023 08:50)  HR: 113 (26 Jul 2023 16:00) (105 - 120)  BP: 142/84 (26 Jul 2023 16:00) (135/87 - 142/84)  RR: 18 (26 Jul 2023 16:00) (18 - 20)  SpO2: 96% (26 Jul 2023 08:50) (95% - 98%)    PHYSICAL EXAM:  GENERAL: NAD, well-groomed, well-developed  HEAD:  Atraumatic, Normocephalic, no nuchal rigidity  EYES: EOMI, conjunctiva and sclera clear  ENMT: Moist mucous membranes, Good dentition, no thrush  NECK: Supple, No JVD  CHEST/LUNG: Clear to auscultation bilaterally, good air entry, non-labored breathing  HEART: RRR; S1/S2, No murmur  ABDOMEN: Soft, Nontender, Nondistended; Bowel sounds present  VASCULAR: Normal pulses, Normal capillary refill  EXTREMITIES: No calf tenderness, No cyanosis, No edema  LYMPH: Normal; No lymphadenopathy noted  SKIN: Warm, Intact  PSYCH: Normal mood, Normal affect  NERVOUS SYSTEM:  A/O x3, Good concentration; CN 2-12 intact, No focal deficits    #transaminitis  #post-cholecystecomy  #fever   #acetaminophen induced injury possible  - pt reports taking tylenol until 7/22 and then switched to ibuprofen   - in progress hepatitis panel  - elevated LFTs-ALT prominence  - repeat CMP  - RUQ US unremarkable   - hepatology consult requested  - lipase neg  - normal acetaminophen level    #sepsis unknown origin   #Headache - r/o temporal arteritis   - no nuchal rigidity, no photophobia   - Head CT 7/22 negative  - ID consult placed for fever of unknown etiology  - f/u ESR and CRP  - CXR - unremarkable   - blood Cxs pend, procal pending   - RVP negative   - covid neg  - infectious mono in progress  - lactate pending  - hepatitis panel is pending   - c/w LR 75cc/hr  - ibuprofen for headache, consider neuro consult if headache is not improving  - pt with hx or tension headaches   - [place] antibodies ordered   - Tick panel ordered     Diet: DASH/TLC  DVT PPX: heparin SubQ  GI PPX: PPI  Activity: WBAT  Code: Full code  Dispo: Med/surg  Pending: urine culture, blood culture, procal, esr, crp, hepatology consult, ID consult, fever curve, mono, [place], Tick panel ordered 6 y/o female w/ pmhx of postcholecyystectomy and UTI presenting with fever, body aches, cough, vomiting, headache and SOB for 5 days. Admitted for transaminitis and fever.     Vital Signs Last 24 Hrs  T(F): 98.1 (26 Jul 2023 16:00), Max: 102.1 (26 Jul 2023 08:50)  HR: 113 (26 Jul 2023 16:00) (105 - 120)  BP: 142/84 (26 Jul 2023 16:00) (135/87 - 142/84)  RR: 18 (26 Jul 2023 16:00) (18 - 20)  SpO2: 96% (26 Jul 2023 08:50) (95% - 98%)    PHYSICAL EXAM:  GENERAL: NAD, well-groomed, well-developed  HEAD:  Atraumatic, Normocephalic, no nuchal rigidity  EYES: EOMI, conjunctiva and sclera clear  ENMT: Moist mucous membranes, Good dentition, no thrush  NECK: Supple, No JVD  CHEST/LUNG: Clear to auscultation bilaterally, good air entry, non-labored breathing  HEART: RRR; S1/S2, No murmur  ABDOMEN: Soft, Nontender, Nondistended; Bowel sounds present  VASCULAR: Normal pulses, Normal capillary refill  EXTREMITIES: No calf tenderness, No cyanosis, No edema  LYMPH: Normal; No lymphadenopathy noted  SKIN: Warm, Intact  PSYCH: Normal mood, Normal affect  NERVOUS SYSTEM:  A/O x3, Good concentration; CN 2-12 intact, No focal deficits    #transaminitis  #post-cholecystecomy  #fever   #acetaminophen induced injury possible vs. tick born illness vs. other infectious agent  - pt reports taking tylenol until 7/22 and then switched to ibuprofen   - in progress hepatitis panel  - tick panel ordered   - elevated LFTs-ALT prominence  - repeat CMP  - RUQ US unremarkable   - hepatology consult requested  - lipase neg  - normal acetaminophen level    #sepsis unknown origin   #Headache - r/o temporal arteritis   #Leukopenia   - no nuchal rigidity, no photophobia   - Head CT 7/22 negative  - ID consult placed for fever of unknown etiology  - f/u ESR and CRP  - CXR - unremarkable   - blood Cxs pend, procal pending   - RVP negative   - covid neg  - infectious mono in progress  - lactate pending  - hepatitis panel is pending   - c/w LR 75cc/hr  - ibuprofen for headache, consider neuro consult if headache is not improving  - pt with hx or tension headaches   - [place] antibodies ordered   - Tick panel ordered - babesiosis could be consistent with lower wbc count, fever, and transaminitis      Diet: DASH/TLC  DVT PPX: heparin SubQ  GI PPX: PPI  Activity: WBAT  Code: Full code  Dispo: Med/surg  Pending: urine culture, blood culture, procal, esr, crp, hepatology consult, ID consult, fever curve, mono, [place], Tick panel ordered

## 2023-07-26 NOTE — ED PROVIDER NOTE - ATTENDING APP SHARED VISIT CONTRIBUTION OF CARE
47-year-old female with past medical history of UTI, pyelitis, cholecystectomy presents to the ED for fever/body aches/chills since last Friday (5 days PTA).  Patient states she was here Saturday nights for these complaints and had lab work, CT head, x-ray done which was all negative for acute findings except for mild elevation in LFTs.  Told she probably has a viral illness and should follow-up with the primary care physician.  Patient states he has been taking Tylenol at home and some Pedialyte with no significant relief.  Feels that she has a frontal headache still, generalized body aches all over with no joint edema/erythema, no nausea/vomiting/diarrhea/nuchal rigidity.  Saw a another physician (Marcelle Nesbitt) and states she got "'3 shots of antibiotics" from her.  She does not know the name of the antibiotics or for what reason she was getting them for.  We cannot contact Dr. Nesbitt now as it's the middle of the night now.  Patient states she's still having generalized weakness and decreased appetite and some cough with some chest wall discomfort, feeling some gassiness, and lightheadedness, but no abdominal pain. Pt denies any dysuria or vaginal complaints    On exam vital signs noted (has mild tachycardia heart rate 108), patient NAD, NCAT, PERRL, mild facial flushing, no scleral icterus or conjunctival injection, no cervical lymphadenopathy or nuchal rigidity, Lungs: CTA B, Chest Wall mild tenderness with chest wall palpation, Heart: Regular S1-S2.,  Abdomen: Soft, mild epigastric tenderness, + bowel sounds, no mass, no rebound or guarding, no distention.  EXT: No pedal edema, no calf tenderness, distal pulse intact    A/P patient is still presenting as a viral illness.  Has been taking predominantly Tylenol which provides no significant anti-inflammatory function therefore recommend ibuprofen as needed given she has no PUD history.  Will check labs again to make sure there is no worsening liver function tests (which were slightly elevated on her previous lab work), repeat chest x-ray to make sure there is no signs of infection at this point, and will give her IV fluids with Toradol and reassess.     ALL: nkda  SH no smoking or etoh  PMD [facility] [address]  [facility] last week, regular periods  Pharmacy [facility]

## 2023-07-26 NOTE — ED PROVIDER NOTE - PHYSICAL EXAMINATION
CONSTITUTIONAL: Well-developed; well-nourished; in no acute distress, nontoxic appearing  SKIN: skin exam is warm and dry  HEAD: Normocephalic; atraumatic.  EYES:  conjunctiva and sclera clear.  ENT: Dry MM   NECK: ROM intact.  CARD: S1, S2 normal, no murmur  RESP: Good air movement bilaterally, no increased wob   ABD: soft; non-distended; non-tender   EXT: Normal ROM   NEURO: awake, alert, following commands, oriented, grossly unremarkable. No Focal deficits. GCS 15.   PSYCH: Cooperative, appropriate.

## 2023-07-26 NOTE — H&P ADULT - HISTORY OF PRESENT ILLNESS
46 y/o F w/ pmhx pf cholecystectomy who presents with fever, sweats, chills, body aches, dry cough, headache, shortness of breath, and one episode of vomiting for 5 days. Patient has been taking tylenol without improvement. Patient states that she has bilateral headache that worsens with movement or walking. It feels like pressure building up in her head. No recent travel or sick contacts.     Denies eye pain, ear pain, sore throat, constipation, abdominal pain, diarrhea, urinary symptoms.     ED Course:   Vital Signs Last 24 Hrs  T(C): 38.9 (26 Jul 2023 08:50), Max: 38.9 (26 Jul 2023 08:50)  T(F): 102.1 (26 Jul 2023 08:50), Max: 102.1 (26 Jul 2023 08:50)  HR: 105 (26 Jul 2023 08:50) (105 - 120)  BP: 139/75 (26 Jul 2023 08:50) (135/87 - 140/78)  BP(mean): 101 (26 Jul 2023 08:50) (101 - 101)  RR: 20 (26 Jul 2023 08:50) (20 - 20)  SpO2: 96% (26 Jul 2023 08:50) (95% - 98%)    Parameters below as of 26 Jul 2023 08:50  Patient On (Oxygen Delivery Method): room air    In the ED: CXR was unremarkable. LFT were elevated and a RUQ US was performed that was unremarkable. CTH NC no acute intracranial bleed or masses.

## 2023-07-26 NOTE — H&P ADULT - ASSESSMENT
48 y/o female w/ pmhx of postcholecyystectomy and UTI presenting with fever, body aches, cough, vomiting, and SOB for 5 days. Admitted for transaminitis and fever.     #transaminitis  #post-cholecystecomy  #fever   - elevated LFTs and ALP   - repeat CMP  - RUQ US unremarkable   - hepatology consult       #suspected URI vs. PNA  - CXR - unremarkable   - blood Cxs pend  - RVP pend  - CTH NC neg    Diet: DASH/TLC  DVT PPX: heparin SubQ  GI PPX: PPI  Activity: WBAT  Code: Full code  Dispo: Med/surg 48 y/o female w/ pmhx of postcholecyystectomy and UTI presenting with fever, body aches, cough, vomiting, and SOB for 5 days. Admitted for transaminitis and fever.     #transaminitis  #post-cholecystecomy  #fever   - f/u hepatitis panel  - elevated LFTs and ALP   - repeat CMP  - RUQ US unremarkable   - hepatology consult   - lipase neg    #suspected URI vs. PNA  - CXR - unremarkable   - blood Cxs pend  - RVP pend  - infectious mono pend  - CTH NC neg    Diet: DASH/TLC  DVT PPX: heparin SubQ  GI PPX: PPI  Activity: WBAT  Code: Full code  Dispo: Med/surg 48 y/o female w/ pmhx of postcholecyystectomy and UTI presenting with fever, body aches, cough, vomiting, and SOB for 5 days. Admitted for transaminitis and fever.     #transaminitis  #post-cholecystecomy  #fever   - f/u hepatitis panel  - elevated LFTs and ALP   - repeat CMP  - RUQ US unremarkable   - hepatology consult   - lipase neg    #suspected URI vs. PNA  - CXR - unremarkable   - blood Cxs pend  - RVP pend  - infectious mono pend  - CTH NC neg  - c/w supportive care  - c/w LR 75cc/hr    Diet: DASH/TLC  DVT PPX: heparin SubQ  GI PPX: PPI  Activity: WBAT  Code: Full code  Dispo: Med/surg 48 y/o female w/ pmhx of postcholecyystectomy and UTI presenting with fever, body aches, cough, vomiting, and SOB for 5 days. Admitted for transaminitis and fever.     #transaminitis  #post-cholecystecomy  #fever   #acetaminophen induced injury?  - lactate f/u   - f/u hepatitis panel  - elevated LFTs and ALP   - repeat CMP  - RUQ US unremarkable   - hepatology consult   - lipase neg  - normal acetaminophen level    #suspected URI vs. bacteremia  #SIRS criteria met  - CXR - unremarkable   - blood Cxs pend  - RVP pend  - infectious mono pend  - CTH NC neg  - c/w supportive care  - c/w LR 75cc/hr    Diet: DASH/TLC  DVT PPX: heparin SubQ  GI PPX: PPI  Activity: WBAT  Code: Full code  Dispo: Med/surg 46 y/o female w/ pmhx of postcholecyystectomy and UTI presenting with fever, body aches, cough, vomiting, and SOB for 5 days. Admitted for transaminitis and fever.     #transaminitis  #post-cholecystecomy  #fever   #acetaminophen induced injury?  - lactate f/u   - f/u hepatitis panel  - elevated LFTs and ALP   - repeat CMP  - RUQ US unremarkable   - hepatology consult   - lipase neg  - normal acetaminophen level    #suspected URI vs. bacteremia  #SIRS criteria met  - ID consult placed for fever of unknown etiology  - f/u ESR and CRP  - CXR - unremarkable   - blood Cxs pend  - RVP pend  - covid neg  - infectious mono pend  - CTH NC neg  - c/w supportive care  - c/w LR 75cc/hr    Diet: DASH/TLC  DVT PPX: heparin SubQ  GI PPX: PPI  Activity: WBAT  Code: Full code  Dispo: Med/surg 48 y/o female w/ pmhx of postcholecyystectomy and UTI presenting with fever, body aches, cough, vomiting, and SOB for 5 days. Admitted for transaminitis and fever.     #transaminitis  #post-cholecystecomy  #fever   #acetaminophen induced injury   - lactate f/u   - f/u hepatitis panel  - elevated LFTs and ALP   - repeat CMP  - RUQ US unremarkable   - hepatology consult   - lipase neg  - normal acetaminophen level    #suspected URI vs. bacteremia  #SIRS criteria met  - ID consult placed for fever of unknown etiology  - f/u ESR and CRP  - CXR - unremarkable   - blood Cxs pend  - RVP pend  - covid neg  - infectious mono pend  - CTH NC neg  - c/w supportive care  - c/w LR 75cc/hr    Diet: DASH/TLC  DVT PPX: heparin SubQ  GI PPX: PPI  Activity: WBAT  Code: Full code  Dispo: Med/surg

## 2023-07-27 LAB
ALBUMIN SERPL ELPH-MCNC: 3 G/DL — LOW (ref 3.5–5.2)
ALBUMIN SERPL ELPH-MCNC: 3.2 G/DL — LOW (ref 3.5–5.2)
ALP SERPL-CCNC: 287 U/L — HIGH (ref 30–115)
ALP SERPL-CCNC: 314 U/L — HIGH (ref 30–115)
ALT FLD-CCNC: 347 U/L — HIGH (ref 0–41)
ALT FLD-CCNC: 376 U/L — HIGH (ref 0–41)
ANION GAP SERPL CALC-SCNC: 10 MMOL/L — SIGNIFICANT CHANGE UP (ref 7–14)
ANION GAP SERPL CALC-SCNC: 11 MMOL/L — SIGNIFICANT CHANGE UP (ref 7–14)
ANION GAP SERPL CALC-SCNC: 9 MMOL/L — SIGNIFICANT CHANGE UP (ref 7–14)
APPEARANCE UR: ABNORMAL
APTT BLD: 44.8 SEC — HIGH (ref 27–39.2)
AST SERPL-CCNC: 192 U/L — HIGH (ref 0–41)
AST SERPL-CCNC: 235 U/L — HIGH (ref 0–41)
BACTERIA # UR AUTO: NEGATIVE — SIGNIFICANT CHANGE UP
BASOPHILS # BLD AUTO: 0.02 K/UL — SIGNIFICANT CHANGE UP (ref 0–0.2)
BASOPHILS # BLD AUTO: 0.02 K/UL — SIGNIFICANT CHANGE UP (ref 0–0.2)
BASOPHILS NFR BLD AUTO: 0.2 % — SIGNIFICANT CHANGE UP (ref 0–1)
BASOPHILS NFR BLD AUTO: 0.2 % — SIGNIFICANT CHANGE UP (ref 0–1)
BILIRUB SERPL-MCNC: 0.6 MG/DL — SIGNIFICANT CHANGE UP (ref 0.2–1.2)
BILIRUB SERPL-MCNC: 0.7 MG/DL — SIGNIFICANT CHANGE UP (ref 0.2–1.2)
BILIRUB UR-MCNC: NEGATIVE — SIGNIFICANT CHANGE UP
BUN SERPL-MCNC: 7 MG/DL — LOW (ref 10–20)
BUN SERPL-MCNC: 8 MG/DL — LOW (ref 10–20)
BUN SERPL-MCNC: 9 MG/DL — LOW (ref 10–20)
CALCIUM SERPL-MCNC: 7.8 MG/DL — LOW (ref 8.4–10.4)
CALCIUM SERPL-MCNC: 7.9 MG/DL — LOW (ref 8.4–10.5)
CALCIUM SERPL-MCNC: 8.1 MG/DL — LOW (ref 8.4–10.4)
CHLORIDE SERPL-SCNC: 95 MMOL/L — LOW (ref 98–110)
CHLORIDE SERPL-SCNC: 96 MMOL/L — LOW (ref 98–110)
CHLORIDE SERPL-SCNC: 98 MMOL/L — SIGNIFICANT CHANGE UP (ref 98–110)
CO2 SERPL-SCNC: 23 MMOL/L — SIGNIFICANT CHANGE UP (ref 17–32)
CO2 SERPL-SCNC: 25 MMOL/L — SIGNIFICANT CHANGE UP (ref 17–32)
CO2 SERPL-SCNC: 27 MMOL/L — SIGNIFICANT CHANGE UP (ref 17–32)
COLOR SPEC: YELLOW — SIGNIFICANT CHANGE UP
CREAT SERPL-MCNC: 0.6 MG/DL — LOW (ref 0.7–1.5)
CREAT SERPL-MCNC: 0.6 MG/DL — LOW (ref 0.7–1.5)
CREAT SERPL-MCNC: 0.7 MG/DL — SIGNIFICANT CHANGE UP (ref 0.7–1.5)
DIFF PNL FLD: ABNORMAL
EGFR: 107 ML/MIN/1.73M2 — SIGNIFICANT CHANGE UP
EGFR: 111 ML/MIN/1.73M2 — SIGNIFICANT CHANGE UP
EGFR: 111 ML/MIN/1.73M2 — SIGNIFICANT CHANGE UP
EOSINOPHIL # BLD AUTO: 0 K/UL — SIGNIFICANT CHANGE UP (ref 0–0.7)
EOSINOPHIL # BLD AUTO: 0 K/UL — SIGNIFICANT CHANGE UP (ref 0–0.7)
EOSINOPHIL NFR BLD AUTO: 0 % — SIGNIFICANT CHANGE UP (ref 0–8)
EOSINOPHIL NFR BLD AUTO: 0 % — SIGNIFICANT CHANGE UP (ref 0–8)
EPI CELLS # UR: 13 /HPF — HIGH (ref 0–5)
GGT SERPL-CCNC: 576 U/L — HIGH (ref 1–40)
GLUCOSE SERPL-MCNC: 133 MG/DL — HIGH (ref 70–99)
GLUCOSE SERPL-MCNC: 145 MG/DL — HIGH (ref 70–99)
GLUCOSE SERPL-MCNC: 165 MG/DL — HIGH (ref 70–99)
GLUCOSE UR QL: SIGNIFICANT CHANGE UP
HAV IGM SER-ACNC: SIGNIFICANT CHANGE UP
HBV CORE IGM SER-ACNC: SIGNIFICANT CHANGE UP
HBV SURFACE AG SER-ACNC: SIGNIFICANT CHANGE UP
HCG UR QL: NEGATIVE — SIGNIFICANT CHANGE UP
HCT VFR BLD CALC: 36 % — LOW (ref 37–47)
HCT VFR BLD CALC: 38.5 % — SIGNIFICANT CHANGE UP (ref 37–47)
HCV AB S/CO SERPL IA: 0.11 S/CO — SIGNIFICANT CHANGE UP (ref 0–0.99)
HCV AB SERPL-IMP: SIGNIFICANT CHANGE UP
HGB BLD-MCNC: 12.1 G/DL — SIGNIFICANT CHANGE UP (ref 12–16)
HGB BLD-MCNC: 12.8 G/DL — SIGNIFICANT CHANGE UP (ref 12–16)
HYALINE CASTS # UR AUTO: 16 /LPF — HIGH (ref 0–7)
IMM GRANULOCYTES NFR BLD AUTO: 0.5 % — HIGH (ref 0.1–0.3)
IMM GRANULOCYTES NFR BLD AUTO: 0.5 % — HIGH (ref 0.1–0.3)
INR BLD: 1.04 RATIO — SIGNIFICANT CHANGE UP (ref 0.65–1.3)
KETONES UR-MCNC: NEGATIVE — SIGNIFICANT CHANGE UP
LACTATE SERPL-SCNC: 1.4 MMOL/L — SIGNIFICANT CHANGE UP (ref 0.7–2)
LEUKOCYTE ESTERASE UR-ACNC: NEGATIVE — SIGNIFICANT CHANGE UP
LIDOCAIN IGE QN: 22 U/L — SIGNIFICANT CHANGE UP (ref 7–60)
LYMPHOCYTES # BLD AUTO: 1.69 K/UL — SIGNIFICANT CHANGE UP (ref 1.2–3.4)
LYMPHOCYTES # BLD AUTO: 1.94 K/UL — SIGNIFICANT CHANGE UP (ref 1.2–3.4)
LYMPHOCYTES # BLD AUTO: 17 % — LOW (ref 20.5–51.1)
LYMPHOCYTES # BLD AUTO: 22.9 % — SIGNIFICANT CHANGE UP (ref 20.5–51.1)
MAGNESIUM SERPL-MCNC: 1.6 MG/DL — LOW (ref 1.8–2.4)
MCHC RBC-ENTMCNC: 30 PG — SIGNIFICANT CHANGE UP (ref 27–31)
MCHC RBC-ENTMCNC: 30.2 PG — SIGNIFICANT CHANGE UP (ref 27–31)
MCHC RBC-ENTMCNC: 33.2 G/DL — SIGNIFICANT CHANGE UP (ref 32–37)
MCHC RBC-ENTMCNC: 33.6 G/DL — SIGNIFICANT CHANGE UP (ref 32–37)
MCV RBC AUTO: 89.8 FL — SIGNIFICANT CHANGE UP (ref 81–99)
MCV RBC AUTO: 90.4 FL — SIGNIFICANT CHANGE UP (ref 81–99)
MONOCYTES # BLD AUTO: 0.24 K/UL — SIGNIFICANT CHANGE UP (ref 0.1–0.6)
MONOCYTES # BLD AUTO: 0.27 K/UL — SIGNIFICANT CHANGE UP (ref 0.1–0.6)
MONOCYTES NFR BLD AUTO: 2.4 % — SIGNIFICANT CHANGE UP (ref 1.7–9.3)
MONOCYTES NFR BLD AUTO: 3.2 % — SIGNIFICANT CHANGE UP (ref 1.7–9.3)
NEUTROPHILS # BLD AUTO: 6.19 K/UL — SIGNIFICANT CHANGE UP (ref 1.4–6.5)
NEUTROPHILS # BLD AUTO: 7.95 K/UL — HIGH (ref 1.4–6.5)
NEUTROPHILS NFR BLD AUTO: 73.2 % — SIGNIFICANT CHANGE UP (ref 42.2–75.2)
NEUTROPHILS NFR BLD AUTO: 79.9 % — HIGH (ref 42.2–75.2)
NITRITE UR-MCNC: NEGATIVE — SIGNIFICANT CHANGE UP
NRBC # BLD: 0 /100 WBCS — SIGNIFICANT CHANGE UP (ref 0–0)
NRBC # BLD: 0 /100 WBCS — SIGNIFICANT CHANGE UP (ref 0–0)
OSMOLALITY UR: 642 MOS/KG — SIGNIFICANT CHANGE UP (ref 50–1200)
PH UR: 6.5 — SIGNIFICANT CHANGE UP (ref 5–8)
PLATELET # BLD AUTO: 104 K/UL — LOW (ref 130–400)
PLATELET # BLD AUTO: 109 K/UL — LOW (ref 130–400)
PMV BLD: 10.8 FL — HIGH (ref 7.4–10.4)
PMV BLD: 11.7 FL — HIGH (ref 7.4–10.4)
POTASSIUM SERPL-MCNC: 3.7 MMOL/L — SIGNIFICANT CHANGE UP (ref 3.5–5)
POTASSIUM SERPL-MCNC: 3.8 MMOL/L — SIGNIFICANT CHANGE UP (ref 3.5–5)
POTASSIUM SERPL-MCNC: 3.9 MMOL/L — SIGNIFICANT CHANGE UP (ref 3.5–5)
POTASSIUM SERPL-SCNC: 3.7 MMOL/L — SIGNIFICANT CHANGE UP (ref 3.5–5)
POTASSIUM SERPL-SCNC: 3.8 MMOL/L — SIGNIFICANT CHANGE UP (ref 3.5–5)
POTASSIUM SERPL-SCNC: 3.9 MMOL/L — SIGNIFICANT CHANGE UP (ref 3.5–5)
POTASSIUM UR-SCNC: 66 MMOL/L — SIGNIFICANT CHANGE UP
PROCALCITONIN SERPL-MCNC: 0.35 NG/ML — HIGH (ref 0.02–0.1)
PROT ?TM UR-MCNC: 57 MG/DLG/24H — SIGNIFICANT CHANGE UP
PROT SERPL-MCNC: 5.6 G/DL — LOW (ref 6–8)
PROT SERPL-MCNC: 5.9 G/DL — LOW (ref 6–8)
PROT UR-MCNC: ABNORMAL
PROTHROM AB SERPL-ACNC: 11.9 SEC — SIGNIFICANT CHANGE UP (ref 9.95–12.87)
RAPID RVP RESULT: SIGNIFICANT CHANGE UP
RBC # BLD: 4.01 M/UL — LOW (ref 4.2–5.4)
RBC # BLD: 4.26 M/UL — SIGNIFICANT CHANGE UP (ref 4.2–5.4)
RBC # FLD: 16.3 % — HIGH (ref 11.5–14.5)
RBC # FLD: 16.3 % — HIGH (ref 11.5–14.5)
RBC CASTS # UR COMP ASSIST: 21 /HPF — HIGH (ref 0–4)
SARS-COV-2 RNA SPEC QL NAA+PROBE: SIGNIFICANT CHANGE UP
SODIUM SERPL-SCNC: 128 MMOL/L — LOW (ref 135–146)
SODIUM SERPL-SCNC: 132 MMOL/L — LOW (ref 135–146)
SODIUM SERPL-SCNC: 134 MMOL/L — LOW (ref 135–146)
SODIUM UR-SCNC: <20 MMOL/L — SIGNIFICANT CHANGE UP
SP GR SPEC: 1.03 — SIGNIFICANT CHANGE UP (ref 1.01–1.03)
TROPONIN T SERPL-MCNC: <0.01 NG/ML — SIGNIFICANT CHANGE UP
UROBILINOGEN FLD QL: ABNORMAL
UUN UR-MCNC: 1031 MG/DL — SIGNIFICANT CHANGE UP
WBC # BLD: 8.46 K/UL — SIGNIFICANT CHANGE UP (ref 4.8–10.8)
WBC # BLD: 9.95 K/UL — SIGNIFICANT CHANGE UP (ref 4.8–10.8)
WBC # FLD AUTO: 8.46 K/UL — SIGNIFICANT CHANGE UP (ref 4.8–10.8)
WBC # FLD AUTO: 9.95 K/UL — SIGNIFICANT CHANGE UP (ref 4.8–10.8)
WBC UR QL: 3 /HPF — SIGNIFICANT CHANGE UP (ref 0–5)

## 2023-07-27 PROCEDURE — 93010 ELECTROCARDIOGRAM REPORT: CPT

## 2023-07-27 PROCEDURE — 71260 CT THORAX DX C+: CPT | Mod: 26

## 2023-07-27 PROCEDURE — 71045 X-RAY EXAM CHEST 1 VIEW: CPT | Mod: 26

## 2023-07-27 PROCEDURE — 99223 1ST HOSP IP/OBS HIGH 75: CPT

## 2023-07-27 PROCEDURE — 74178 CT ABD&PLV WO CNTR FLWD CNTR: CPT | Mod: 26

## 2023-07-27 PROCEDURE — 99232 SBSQ HOSP IP/OBS MODERATE 35: CPT

## 2023-07-27 RX ORDER — ONDANSETRON 8 MG/1
4 TABLET, FILM COATED ORAL ONCE
Refills: 0 | Status: COMPLETED | OUTPATIENT
Start: 2023-07-27 | End: 2023-07-27

## 2023-07-27 RX ORDER — DIATRIZOATE MEGLUMINE 180 MG/ML
30 INJECTION, SOLUTION INTRAVESICAL ONCE
Refills: 0 | Status: DISCONTINUED | OUTPATIENT
Start: 2023-07-27 | End: 2023-08-01

## 2023-07-27 RX ORDER — IOHEXOL 300 MG/ML
30 INJECTION, SOLUTION INTRAVENOUS ONCE
Refills: 0 | Status: DISCONTINUED | OUTPATIENT
Start: 2023-07-27 | End: 2023-07-27

## 2023-07-27 RX ORDER — FAMOTIDINE 10 MG/ML
20 INJECTION INTRAVENOUS ONCE
Refills: 0 | Status: COMPLETED | OUTPATIENT
Start: 2023-07-27 | End: 2023-07-27

## 2023-07-27 RX ORDER — ACETAMINOPHEN 500 MG
650 TABLET ORAL ONCE
Refills: 0 | Status: COMPLETED | OUTPATIENT
Start: 2023-07-27 | End: 2023-07-27

## 2023-07-27 RX ORDER — MAGNESIUM SULFATE 500 MG/ML
2 VIAL (ML) INJECTION ONCE
Refills: 0 | Status: COMPLETED | OUTPATIENT
Start: 2023-07-27 | End: 2023-07-27

## 2023-07-27 RX ADMIN — Medication 600 MILLIGRAM(S): at 11:24

## 2023-07-27 RX ADMIN — Medication 600 MILLIGRAM(S): at 06:39

## 2023-07-27 RX ADMIN — FAMOTIDINE 20 MILLIGRAM(S): 10 INJECTION INTRAVENOUS at 17:02

## 2023-07-27 RX ADMIN — Medication 650 MILLIGRAM(S): at 12:55

## 2023-07-27 RX ADMIN — Medication 25 GRAM(S): at 11:24

## 2023-07-27 RX ADMIN — PANTOPRAZOLE SODIUM 40 MILLIGRAM(S): 20 TABLET, DELAYED RELEASE ORAL at 06:39

## 2023-07-27 RX ADMIN — Medication 600 MILLIGRAM(S): at 05:26

## 2023-07-27 RX ADMIN — Medication 100 MILLIGRAM(S): at 12:54

## 2023-07-27 RX ADMIN — SODIUM CHLORIDE 75 MILLILITER(S): 9 INJECTION, SOLUTION INTRAVENOUS at 11:24

## 2023-07-27 RX ADMIN — Medication 600 MILLIGRAM(S): at 17:02

## 2023-07-27 RX ADMIN — ENOXAPARIN SODIUM 40 MILLIGRAM(S): 100 INJECTION SUBCUTANEOUS at 05:27

## 2023-07-27 RX ADMIN — CEFTRIAXONE 100 MILLIGRAM(S): 500 INJECTION, POWDER, FOR SOLUTION INTRAMUSCULAR; INTRAVENOUS at 21:17

## 2023-07-27 RX ADMIN — Medication 100 MILLIGRAM(S): at 17:02

## 2023-07-27 RX ADMIN — ONDANSETRON 4 MILLIGRAM(S): 8 TABLET, FILM COATED ORAL at 05:27

## 2023-07-27 RX ADMIN — Medication 650 MILLIGRAM(S): at 17:02

## 2023-07-27 NOTE — PROGRESS NOTE ADULT - ASSESSMENT
6 y/o female w/ pmhx of postcholecyystectomy and UTI presenting with fever, body aches, cough, vomiting, headache and SOB for 5 days. Admitted for transaminitis and fever.       #transaminitis  #post-cholecystecomy  #fever   #acetaminophen induced injury possible vs. tick born illness vs. other infectious agent  - pt reports taking tylenol until 7/22 and then switched to ibuprofen   - tick panel ordered   - elevated LFTs-ALT prominence  - repeat CMP  - RUQ US unremarkable   - hepatology consult requested, follow recommendations , follow lab work recommended by hepatology   - lipase neg  - normal acetaminophen level  monitor for fever spikes     #sepsis unknown origin   #Headache - r/o temporal arteritis?   #Leukopenia, resolved   - no nuchal rigidity, no photophobia   - Head CT 7/22 negative  - ID consult placed for fever of unknown etiology  - ESR 10 and CRP51.4   - CXR - unremarkable   - blood Cxs pend, procal pending   - RVP negative   - covid neg  - infectious mono in progress  - lactate normal   - follow hepatitis pane  - c/w LR 75cc/hr  - ibuprofen for headache, consider neuro consult if headache is not improving  - pt with hx or tension headaches   - [place] antibodies ordered , pending   - Tick panel ordered - babesiosis could be consistent with lower wbc count, fever, and transaminitis    follow ID recommendations   follow hepatology reocmmendations     Diet: DASH/TLC  DVT PPX: heparin SubQ  GI PPX: PPI  Activity: WBAT  Code: Full code  Dispo: Med/surg    follow up:: Acute having fever spikes, urine culture, blood culture, procal, esr, crp, hepatology consult, ID consult, fever curve, mono, [place], Tick panel ordered. follow labs recommended by hepatology.

## 2023-07-27 NOTE — PROGRESS NOTE ADULT - SUBJECTIVE AND OBJECTIVE BOX
Medical Student Note    Patient Information:  CAMILA BENNETT / 47y / Female / MRN#:[id number]    Hospital Day: 1d      Interval History:  Patient seen and examined at bedside. Pt appears lethargic and continues to c/o headache. As per nurse, pt had vomiting episode x1 overnight, non since and temp of 100.8, which has resolved now.      Past Medical History:  Pyelitis    Urinary tract infection      Past Surgical History:  UTI (urinary tract infection)    No significant past surgical history    History of cholecystectomy      Allergies:  No Known Allergies    Medications:  PRN:  ibuprofen  Tablet. 600 milliGRAM(s) Oral every 6 hours PRN Temp greater or equal to 38C (100.4F), Moderate Pain (4 - 6)    Standing:  cefTRIAXone   IVPB 2000 milliGRAM(s) IV Intermittent every 24 hours  enoxaparin Injectable 40 milliGRAM(s) SubCutaneous every 24 hours  lactated ringers. 1000 milliLiter(s) (75 mL/Hr) IV Continuous <Continuous>  lactated ringers. 1000 milliLiter(s) (500 mL/Hr) IV Continuous <Continuous>  pantoprazole    Tablet 40 milliGRAM(s) Oral before breakfast    Home:    Vitals:  T(C): 36.2, Max: 38.2 (07-27-23 @ 00:00)  T(F): 97.2, Max: 100.8 (07-27-23 @ 00:00)  HR: 102 (102 - 113)  BP: 109/67 (109/67 - 142/84)  RR: 18 (18 - 18)  SpO2: 97% (97% - 97%)    Physical Exam:  General: Awake, Alert. Not in acute distress.  Head: Normocephalic atraumatic.  Neck: Supple  Heart: Regular rate and rhythm; S1, S2; No murmurs.  Lungs: Clear to auscultation bilaterally.  Abdomen: Soft, nontender, nondistended.  Extremities: No edema in upper or lower extremities.  Neuro: AAOx3, No focal deficits.    Labs:  CBC (07-27 @ 05:36)                        Hgb: 12.8   WBC: 8.46  )-----------------( Plts: 109                              Hct: 38.5     Chem (07-27 @ 05:36)  Na: 134  |     Cl: 98     |  BUN: 8   -----------------------------------------< Gluc: 145    K: 3.9   |    HCO3: 25  |  Cr: 0.6    Ca 8.1 (07-27 @ 05:36)  Mg 1.6 (07-27 @ 05:36)    LFTs (07-27 @ 05:36)  TPro 5.9  /  Alb 3.2  TBili 0.7  /  DBili       /    /  AlkPhos 314    Cardiac Markers (07-26 @ 21:57)  Lactate 1.4  ESR 10      Microbiology:  Culture - Urine (collected 07-22 @ 18:25)  Source: Clean Catch Clean Catch (Midstream)  Final Report (07-24 @ 16:22):    >=3 organisms. Probable collection contamination.       Medical Student Note    Patient Information:  CAMILA BENNETT / 47y / Female / MRN#:[id number]    Hospital Day: 1d      Interval History:  Patient seen and examined at bedside. This AM Pt appears lethargic and continues to c/o headache. As per nurse, pt had vomiting episode x1 overnight, non since and temp of 100.8.     Past Medical History:  Pyelitis    Urinary tract infection      Past Surgical History:  UTI (urinary tract infection)    No significant past surgical history    History of cholecystectomy      Allergies:  No Known Allergies    Medications:  PRN:  ibuprofen  Tablet. 600 milliGRAM(s) Oral every 6 hours PRN Temp greater or equal to 38C (100.4F), Moderate Pain (4 - 6)    Standing:  cefTRIAXone   IVPB 2000 milliGRAM(s) IV Intermittent every 24 hours  enoxaparin Injectable 40 milliGRAM(s) SubCutaneous every 24 hours  lactated ringers. 1000 milliLiter(s) (75 mL/Hr) IV Continuous <Continuous>  lactated ringers. 1000 milliLiter(s) (500 mL/Hr) IV Continuous <Continuous>  pantoprazole    Tablet 40 milliGRAM(s) Oral before breakfast    Home:    Vitals:  T(C): 36.2, Max: 38.2 (07-27-23 @ 00:00)  T(F): 97.2, Max: 100.8 (07-27-23 @ 00:00)  HR: 102 (102 - 113)  BP: 109/67 (109/67 - 142/84)  RR: 18 (18 - 18)  SpO2: 97% (97% - 97%)    Physical Exam:  General: Awake, lethargic, acute stress secondary to c/o difficulty breathing   Heart: Regular rate and rhythm;   Lungs: Clear to auscultation bilaterally  Abdomen: Soft, nondistended, mild tenderness to epigastric region   Extremities: No edema in upper or lower extremities.  Neuro: AAOx3, No focal deficits.    Labs:  CBC (07-27 @ 05:36)                        Hgb: 12.8   WBC: 8.46  )-----------------( Plts: 109                              Hct: 38.5     Chem (07-27 @ 05:36)  Na: 134  |     Cl: 98     |  BUN: 8   -----------------------------------------< Gluc: 145    K: 3.9   |    HCO3: 25  |  Cr: 0.6    Ca 8.1 (07-27 @ 05:36)  Mg 1.6 (07-27 @ 05:36)    LFTs (07-27 @ 05:36)  TPro 5.9  /  Alb 3.2  TBili 0.7  /  DBili       /    /  AlkPhos 314    Cardiac Markers (07-26 @ 21:57)  Lactate 1.4  ESR 10      Microbiology:  Culture - Urine (collected 07-22 @ 18:25)  Source: Clean Catch Clean Catch (Midstream)  Final Report (07-24 @ 16:22):    >=3 organisms. Probable collection contamination.      RADIOLOGY, ECG, & ADDITIONAL TESTS:  12 Lead ECG:   Ventricular Rate 82 BPM    Atrial Rate 82 BPM    P-R Interval 140 ms    QRS Duration 120 ms    Q-T Interval 410 ms    QTC Calculation(Bazett) 479 ms    P Axis 16 degrees    R Axis 3 degrees    T Axis 5 degrees    Diagnosis Line Normal sinus rhythm  Right bundle branch block  Abnormal ECG    Confirmed by ROBERT VANEGAS MD ([id number]) on 7/26/2023 6:55:38 AM (07-26-23 @ 01:59)    CT Head No Cont:   ACC: [id number] EXAM:  CT BRAIN   ORDERED BY: JAIRO ADAMS     PROCEDURE DATE:  07/22/2023          INTERPRETATION:  CLINICAL INDICATION: Headache    Technique: CT of the head was performed without contrast.    Multiple contiguous axial images were acquired from the skullbase to the   vertex without the administration of intravenous contrast.  Coronal and   sagittal reformations were made.    COMPARISON: None    FINDINGS:    The ventricles and sulci are unremarkable in appearance.     There isno intraparenchymal hematoma, mass effect or midline shift. No   abnormal extra-axial fluid collections are present.    The calvarium is intact. The visualized intraorbital compartments,   paranasal sinuses and mastoid complexes appear free of acute disease.    IMPRESSION:    No acute intracranial pathology. No evidence of midline shift, mass   effect or intracranial hemorrhage.    --- End of Report ---   Medical Student Note    Patient Information:  CAMILA BENNETT / 47y / Female / MRN#:[id number]    Hospital Day: 1d      Interval History:  Patient seen and examined at bedside. This AM Pt appears lethargic and continues to c/o headache. As per nurse, pt had vomiting episode x1 overnight, non since and temp of 100.8.     Past Medical History:  Pyelitis    Urinary tract infection      Past Surgical History:  UTI (urinary tract infection)    No significant past surgical history    History of cholecystectomy      Allergies:  No Known Allergies    Medications:  PRN:  ibuprofen  Tablet. 600 milliGRAM(s) Oral every 6 hours PRN Temp greater or equal to 38C (100.4F), Moderate Pain (4 - 6)    Standing:  cefTRIAXone   IVPB 2000 milliGRAM(s) IV Intermittent every 24 hours  enoxaparin Injectable 40 milliGRAM(s) SubCutaneous every 24 hours  lactated ringers. 1000 milliLiter(s) (75 mL/Hr) IV Continuous <Continuous>  lactated ringers. 1000 milliLiter(s) (500 mL/Hr) IV Continuous <Continuous>  pantoprazole    Tablet 40 milliGRAM(s) Oral before breakfast    Home:    Vitals:  T(C): 36.2, Max: 38.2 (07-27-23 @ 00:00)  T(F): 97.2, Max: 100.8 (07-27-23 @ 00:00)  HR: 102 (102 - 113)  BP: 109/67 (109/67 - 142/84)  RR: 18 (18 - 18)  SpO2: 97% (97% - 97%)    Physical Exam:  General: Awake, lethargic, acute stress secondary to c/o difficulty breathing   Heart: Regular rate and rhythm;   Lungs: Clear, decreased breath sounds bilaterally  Abdomen: Soft, nondistended, mild tenderness to epigastric region   Extremities: No edema in upper or lower extremities.  Neuro: AAOx3, No focal deficits.    Labs:  CBC (07-27 @ 05:36)                        Hgb: 12.8   WBC: 8.46  )-----------------( Plts: 109                              Hct: 38.5     Chem (07-27 @ 05:36)  Na: 134  |     Cl: 98     |  BUN: 8   -----------------------------------------< Gluc: 145    K: 3.9   |    HCO3: 25  |  Cr: 0.6    Ca 8.1 (07-27 @ 05:36)  Mg 1.6 (07-27 @ 05:36)    LFTs (07-27 @ 05:36)  TPro 5.9  /  Alb 3.2  TBili 0.7  /  DBili       /    /  AlkPhos 314    Cardiac Markers (07-26 @ 21:57)  Lactate 1.4  ESR 10      Microbiology:  Culture - Urine (collected 07-22 @ 18:25)  Source: Clean Catch Clean Catch (Midstream)  Final Report (07-24 @ 16:22):    >=3 organisms. Probable collection contamination.      RADIOLOGY, ECG, & ADDITIONAL TESTS:  12 Lead ECG:   Ventricular Rate 82 BPM    Atrial Rate 82 BPM    P-R Interval 140 ms    QRS Duration 120 ms    Q-T Interval 410 ms    QTC Calculation(Bazett) 479 ms    P Axis 16 degrees    R Axis 3 degrees    T Axis 5 degrees    Diagnosis Line Normal sinus rhythm  Right bundle branch block  Abnormal ECG    Confirmed by ROBERT VANEGAS MD ([id number]) on 7/26/2023 6:55:38 AM (07-26-23 @ 01:59)    CT Head No Cont:   ACC: [id number] EXAM:  CT BRAIN   ORDERED BY: JAIRO ADAMS     PROCEDURE DATE:  07/22/2023          INTERPRETATION:  CLINICAL INDICATION: Headache    Technique: CT of the head was performed without contrast.    Multiple contiguous axial images were acquired from the skullbase to the   vertex without the administration of intravenous contrast.  Coronal and   sagittal reformations were made.    COMPARISON: None    FINDINGS:    The ventricles and sulci are unremarkable in appearance.     There isno intraparenchymal hematoma, mass effect or midline shift. No   abnormal extra-axial fluid collections are present.    The calvarium is intact. The visualized intraorbital compartments,   paranasal sinuses and mastoid complexes appear free of acute disease.    IMPRESSION:    No acute intracranial pathology. No evidence of midline shift, mass   effect or intracranial hemorrhage.    --- End of Report ---

## 2023-07-27 NOTE — PROGRESS NOTE ADULT - ASSESSMENT
46 y/o female w/ PMHx of postcholecystectomy and UTI presenting with fever, body aches, cough, vomiting, headache and SOB for 5 days and admitted for transaminitis and fever.      46 y/o female w/ PMHx of postcholecystectomy and UTI presenting with fever, body aches, cough, vomiting, headache and SOB for 5 days and admitted for transaminitis and fever.       #Transaminitis  #Fever   #Acetaminophen induced injury possible vs. tick born illness vs. other infectious agent  -Pt reports taking tylenol until 7/22 and then switched to ibuprofen: -Normal acetaminophen level  -ALT: 425---> 366---> 376  -AST:282--->221---> 235  -Monitoring LFTs daily  -RUQ US unremarkable   -Hepatitis Panel negative   -Lipase: Negative  -Hepatology consult requested, following recommendations , follow lab work recommended by hepatology   -Continues on Rocephin and Doxy added (7/27/23)  -Monitor for fever spikes                -7/27/23: Pt had a Fever of T100.1 and c/o SOB and epigastric burning pain                                   -Placed on 2L O2 vis NC. Stating well at 96-98% on O2                                   -STAT EKG done: Sinus Tachy with RBBB                                   -Chest x-ray done: No radiographic evidence of acute cardiopulmonary disease.                                   -Pepto bismol x1 and Pepcid x1                                    -Tylenol 650mg x 1 dose                                   -STAT labs: CBC, CMP, Lipase and Troponin  -CT chest and CT Abdomen: Will F/U  -Tick panel ordered- Will F/U      #Sepsis unknown origin   #Headache - r/o temporal arteritis?   #Leukopenia, resolved   -Pt with hx or tension headaches  -No nuchal rigidity, no photophobia   -Head CT 7/22 negative  -ESR 10 and CRP 51.4   -CXR: Low lung volumes with left basilar opacity/effusion.  -RVP, COVID, Infectious Mono- Negative  -Lactate normal   -Blood Cxs: Pending, Procal: Pending   -Ibuprofen for headache, consider neuro consult if headache is not improving  -West Nile antibodies ordered: Pending  -Tick panel ordered - babesiosis could be consistent with lower wbc count, fever, and transaminitis    -Hepatology recommendations followed   -ID consult placed for fever of unknown etiology

## 2023-07-27 NOTE — PROGRESS NOTE ADULT - SUBJECTIVE AND OBJECTIVE BOX
ULYSSESCAMILA SAMPSON  47y, Female  Allergy: No Known Allergies    Hospital Day: 1d    Patient seen and examined earlier today. family at bedside  patient reported symptoms present for last one week. no recent travel.   was feeling feverish and chills some intermittent abdominal pain.  during my interview patient does not report any specific symptoms .   still having fever spikes     PMH/PSH:  PAST MEDICAL & SURGICAL HISTORY:  Pyelitis      Urinary tract infection      History of cholecystectomy          LAST 24-Hr EVENTS:    VITALS:  T(F): 100.1 (23 @ 12:22), Max: 100.8 (23 @ 00:00)  HR: 109 (23 @ 12:22)  BP: 115/56 (23 @ 12:22) (109/67 - 142/84)  RR: 18 (23 @ 12:23)  SpO2: 96% (23 @ 12:23)          TESTS & MEASUREMENTS:  Weight/BMI  68 (23 @ 23:46)  30.3 (23 @ 12:20)                          12.8   8.46  )-----------( 109      ( 2023 05:36 )             38.5             134<L>  |  98  |  8<L>  ----------------------------<  145<H>  3.9   |  25  |  0.6<L>    Ca    8.1<L>      2023 05:36  Mg     1.6         TPro  5.9<L>  /  Alb  3.2<L>  /  TBili  0.7  /  DBili  x   /  AST  235<H>  /  ALT  376<H>  /  AlkPhos  314<H>      LIVER FUNCTIONS - ( 2023 05:36 )  Alb: 3.2 g/dL / Pro: 5.9 g/dL / ALK PHOS: 314 U/L / ALT: 376 U/L / AST: 235 U/L / GGT: x                 Culture - Urine (collected 23 @ 18:25)  Source: Clean Catch Clean Catch (Midstream)  Final Report (23 @ 16:22):    >=3 organisms. Probable collection contamination.      Urinalysis Basic - ( 2023 05:36 )    Color: x / Appearance: x / SG: x / pH: x  Gluc: 145 mg/dL / Ketone: x  / Bili: x / Urobili: x   Blood: x / Protein: x / Nitrite: x   Leuk Esterase: x / RBC: x / WBC x   Sq Epi: x / Non Sq Epi: x / Bacteria: x                            RADIOLOGY, ECG, & ADDITIONAL TESTS:  12 Lead ECG:   Ventricular Rate 82 BPM    Atrial Rate 82 BPM    P-R Interval 140 ms    QRS Duration 120 ms    Q-T Interval 410 ms    QTC Calculation(Bazett) 479 ms    P Axis 16 degrees    R Axis 3 degrees    T Axis 5 degrees    Diagnosis Line Normal sinus rhythm  Right bundle branch block  Abnormal ECG    Confirmed by ROBERT VANEGAS MD ([id number]) on 2023 6:55:38 AM (23 @ 01:59)    CT Head No Cont:   ACC: [id number] EXAM:  CT BRAIN   ORDERED BY: JAIRO ADAMS     PROCEDURE DATE:  2023          INTERPRETATION:  CLINICAL INDICATION: Headache    Technique: CT of the head was performed without contrast.    Multiple contiguous axial images were acquired from the skullbase to the   vertex without the administration of intravenous contrast.  Coronal and   sagittal reformations were made.    COMPARISON: None    FINDINGS:    The ventricles and sulci are unremarkable in appearance.     There isno intraparenchymal hematoma, mass effect or midline shift. No   abnormal extra-axial fluid collections are present.    The calvarium is intact. The visualized intraorbital compartments,   paranasal sinuses and mastoid complexes appear free of acute disease.    IMPRESSION:    No acute intracranial pathology. No evidence of midline shift, mass   effect or intracranial hemorrhage.    --- End of Report ---            CLAU FOSTER MD; Attending Radiologist  This document has been electronically signed. 2023  5:17PM (23 @ 17:15)    RECENT DIAGNOSTIC ORDERS:  Xray Chest 1 View- PORTABLE-Urgent: Urgent   Indication: hypoxic  Transport: Portable (23 @ 12:32)  Comprehensive Metabolic Panel: AM Sched. Collection: 2023 04:30 (23 @ 12:28)  Magnesium: AM Sched. Collection: 2023 04:30 (23 @ 12:28)  Complete Blood Count + Automated Diff: AM Sched. Collection: 2023 04:30 (23 @ 12:28)  HSV II Antibody Ig:00 (23 @ 11:44)  HSV I Antibody Ig:00 (23 @ 11:44)  Emilia-Barr Virus Serologic Test: 16:00 (23 @ 11:44)  Cytomegalovirus By PCR: 16:00 (23 @ 11:44)  Hepatitis B Surface Antibody: 16:00 (23 @ 11:44)  Hepatitis B Surface Antigen: 16:00 (23 @ 11:44)  Hepatitis B Core Antibody, Total: 16:00 (23 @ 11:44)  Hepatitis B Core IgM Antibody: 16:00 (23 @ 11:44)  Hepatitis A IgM Antibody: 16:00 (23 @ 11:44)  Tick-Borne Disease Antibodies Panel, Serum: 20:00 (23 @ 18:09)  West Nile Virus IgG/IgM Antibody, Serum: 20:00 (23 @ 18:07)  Procalcitonin, Serum: 20:00 (23 @ 16:08)  Culture - Blood: 20:00  Specimen Source: Blood (23 @ 16:08)  Diet, DASH/TLC:   Sodium & Cholesterol Restricted (23 @ 15:29)      MEDICATIONS:  MEDICATIONS  (STANDING):  cefTRIAXone   IVPB 2000 milliGRAM(s) IV Intermittent every 24 hours  enoxaparin Injectable 40 milliGRAM(s) SubCutaneous every 24 hours  lactated ringers. 1000 milliLiter(s) (75 mL/Hr) IV Continuous <Continuous>  pantoprazole    Tablet 40 milliGRAM(s) Oral before breakfast    MEDICATIONS  (PRN):  ibuprofen  Tablet. 600 milliGRAM(s) Oral every 6 hours PRN Temp greater or equal to 38C (100.4F), Moderate Pain (4 - 6)      HOME MEDICATIONS:      PHYSICAL EXAM:  GENERAL: Patient lying on bed, comfortable   CHEST/LUNG: NVB, no wheezing   HEART: R1+R2, RRR  ABDOMEN: Soft. non tender, BS positive  EXTREMITIES:  no edema, Bruising  CNS: AAAx4. No cranial nerves deficit.

## 2023-07-27 NOTE — CONSULT NOTE ADULT - ASSESSMENT
ASSESSMENT  48 y/o F w/ pmhx pf cholecystectomy who presents with fever, sweats, chills, body aches, dry cough, headache, shortness of breath, and one episode of vomiting for 5 days.    IMPRESSION  #Fever  #Headache    #Transaminitis  - RUQ 7/26 - WNL, s/p cholecystectomy  - Acute Hepatitis serologies negative  - Monospot negative    #Leukopenia    RECOMMENDATIONS  - Tick born antibodies collected -- send for Babesia PCR, Anaplasma/Ehrlichiosis PCR, Lyme C6 vise  - start doxycycline 100 mg BID in meantime for empiric Anaplasma sypmtoms given predominant nausea/vomiting/headaches that are associated with this   - check leptospirosis -ab  - check EBV serologies/ CMV serologies  - screen HIV  - continue ceftriaxone until blood cx results    Please call or message on [employer] Teams if with any questions.  Spectra [phone]   ASSESSMENT  48 y/o F w/ pmhx pf cholecystectomy who presents with fever, sweats, chills, body aches, dry cough, headache, shortness of breath, and one episode of vomiting for 5 days.    IMPRESSION  #Fever  #Headache    #Transaminitis  - RUQ 7/26 - WNL, s/p cholecystectomy  - Acute Hepatitis serologies negative  - Monospot negative    #Leukopenia    RECOMMENDATIONS  - Tick born antibodies collected -- send for Babesia PCR, Anaplasma/Ehrlichiosis PCR, Lyme C6 vise (ordered for AM labs tomorrow)  - start doxycycline 100 mg BID in meantime for empiric Anaplasma given predominant nausea/vomiting/headaches that are associated with this   - check leptospirosis -ab (ordered)  - check EBV serologies/ CMV serologies   - screen HIV (ordered for AM labs tomorrow)   - continue ceftriaxone until blood cx results    Please call or message on [employer] Teams if with any questions.  Spectra [phone]

## 2023-07-27 NOTE — CONSULT NOTE ADULT - ATTENDING COMMENTS
47 y o  F admitted with fever headache and has thrombocytopenia, lymphopenia and elevated liver tests.  No hx of travel recently. No tick bite or out door activity other than being gardening for past few weeks.   Alert  Not icteric  Abdomen patulous soft non tender  Acute febrile illness with thrombocytopenia, lymphopenia and elevated liver tests.  Could be viral or parasitic.  Hepatitis A B C negative  ID input and antibiotic coverage for tick borne illness  Would test for EBV CMV Herpes anaplasma babesia and Lyme' s.

## 2023-07-27 NOTE — CONSULT NOTE ADULT - ASSESSMENT
46 y/o F w/ pmhx pf cholecystectomy who presents with fever, sweats, chills, body aches, dry cough, headache, shortness of breath, and one episode of vomiting. sypm started on last Friday with acute onset severe headache, she received 3 injections? 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 symp continues and progressed, she used couple of extra strength Tylenol and Motrin for 2 days. presented to ER. hepatology is called for evaluation.     # Elevated LFTs: transaminitis with elevated ALP and normal bilirubin: DDx systemic infectious process vs others  - patient is hemogenically stable but spiking fever  - presented with leucopenia and thrombocytopenia   - LFTs noted  - acute hepatitis panel is negative, RVP is negative, Infectious mononucleosis screen is negative  - US: normal liver, post CCY, cbd 5 mm    recommendations:  - Please send Hepatitis A IgM, Hepatitis B core IgM, core Ab total, surface Ag, surface Ab, HCV antibody,   - Please send CMV PCR, EBV PCR, HSV IgM  - Please avoid hepatotoxic medications  - Monitor LFTs daily  - on rocephin, recommend adding dxy  - ID eval

## 2023-07-28 LAB
ALBUMIN SERPL ELPH-MCNC: 2.8 G/DL — LOW (ref 3.5–5.2)
ALP SERPL-CCNC: 239 U/L — HIGH (ref 30–115)
ALT FLD-CCNC: 251 U/L — HIGH (ref 0–41)
ANION GAP SERPL CALC-SCNC: 10 MMOL/L — SIGNIFICANT CHANGE UP (ref 7–14)
AST SERPL-CCNC: 100 U/L — HIGH (ref 0–41)
BABESIA MICROTI PCR, BLD RESULT: SIGNIFICANT CHANGE UP
BASOPHILS # BLD AUTO: 0 K/UL — SIGNIFICANT CHANGE UP (ref 0–0.2)
BASOPHILS NFR BLD AUTO: 0 % — SIGNIFICANT CHANGE UP (ref 0–1)
BILIRUB SERPL-MCNC: 0.5 MG/DL — SIGNIFICANT CHANGE UP (ref 0.2–1.2)
BUN SERPL-MCNC: 9 MG/DL — LOW (ref 10–20)
CALCIUM SERPL-MCNC: 7.8 MG/DL — LOW (ref 8.4–10.5)
CHLORIDE SERPL-SCNC: 100 MMOL/L — SIGNIFICANT CHANGE UP (ref 98–110)
CMV DNA CSF QL NAA+PROBE: SIGNIFICANT CHANGE UP IU/ML
CMV DNA SPEC NAA+PROBE-LOG#: SIGNIFICANT CHANGE UP LOG10IU/ML
CO2 SERPL-SCNC: 27 MMOL/L — SIGNIFICANT CHANGE UP (ref 17–32)
CREAT ?TM UR-MCNC: 193 MG/DL — SIGNIFICANT CHANGE UP
CREAT SERPL-MCNC: 0.6 MG/DL — LOW (ref 0.7–1.5)
EBV EA AB SER IA-ACNC: <5 U/ML — SIGNIFICANT CHANGE UP
EBV EA AB TITR SER IF: POSITIVE
EBV EA IGG SER-ACNC: NEGATIVE — SIGNIFICANT CHANGE UP
EBV NA IGG SER IA-ACNC: >600 U/ML — HIGH
EBV PATRN SPEC IB-IMP: SIGNIFICANT CHANGE UP
EBV VCA IGG AVIDITY SER QL IA: POSITIVE
EBV VCA IGM SER IA-ACNC: 170 U/ML — HIGH
EBV VCA IGM SER IA-ACNC: <10 U/ML — SIGNIFICANT CHANGE UP
EBV VCA IGM TITR FLD: NEGATIVE — SIGNIFICANT CHANGE UP
EGFR: 111 ML/MIN/1.73M2 — SIGNIFICANT CHANGE UP
EOSINOPHIL # BLD AUTO: 0 K/UL — SIGNIFICANT CHANGE UP (ref 0–0.7)
EOSINOPHIL NFR BLD AUTO: 0 % — SIGNIFICANT CHANGE UP (ref 0–8)
GLUCOSE SERPL-MCNC: 113 MG/DL — HIGH (ref 70–99)
HAV IGM SER-ACNC: SIGNIFICANT CHANGE UP
HBV CORE AB SER-ACNC: SIGNIFICANT CHANGE UP
HBV CORE IGM SER-ACNC: SIGNIFICANT CHANGE UP
HBV SURFACE AB SER-ACNC: REACTIVE
HBV SURFACE AG SER-ACNC: SIGNIFICANT CHANGE UP
HCT VFR BLD CALC: 34.4 % — LOW (ref 37–47)
HEPARIN-PF4 AB RESULT: <0.6 U/ML — SIGNIFICANT CHANGE UP (ref 0–0.9)
HGB BLD-MCNC: 11.7 G/DL — LOW (ref 12–16)
HIV 1+2 AB+HIV1 P24 AG SERPL QL IA: SIGNIFICANT CHANGE UP
HSV1 IGG SER-ACNC: 42.7 INDEX — HIGH
HSV1 IGG SERPL QL IA: POSITIVE
HSV2 IGG FLD-ACNC: 0.08 INDEX — SIGNIFICANT CHANGE UP
HSV2 IGG SERPL QL IA: NEGATIVE — SIGNIFICANT CHANGE UP
LDH SERPL L TO P-CCNC: 352 — HIGH (ref 50–242)
LYMPHOCYTES # BLD AUTO: 0.51 K/UL — LOW (ref 1.2–3.4)
LYMPHOCYTES # BLD AUTO: 4.4 % — LOW (ref 20.5–51.1)
MAGNESIUM SERPL-MCNC: 2.3 MG/DL — SIGNIFICANT CHANGE UP (ref 1.8–2.4)
MCHC RBC-ENTMCNC: 30.9 PG — SIGNIFICANT CHANGE UP (ref 27–31)
MCHC RBC-ENTMCNC: 34 G/DL — SIGNIFICANT CHANGE UP (ref 32–37)
MCV RBC AUTO: 90.8 FL — SIGNIFICANT CHANGE UP (ref 81–99)
MONOCYTES # BLD AUTO: 0.2 K/UL — SIGNIFICANT CHANGE UP (ref 0.1–0.6)
MONOCYTES NFR BLD AUTO: 1.7 % — SIGNIFICANT CHANGE UP (ref 1.7–9.3)
NEUTROPHILS # BLD AUTO: 10.51 K/UL — HIGH (ref 1.4–6.5)
NEUTROPHILS NFR BLD AUTO: 80.9 % — HIGH (ref 42.2–75.2)
PF4 HEPARIN CMPLX AB SER-ACNC: NEGATIVE — SIGNIFICANT CHANGE UP
PLATELET # BLD AUTO: 106 K/UL — LOW (ref 130–400)
PMV BLD: 11.2 FL — HIGH (ref 7.4–10.4)
POTASSIUM SERPL-MCNC: 3.7 MMOL/L — SIGNIFICANT CHANGE UP (ref 3.5–5)
POTASSIUM SERPL-SCNC: 3.7 MMOL/L — SIGNIFICANT CHANGE UP (ref 3.5–5)
PROT SERPL-MCNC: 5.4 G/DL — LOW (ref 6–8)
PROT/CREAT UR-RTO: 0.3 RATIO — HIGH (ref 0–0.2)
RBC # BLD: 3.79 M/UL — LOW (ref 4.2–5.4)
RBC # BLD: 3.83 M/UL — LOW (ref 4.2–5.4)
RBC # FLD: 16.4 % — HIGH (ref 11.5–14.5)
RETICS #: 42.1 K/UL — SIGNIFICANT CHANGE UP (ref 25–125)
RETICS/RBC NFR: 1.1 % — SIGNIFICANT CHANGE UP (ref 0.5–1.5)
SODIUM SERPL-SCNC: 137 MMOL/L — SIGNIFICANT CHANGE UP (ref 135–146)
WBC # BLD: 11.51 K/UL — HIGH (ref 4.8–10.8)
WBC # FLD AUTO: 11.51 K/UL — HIGH (ref 4.8–10.8)

## 2023-07-28 PROCEDURE — 99233 SBSQ HOSP IP/OBS HIGH 50: CPT

## 2023-07-28 RX ORDER — ACETAMINOPHEN 500 MG
650 TABLET ORAL EVERY 6 HOURS
Refills: 0 | Status: DISCONTINUED | OUTPATIENT
Start: 2023-07-28 | End: 2023-08-01

## 2023-07-28 RX ORDER — AZITHROMYCIN 500 MG/1
500 TABLET, FILM COATED ORAL DAILY
Refills: 0 | Status: DISCONTINUED | OUTPATIENT
Start: 2023-07-28 | End: 2023-07-28

## 2023-07-28 RX ORDER — AZITHROMYCIN 500 MG/1
500 TABLET, FILM COATED ORAL EVERY 24 HOURS
Refills: 0 | Status: DISCONTINUED | OUTPATIENT
Start: 2023-07-28 | End: 2023-07-30

## 2023-07-28 RX ORDER — IBUPROFEN 200 MG
400 TABLET ORAL ONCE
Refills: 0 | Status: COMPLETED | OUTPATIENT
Start: 2023-07-28 | End: 2023-07-28

## 2023-07-28 RX ORDER — SODIUM CHLORIDE 9 MG/ML
1000 INJECTION INTRAMUSCULAR; INTRAVENOUS; SUBCUTANEOUS
Refills: 0 | Status: DISCONTINUED | OUTPATIENT
Start: 2023-07-28 | End: 2023-07-29

## 2023-07-28 RX ORDER — ATOVAQUONE 750 MG/5ML
750 SUSPENSION ORAL
Refills: 0 | Status: DISCONTINUED | OUTPATIENT
Start: 2023-07-28 | End: 2023-07-30

## 2023-07-28 RX ADMIN — PANTOPRAZOLE SODIUM 40 MILLIGRAM(S): 20 TABLET, DELAYED RELEASE ORAL at 05:09

## 2023-07-28 RX ADMIN — Medication 400 MILLIGRAM(S): at 03:42

## 2023-07-28 RX ADMIN — ATOVAQUONE 750 MILLIGRAM(S): 750 SUSPENSION ORAL at 13:27

## 2023-07-28 RX ADMIN — ENOXAPARIN SODIUM 40 MILLIGRAM(S): 100 INJECTION SUBCUTANEOUS at 05:09

## 2023-07-28 RX ADMIN — Medication 650 MILLIGRAM(S): at 17:32

## 2023-07-28 RX ADMIN — Medication 400 MILLIGRAM(S): at 02:28

## 2023-07-28 RX ADMIN — Medication 100 MILLIGRAM(S): at 05:09

## 2023-07-28 RX ADMIN — Medication 100 MILLIGRAM(S): at 17:32

## 2023-07-28 RX ADMIN — SODIUM CHLORIDE 100 MILLILITER(S): 9 INJECTION INTRAMUSCULAR; INTRAVENOUS; SUBCUTANEOUS at 13:27

## 2023-07-28 RX ADMIN — AZITHROMYCIN 255 MILLIGRAM(S): 500 TABLET, FILM COATED ORAL at 13:27

## 2023-07-28 RX ADMIN — SODIUM CHLORIDE 100 MILLILITER(S): 9 INJECTION INTRAMUSCULAR; INTRAVENOUS; SUBCUTANEOUS at 02:15

## 2023-07-28 NOTE — PROGRESS NOTE ADULT - ASSESSMENT
48 y/o female w/ PMHx of postcholecystectomy and UTI presenting with fever, body aches, cough, vomiting, headache and SOB for 5 days and admitted for transaminitis and fever.       #Transaminitis  #Fever   #Acetaminophen induced injury possible vs. tick born illness vs. other infectious agent  -Pt reports taking tylenol until 7/22 and then switched to ibuprofen: -Normal acetaminophen level  -ALT: 425---> 366---> 376--->251  -AST:282--->221---> 235--->100  -Monitoring LFTs daily  -RUQ US unremarkable   -Hepatitis Panel negative   -Lipase: Negative  -Hepatology consult requested, following recommendations , follow lab work recommended by hepatology   -Continues on Rocephin and Doxy added (7/27/23)  -Monitor for fever spikes                -7/27/23: Pt had a Fever of T100.1 and c/o SOB and epigastric burning pain                                   -Placed on 2L O2 vis NC. Stating well at 96-98% on O2                                   -STAT EKG done: Sinus Tachy with RBBB                                   -Chest x-ray done: No radiographic evidence of acute cardiopulmonary disease.                                   -Pepto bismol x1 and Pepcid x1                                    -Tylenol 650mg x 1 dose                                   -STAT labs: CBC, CMP, Lipase(Negative) and Troponin(negative)  -CT Abdomen/Pelvis(7/27): No acute intra-abdominal or pelvic pathology.  -CT Chest(7/27):   -Tick panel ordered- Awaiting results  -Hemolytic Panel ordered (7/28/23): LDH, Haptoglobin ad Reticulocyte count   -Tylenol PRN started  -Legionella Urine antigen ordered(7/28/23)   -Started on Azithromycin(7/28/23)      #Sepsis unknown origin   #Headache - r/o temporal arteritis?   #Leukopenia, resolved   -Pt with hx or tension headaches  -No nuchal rigidity, no photophobia   -Head CT 7/22 negative  -ESR 10 and CRP 51.4   -CXR: Low lung volumes with left basilar opacity/effusion.  -RVP, COVID, Infectious Mono- Negative  -Lactate normal   -Blood Cxs: No growth at 24hrs, Procal: 0.35  -Ibuprofen for headache, consider neuro consult if headache is not improving  -West Nile antibodies ordered: Pending  -Tick panel ordered- Pending  -Hepatology recommendations followed-Pending results  -ID consult placed for fever of unknown etiology     46 y/o female w/ PMHx of postcholecystectomy and UTI presenting with fever, body aches, cough, vomiting, headache and SOB for 5 days and admitted for transaminitis and fever.       #Transaminitis-Improving   #Fever   #Acetaminophen induced injury possible vs. tick born illness vs. other infectious agent  -Pt reports taking tylenol until 7/22 and then switched to ibuprofen: -Normal acetaminophen level  -ALT: 425---> 366---> 376--->251  -AST:282--->221---> 235--->100  -Monitoring LFTs daily  -RUQ US unremarkable   -Hepatitis Panel negative   -Lipase: Negative  -Hepatology consult requested, following recommendations , follow lab work recommended by hepatology   -Continues on Rocephin and Doxy added (7/27/23)  -Monitor for fever spikes                -7/27/23: Pt had a Fever of T100.1 and c/o SOB and epigastric burning pain                                   -Placed on 2L O2 vis NC. Stating well at 96-98% on O2                                   -STAT EKG done: Sinus Tachy with RBBB                                   -Chest x-ray done: No radiographic evidence of acute cardiopulmonary disease.                                   -Pepto bismol x1 and Pepcid x1                                    -Tylenol 650mg x 1 dose                                   -STAT labs: CBC, CMP, Lipase(Negative) and Troponin(negative)  -CT Abdomen/Pelvis(7/27): No acute intra-abdominal or pelvic pathology.  -CT Chest(7/27):  Atelectasis scarring in right lower lobe. Bibasilar atelectasis (most pronounced left lower lobe). 3 mm solid nodule in the left upper lobe (6-17).  -Tick panel ordered- Awaiting results  -Hemolytic Panel ordered (7/28/23): LDH, Haptoglobin ad Reticulocyte count   -Tylenol PRN started  -Legionella Urine antigen ordered(7/28/23)   -Started on Azithromycin(7/28/23)      #Hyponatremia-Improving  -Na+ 128-->132--137  -NS 100ml/hr started      #Sepsis unknown origin   #Headache - r/o temporal arteritis?   #Leukopenia, resolved   -Pt with hx or tension headaches  -No nuchal rigidity, no photophobia   -Head CT 7/22 negative  -ESR 10 and CRP 51.4   -CXR: Low lung volumes with left basilar opacity/effusion.  -RVP, COVID, Infectious Mono- Negative  -Lactate normal   -Blood Cxs: No growth at 24hrs, Procal: 0.35  -Ibuprofen for headache, consider neuro consult if headache is not improving  -West Nile antibodies ordered: Pending  -Tick panel ordered- Pending  -Hepatology recommendations followed-Pending results  -ID consult placed for fever of unknown etiology

## 2023-07-28 NOTE — PROGRESS NOTE ADULT - ASSESSMENT
46 y/o F w/ pmhx pf cholecystectomy who presents with fever, sweats, chills, body aches, dry cough, headache, shortness of breath, and one episode of vomiting. sypm started on last Friday with acute onset severe headache, she received 3 injections? 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 symp continues and progressed, she used couple of extra strength Tylenol and Motrin for 2 days. presented to ER. hepatology is called for evaluation.     # Elevated LFTs: transaminitis with elevated ALP and normal bilirubin: DDx systemic infectious process ( tick born vs others):   - patient is hemogenically stable but spiking fever, tmax over night is 101.9   - presented with leucopenia and thrombocytopenia   - LFTs noted: trending down   - acute hepatitis panel is negative, RVP is negative, Infectious mononucleosis screen is negative  - US: normal liver, post CCY, cbd 5 mm  - ID input appreciated , started on docxy , c/w rocephin     recommendations:  - follow up viral serology and work up recommended bu ID   - Please avoid hepatotoxic medications  - Monitor LFTs daily  - supportive measures per primary team

## 2023-07-28 NOTE — PROGRESS NOTE ADULT - ATTENDING COMMENTS
47 y o  F admitted with fever headache and has thrombocytopenia, lymphopenia and elevated liver tests.  No hx of travel recently. No tick bite or out door activity other than being gardening for past few weeks.   Feels a little better. Fever improved and headache a little better. No abdominal pain.   Alert  Not icteric  Abdomen patulous soft non tender  Acute febrile illness with thrombocytopenia, lymphopenia and elevated liver tests.  Could be viral or parasitic.  Hepatitis A B C negative  Note ID input and on antibiotic coverage for tick borne illness  Test results for EBV CMV Herpes anaplasma babesia and Lyme's pending.   CT abdomen no hepatobiliary pathology

## 2023-07-28 NOTE — PROGRESS NOTE ADULT - SUBJECTIVE AND OBJECTIVE BOX
Gastroenterology progress note:     Patient is a 47y old  Female who presents with a chief complaint of Fever and Transaminitis (28 Jul 2023 09:33)       Admitted on: 07-26-23    We are following the patient for: elevated LFTs        Interval History:    No acute events overnight.   feels slightly better  Tmax 101.9      PAST MEDICAL & SURGICAL HISTORY:  Pyelitis      Urinary tract infection      History of cholecystectomy          MEDICATIONS  (STANDING):  azithromycin   Tablet 500 milliGRAM(s) Oral daily  bismuth subsalicylate Liquid 525 milliGRAM(s) Oral once  cefTRIAXone   IVPB 2000 milliGRAM(s) IV Intermittent every 24 hours  diatrizoate meglumine/diatrizoate sodium. 30 milliLiter(s) Oral once  doxycycline IVPB 100 milliGRAM(s) IV Intermittent two times a day  enoxaparin Injectable 40 milliGRAM(s) SubCutaneous every 24 hours  pantoprazole    Tablet 40 milliGRAM(s) Oral before breakfast  sodium chloride 0.9%. 1000 milliLiter(s) (100 mL/Hr) IV Continuous <Continuous>    MEDICATIONS  (PRN):  acetaminophen     Tablet .. 650 milliGRAM(s) Oral every 6 hours PRN Temp greater or equal to 38C (100.4F), Moderate Pain (4 - 6)      Allergies  No Known Allergies      Review of Systems:   Cardiovascular:  No Chest Pain, No Palpitations  Respiratory:  No Cough, No Dyspnea  Gastrointestinal:  As described in HPI  Skin:  No Skin Lesions, No Jaundice  Neuro:  No Syncope, No Dizziness    Physical Examination:  T(C): 36.4 (07-28-23 @ 07:44), Max: 38.8 (07-28-23 @ 01:51)  HR: 78 (07-28-23 @ 07:44) (78 - 112)  BP: 104/57 (07-28-23 @ 07:44) (104/57 - 145/72)  RR: 18 (07-28-23 @ 07:44) (18 - 22)  SpO2: 99% (07-28-23 @ 07:44) (92% - 99%)      07-27-23 @ 07:01  -  07-28-23 @ 07:00  --------------------------------------------------------  IN: 0 mL / OUT: 200 mL / NET: -200 mL    07-28-23 @ 07:01  -  07-28-23 @ 11:13  --------------------------------------------------------  IN: 360 mL / OUT: 0 mL / NET: 360 mL        GENERAL: AAOx3, no acute distress.  HEAD:  Atraumatic, Normocephalic  EYES: conjunctiva and sclera clear  NECK: Supple, no JVD or thyromegaly  CHEST/LUNG: Clear to auscultation bilaterally; No wheeze, rhonchi, or rales  HEART: Regular rate and rhythm; normal S1, S2, No murmurs.  ABDOMEN: Soft, nontender, nondistended; Bowel sounds present  NEUROLOGY: No asterixis or tremor.   SKIN: Intact, no jaundice     Data:                        11.7   11.51 )-----------( 106 ( 28 Jul 2023 07:37 )             34.4     Hgb trend:  11.7  07-28-23 @ 07:37  12.1  07-27-23 @ 13:33  12.8  07-27-23 @ 05:36  13.1  07-26-23 @ 01:12        07-28    137  |  100  |  9<L>  ----------------------------<  113<H>  3.7   |  27  |  0.6<L>    Ca    7.8<L>      28 Jul 2023 07:37  Mg     2.3     07-28    TPro  5.4<L>  /  Alb  2.8<L>  /  TBili  0.5  /  DBili  x   /  AST  100<H>  /  ALT  251<H>  /  AlkPhos  239<H>  07-28    Liver panel trend:  TBili 0.5   /      /      /   AlkP 239   /   Tptn 5.4   /   Alb 2.8    /   DBili --      07-28  TBili 0.6   /      /      /   AlkP 287   /   Tptn 5.6   /   Alb 3.0    /   DBili --      07-27  TBili 0.7   /      /      /   AlkP 314   /   Tptn 5.9   /   Alb 3.2    /   DBili --      07-27  TBili 0.8   /      /      /   AlkP 280   /   Tptn 5.7   /   Alb 3.1    /   DBili 0.5      07-26  TBili 0.7   /      /      /   AlkP 348   /   Tptn 6.7   /   Alb 3.6    /   DBili --      07-26  TBili 0.4   /      /      /   AlkP 169   /   Tptn 6.4   /   Alb 3.7    /   DBili --      07-22      PT/INR - ( 27 Jul 2023 13:33 )   PT: 11.90 sec;   INR: 1.04 ratio         PTT - ( 27 Jul 2023 13:33 )  PTT:44.8 sec    Culture - Blood (collected 26 Jul 2023 21:57)  Source: .Blood None  Preliminary Report (28 Jul 2023 02:02):    No growth at 24 hours         Radiology:  CT Abdomen and Pelvis w/ Oral Cont and w/wo IV Cont:   ACC: [id number] EXAM:  CT ABDOMEN AND PELVIS [facility]   ORDERED BY: PETAR MÉNDEZ     PROCEDURE DATE:  07/27/2023          INTERPRETATION:  CLINICAL HISTORY / REASON FOR EXAM: Transaminitis, sepsis    TECHNIQUE: Contiguous axial CT images were obtained from the lower chest   to the pubic symphysis without contrast and following the administration   of 100 mL Omnipaque 350 intravenous contrast . Oral contrast was   administered. Coronal and sagittal reformats were submitted for review.    COMPARISON CT: CT abdomen pelvis 9/4/2021    FINDINGS:    LOWER CHEST: Please see separately dictated report for intrathoracic   findings.    HEPATOBILIARY: Postcholecystectomy. No intrahepatic or extrahepatic   biliary ductal dilation.    SPLEEN: Splenule is noted. Otherwise unremarkable..    PANCREAS: Unremarkable.    ADRENAL GLANDS: Unremarkable.    KIDNEYS: Symmetric renal enhancement. No hydronephrosis..    ABDOMINOPELVIC NODES: No enlarged abdominal or pelvic lymph nodes.    PELVIC ORGANS: Enlarged bulky uterus, unchanged. Under distended bladder.    PERITONEUM/MESENTERY/BOWEL: No bowel obstruction, pneumoperitoneum or   ascites. Normal caliber appendix.    BONES/SOFT TISSUES: Unchanged sclerosis of the bilateral sacroiliac   joints. Degenerativechanges of the spine..    VASCULAR: The abdominal aorta is of normal caliber..      IMPRESSION:    No acute intra-abdominal or pelvic pathology..    Please see separately dictated report CT chest performed concurrently for   intrathoracic findings.    --- End of Report ---          SERENE LOMAS MD; Resident Radiologist  This document has been electronically signed.  ELLIOT LANDAU MD; Attending Radiologist  This document has been electronically signed. Jul 28 2023  9:56AM (07-27-23 @ 22:55)

## 2023-07-28 NOTE — PROGRESS NOTE ADULT - ASSESSMENT
46 y/o female w/ pmhx of postcholecyystectomy and UTI presenting with fever, body aches, cough, vomiting, headache and SOB for 5 days. Admitted for transaminitis and fever.     # Headaches, Neck stiffness, Thrombocytopenia, Hemolysis?, transaminitis, Fevers, hyponatremia recent exposure to possible bug bit in the yard. ->suspected  tick born illness-> babesia, anaplasma, ehrlichiosis , lyme vs viral meningitis-> HSV , enterovirus vs Legionaires ?  - CT abd, Chest - neg   - CTH neg  - RUQ- neg   - Fevers defervescing   - check hemolysis panel   - check parasite smear  - start atovaquone , azithromycin   - stop ceftriaxone  - continue doxy   - Need for LP?-> at this time patient is improving on this regimen. Doubt bacterial maningitis. if fevers do not improve over the weekend will need LP   - Acyclovir may be added 10 mg/kg q 8 hours if fevers worsens   - Trend LFT, plt, hgb   - follow-up Babesia PCR, Anaplasma/Ehrlichiosis PCR, Lyme C6 vise   - follow-up leptospirosis -ab   - follow-up EBV serologies/ CMV serologies   - follow-up     Diet: DASH/TLC  DVT PPX: heparin SubQ  GI PPX: PPI  Activity: WBAT  Code: Full code  #Progress Note Handoff  Pending (specify): tick born workup, on IV abx, Fever monitoring, HGB/ hemolysis panel , PLT, transaminitis monitoring, possible need for LP.   Family discussion: spoke to patient and daughter at bedside  Disposition: unknown

## 2023-07-28 NOTE — PROGRESS NOTE ADULT - ASSESSMENT
ASSESSMENT  46 y/o F w/ pmhx pf cholecystectomy who presents with fever, sweats, chills, body aches, dry cough, headache, shortness of breath, and one episode of vomiting for 5 days.    IMPRESSION  #Fever  #Headache    #Transaminitis  - RUQ 7/26 - WNL, s/p cholecystectomy  - Acute Hepatitis serologies negative  - Monospot negative    #Leukopenia/Thrombcytopenia     RECOMMENDATIONS  - developing anemia/thrombocytopenia -- follow-up hemolysis panel -- add atovaquone 750 mg q 12 hours (in addition to azithromycin) to cover for Babesia until PCR results  - check parasite smear (ordered)  - continue doxycycline 100 mg BID  - continue azithromycin 500 mg daily   - CT Chest reviewed -- low suspicion for pneumonia - can stop ceftriaxone  - trend LFTs/PLT/Hgb  - follow-up Babesia PCR, Anaplasma/Ehrlichiosis PCR, Lyme C6 vise   - follow-up leptospirosis -ab   - follow-up EBV serologies/ CMV serologies   - follow-up HIV (ordered for AM labs tomorrow)       Question of LP -- this may be related to tick born illness -- Fever curve potentially improving -- if persistent fevers into Sunday, can give acyclovir 10 mg/kg q 8 hours and woul need LP at that point, although will wait for tick born work-up     Please call or message on [employer] Teams if with any questions.  Spectra [phone]

## 2023-07-28 NOTE — PROGRESS NOTE ADULT - SUBJECTIVE AND OBJECTIVE BOX
Medical Student Note    Patient Information:  CAMILA BENNETT / 47y / Female / MRN#:[id number]    Hospital Day: 2d      Interval History:   Pt offers no c/o any headache or abdominal pain. Currently on 2L O2 NC, will wean of oxygen.     Past Medical History:  Pyelitis    Urinary tract infection      Past Surgical History:  UTI (urinary tract infection)    No significant past surgical history    History of cholecystectomy      Allergies:  No Known Allergies    Medications:  PRN:    Standing:  bismuth subsalicylate Liquid 525 milliGRAM(s) Oral once  cefTRIAXone   IVPB 2000 milliGRAM(s) IV Intermittent every 24 hours  diatrizoate meglumine/diatrizoate sodium. 30 milliLiter(s) Oral once  doxycycline IVPB 100 milliGRAM(s) IV Intermittent two times a day  enoxaparin Injectable 40 milliGRAM(s) SubCutaneous every 24 hours  pantoprazole    Tablet 40 milliGRAM(s) Oral before breakfast  sodium chloride 0.9%. 1000 milliLiter(s) (100 mL/Hr) IV Continuous <Continuous>    Home:    Vitals:  T(C): 36.4, Max: 38.8 (07-28-23 @ 01:51)  T(F): 97.5, Max: 101.9 (07-28-23 @ 01:51)  HR: 78 (78 - 112)  BP: 104/57 (104/57 - 145/72)  RR: 18 (18 - 22)  SpO2: 99% (92% - 99%)    Physical Exam:  General: Awake, Alert. Not in acute distress this morning  Heart: Regular rate and rhythm; S1, S2; No murmurs.  Lungs: Clear to auscultation bilaterally. Decreased breath sounds bilaterally.  Abdomen: Soft, nontender, nondistended.  Extremities: No edema in upper or lower extremities.  Neuro: AAOx3, No focal deficits.    Labs:  CBC (07-27 @ 13:33)                        Hgb: 12.1   WBC: 9.95  )-----------------( Plts: 104                              Hct: 36.0     Chem (07-28 @ 07:37)  Na: 137  |     Cl: 100     |  BUN: 9   -----------------------------------------< Gluc: 113    K: 3.7   |    HCO3: 27  |  Cr: 0.6    Ca 7.8 (07-28 @ 07:37)  Mg 2.3 (07-28 @ 07:37)    LFTs (07-28 @ 07:37)  TPro 5.4  /  Alb 2.8  TBili 0.5  /  DBili       /    /  AlkPhos 239    Cardiac Markers (07-27 @ 13:33)  Troponin T <0.01      Cardiac Markers (07-26 @ 21:57)  Lactate 1.4  ESR 10        PT/INR (07-27 @ 13:33)  PT: 11.90; INR: 1.04   PTT: 44.8          Microbiology:  Culture - Blood (collected 07-26 @ 21:57)  Source: .Blood None  Preliminary Report (07-28 @ 02:02):    No growth at 24 hours    Culture - Urine (collected 07-22 @ 18:25)  Source: Clean Catch Clean Catch (Midstream)  Final Report (07-24 @ 16:22):    >=3 organisms. Probable collection contamination.        Radiology:   Medical Student Note    Patient Information:  CAMILA BENNETT / 47y / Female / MRN#:[id number]    Hospital Day: 2d      Interval History:  Pt offers no c/o any headache or abdominal pain. Resting comfortably in bed. Currently on 2L O2 NC, will wean of oxygen.     Past Medical History:  Pyelitis    Urinary tract infection      Past Surgical History:  UTI (urinary tract infection)    No significant past surgical history    History of cholecystectomy      Allergies:  No Known Allergies    Medications:  PRN:    Standing:  bismuth subsalicylate Liquid 525 milliGRAM(s) Oral once  cefTRIAXone   IVPB 2000 milliGRAM(s) IV Intermittent every 24 hours  diatrizoate meglumine/diatrizoate sodium. 30 milliLiter(s) Oral once  doxycycline IVPB 100 milliGRAM(s) IV Intermittent two times a day  enoxaparin Injectable 40 milliGRAM(s) SubCutaneous every 24 hours  pantoprazole    Tablet 40 milliGRAM(s) Oral before breakfast  sodium chloride 0.9%. 1000 milliLiter(s) (100 mL/Hr) IV Continuous <Continuous>    Home:    Vitals:  T(C): 36.4, Max: 38.8 (07-28-23 @ 01:51)  T(F): 97.5, Max: 101.9 (07-28-23 @ 01:51)  HR: 78 (78 - 112)  BP: 104/57 (104/57 - 145/72)  RR: 18 (18 - 22)  SpO2: 99% (92% - 99%)    Physical Exam:  General: Awake, Alert. Not in acute distress this morning  Heart: Regular rate and rhythm;  Lungs: Clear and Decreased breath sounds bilaterally.  Abdomen: Soft, nontender, nondistended.  Extremities: No edema in upper or lower extremities.  Neuro: AAOx3, No focal deficits.    Labs:  CBC (07-27 @ 13:33)                        Hgb: 12.1   WBC: 9.95  )-----------------( Plts: 104                              Hct: 36.0     Chem (07-28 @ 07:37)  Na: 137  |     Cl: 100     |  BUN: 9   -----------------------------------------< Gluc: 113    K: 3.7   |    HCO3: 27  |  Cr: 0.6    Ca 7.8 (07-28 @ 07:37)  Mg 2.3 (07-28 @ 07:37)    LFTs (07-28 @ 07:37)  TPro 5.4  /  Alb 2.8  TBili 0.5  /  DBili       /    /  AlkPhos 239    Cardiac Markers (07-27 @ 13:33)  Troponin T <0.01      Cardiac Markers (07-26 @ 21:57)  Lactate 1.4  ESR 10      PT/INR (07-27 @ 13:33)  PT: 11.90; INR: 1.04   PTT: 44.8        Microbiology:  Culture - Blood (collected 07-26 @ 21:57)  Source: .Blood None  Preliminary Report (07-28 @ 02:02):    No growth at 24 hours    Culture - Urine (collected 07-22 @ 18:25)  Source: Clean Catch Clean Catch (Midstream)  Final Report (07-24 @ 16:22):    >=3 organisms. Probable collection contamination.   Medical Student Note    Patient Information:  CAMILA BENNETT / 47y / Female / MRN#:[id number]    Hospital Day: 2d      Interval History:  Pt offers no c/o any headache or abdominal pain. Resting comfortably in bed. Currently on 2L O2 NC, will wean of oxygen.     Past Medical History:  Pyelitis    Urinary tract infection      Past Surgical History:  UTI (urinary tract infection)    No significant past surgical history    History of cholecystectomy      Allergies:  No Known Allergies    Medications:  PRN:    Standing:  bismuth subsalicylate Liquid 525 milliGRAM(s) Oral once  cefTRIAXone   IVPB 2000 milliGRAM(s) IV Intermittent every 24 hours  diatrizoate meglumine/diatrizoate sodium. 30 milliLiter(s) Oral once  doxycycline IVPB 100 milliGRAM(s) IV Intermittent two times a day  enoxaparin Injectable 40 milliGRAM(s) SubCutaneous every 24 hours  pantoprazole    Tablet 40 milliGRAM(s) Oral before breakfast  sodium chloride 0.9%. 1000 milliLiter(s) (100 mL/Hr) IV Continuous <Continuous>    Home:    Vitals:  T(C): 36.4, Max: 38.8 (07-28-23 @ 01:51)  T(F): 97.5, Max: 101.9 (07-28-23 @ 01:51)  HR: 78 (78 - 112)  BP: 104/57 (104/57 - 145/72)  RR: 18 (18 - 22)  SpO2: 99% (92% - 99%)    Physical Exam:  General: Awake, lethargic. Not in acute distress this morning  Heart: Regular rate and rhythm;  Lungs: Clear and Decreased breath sounds bilaterally.  Abdomen: Soft, nontender, nondistended.  Extremities: No edema in upper or lower extremities.  Neuro: AAOx3, No focal deficits.    Labs:  CBC (07-27 @ 13:33)                        Hgb: 12.1   WBC: 9.95  )-----------------( Plts: 104                              Hct: 36.0     Chem (07-28 @ 07:37)  Na: 137  |     Cl: 100     |  BUN: 9   -----------------------------------------< Gluc: 113    K: 3.7   |    HCO3: 27  |  Cr: 0.6    Ca 7.8 (07-28 @ 07:37)  Mg 2.3 (07-28 @ 07:37)    LFTs (07-28 @ 07:37)  TPro 5.4  /  Alb 2.8  TBili 0.5  /  DBili       /    /  AlkPhos 239    Cardiac Markers (07-27 @ 13:33)  Troponin T <0.01      Cardiac Markers (07-26 @ 21:57)  Lactate 1.4  ESR 10      PT/INR (07-27 @ 13:33)  PT: 11.90; INR: 1.04   PTT: 44.8        Microbiology:  Culture - Blood (collected 07-26 @ 21:57)  Source: .Blood None  Preliminary Report (07-28 @ 02:02):    No growth at 24 hours    Culture - Urine (collected 07-22 @ 18:25)  Source: Clean Catch Clean Catch (Midstream)  Final Report (07-24 @ 16:22):    >=3 organisms. Probable collection contamination.

## 2023-07-28 NOTE — PROGRESS NOTE ADULT - SUBJECTIVE AND OBJECTIVE BOX
SUBJECTIVE:    Patient is a 47y old Female who presents with a chief complaint of fever in setting of elevated LFTs (28 Jul 2023 12:11)    Currently admitted to medicine with the primary diagnosis of: fever    Today is hospital day 2d.     Overnight Events:     Patient still having fevers. Has significant HA. Pain in her neck and stiffness.     PAST MEDICAL & SURGICAL HISTORY  Pyelitis    Urinary tract infection    History of cholecystectomy    ALLERGIES:  No Known Allergies    MEDICATIONS:  STANDING MEDICATIONS  atovaquone  Suspension 750 milliGRAM(s) Oral two times a day  azithromycin  IVPB 500 milliGRAM(s) IV Intermittent every 24 hours  bismuth subsalicylate Liquid 525 milliGRAM(s) Oral once  diatrizoate meglumine/diatrizoate sodium. 30 milliLiter(s) Oral once  doxycycline IVPB 100 milliGRAM(s) IV Intermittent two times a day  enoxaparin Injectable 40 milliGRAM(s) SubCutaneous every 24 hours  pantoprazole    Tablet 40 milliGRAM(s) Oral before breakfast  sodium chloride 0.9%. 1000 milliLiter(s) IV Continuous <Continuous>    PRN MEDICATIONS  acetaminophen     Tablet .. 650 milliGRAM(s) Oral every 6 hours PRN    VITALS:   ICU Vital Signs Last 24 Hrs  T(C): 36.4 (28 Jul 2023 11:30), Max: 38.8 (28 Jul 2023 01:51)  T(F): 97.6 (28 Jul 2023 11:30), Max: 101.9 (28 Jul 2023 01:51)  HR: 78 (28 Jul 2023 07:44) (78 - 112)  BP: 104/57 (28 Jul 2023 07:44) (104/57 - 145/72)  RR: 18 (28 Jul 2023 11:30) (18 - 18)  SpO2: 96% (28 Jul 2023 11:30) (96% - 99%)    O2 Parameters below as of 28 Jul 2023 11:30  Patient On (Oxygen Delivery Method): room air      LABS:                        11.7   11.51 )-----------( 106      ( 28 Jul 2023 07:37 )             34.4     07-28    137  |  100  |  9<L>  ----------------------------<  113<H>  3.7   |  27  |  0.6<L>    Ca    7.8<L>      28 Jul 2023 07:37  Mg     2.3     07-28    TPro  5.4<L>  /  Alb  2.8<L>  /  TBili  0.5  /  DBili  x   /  AST  100<H>  /  ALT  251<H>  /  AlkPhos  239<H>  07-28    PT/INR - ( 27 Jul 2023 13:33 )   PT: 11.90 sec;   INR: 1.04 ratio         PTT - ( 27 Jul 2023 13:33 )  PTT:44.8 sec  Urinalysis Basic - ( 28 Jul 2023 07:37 )    Color: x / Appearance: x / SG: x / pH: x  Gluc: 113 mg/dL / Ketone: x  / Bili: x / Urobili: x   Blood: x / Protein: x / Nitrite: x   Leuk Esterase: x / RBC: x / WBC x   Sq Epi: x / Non Sq Epi: x / Bacteria: x        Troponin T, Serum: <0.01 ng/mL (07-27-23 @ 13:33)      Culture - Blood (collected 26 Jul 2023 21:57)  Source: .Blood None  Preliminary Report (28 Jul 2023 02:02):    No growth at 24 hours      CARDIAC MARKERS ( 27 Jul 2023 13:33 )  x     / <0.01 ng/mL / x     / x     / x            RADIOLOGY:    < from: CT Abdomen and Pelvis w/ Oral Cont and w/wo IV Cont (07.27.23 @ 22:55) >  IMPRESSION:    No acute intra-abdominal or pelvic pathology..    Please see separately dictated report CT chest performed concurrently for   intrathoracic findings.    --- End of Report ---    < end of copied text >  < from: CT Chest w/ IV Cont (07.27.23 @ 22:55) >  IMPRESSION:    Atelectasis scarring in right lower lobe. Bibasilar atelectasis (most   pronounced left lower lobe).    3 mm solid nodule in the left upper lobe (6-17).    CT abdomen and pelvis performed on the same day, see separate report    --- End of Report ---    < end of copied text >  < from: US Abdomen Upper Quadrant Right (07.26.23 @ 03:26) >  IMPRESSION:  Unremarkable postcholecystectomy right upper quadrant abdominal   ultrasound.        --- End of Report ---    < end of copied text >  < from: Xray Chest 1 View- PORTABLE-Urgent (Xray Chest 1 View- PORTABLE-Urgent .) (07.26.23 @ 01:19) >  Impression:    Low lung volumes with left basilar opacity/effusion.        --- End of Report ---    < end of copied text >  < from: CT Head No Cont (07.22.23 @ 17:15) >  IMPRESSION:    No acute intracranial pathology. No evidence of midline shift, mass   effect or intracranial hemorrhage.    --- End of Report ---    < end of copied text >        PHYSICAL EXAM:    GENERAL: Ill appearing female. uncomfortable   HEENT: meningeal signs+ Brudzinski sign + and Kernig sign +  CHEST/LUNG: Clear to auscultation bilaterally; No rales, rhonchi, wheezing, or rubs. Unlabored respirations  HEART: Regular rate and rhythm; No murmurs, rubs, or gallops  ABDOMEN: Bowel sounds present; Soft, Nontender, Nondistended. No hepatomegally  EXTREMITIES:  no edema   NERVOUS SYSTEM:  Alert & Oriented X3, speech clear. No deficits   MSK: FROM all 4 extremities, full and equal strength  SKIN: No rashes or lesions

## 2023-07-28 NOTE — PROGRESS NOTE ADULT - SUBJECTIVE AND OBJECTIVE BOX
CAMILA BENNETT  47y, Female  Allergy: No Known Allergies      LOS  2d    CHIEF COMPLAINT: fever in setting of elevated LFTs (28 Jul 2023 11:13)      INTERVAL EVENTS/HPI  - No acute events overnight  - T(F): , Max: 101.9 (07-28-23 @ 01:51)  - less nausea/vomting today - headaches a little better  - WBC Count: 11.51 (07-28-23 @ 07:37)  WBC Count: 9.95 (07-27-23 @ 13:33)     - Creatinine: 0.6 (07-28-23 @ 07:37)  Creatinine: 0.7 (07-27-23 @ 21:35)       ROS  General: Denies rigors, nightsweats  HEENT: Denies headache, rhinorrhea, sore throat, eye pain  CV: Denies CP, palpitations  PULM: Denies wheezing, hemoptysis  GI: Denies hematemesis, hematochezia, melena  : Denies discharge, hematuria  MSK: Denies arthralgias, myalgias  SKIN: Denies rash, lesions  NEURO: Denies paresthesias, weakness  PSYCH: Denies depression, anxiety    VITALS:  T(F): 97.5, Max: 101.9 (07-28-23 @ 01:51)  HR: 78  BP: 104/57  RR: 18Vital Signs Last 24 Hrs  T(C): 36.4 (28 Jul 2023 07:44), Max: 38.8 (28 Jul 2023 01:51)  T(F): 97.5 (28 Jul 2023 07:44), Max: 101.9 (28 Jul 2023 01:51)  HR: 78 (28 Jul 2023 07:44) (78 - 112)  BP: 104/57 (28 Jul 2023 07:44) (104/57 - 145/72)  BP(mean): --  RR: 18 (28 Jul 2023 07:44) (18 - 22)  SpO2: 99% (28 Jul 2023 07:44) (92% - 99%)    Parameters below as of 28 Jul 2023 07:44  Patient On (Oxygen Delivery Method): nasal cannula        PHYSICAL EXAM:  Gen: NAD, resting in bed  HEENT: Normocephalic, atraumatic  Neck: supple, no lymphadenopathy  CV: Regular rate & regular rhythm  Lungs: decreased BS at bases, no fremitus  Abdomen: Soft, BS present  Ext: Warm, well perfused  Neuro: non focal, awake  Skin: no rash, no erythema  Lines: no phlebitis    FH: Non-contributory  Social Hx: Non-contributory    TESTS & MEASUREMENTS:                        11.7   11.51 )-----------( 106      ( 28 Jul 2023 07:37 )             34.4     07-28    137  |  100  |  9<L>  ----------------------------<  113<H>  3.7   |  27  |  0.6<L>    Ca    7.8<L>      28 Jul 2023 07:37  Mg     2.3     07-28    TPro  5.4<L>  /  Alb  2.8<L>  /  TBili  0.5  /  DBili  x   /  AST  100<H>  /  ALT  251<H>  /  AlkPhos  239<H>  07-28      LIVER FUNCTIONS - ( 28 Jul 2023 07:37 )  Alb: 2.8 g/dL / Pro: 5.4 g/dL / ALK PHOS: 239 U/L / ALT: 251 U/L / AST: 100 U/L / GGT: x           Urinalysis Basic - ( 28 Jul 2023 07:37 )    Color: x / Appearance: x / SG: x / pH: x  Gluc: 113 mg/dL / Ketone: x  / Bili: x / Urobili: x   Blood: x / Protein: x / Nitrite: x   Leuk Esterase: x / RBC: x / WBC x   Sq Epi: x / Non Sq Epi: x / Bacteria: x        Culture - Blood (collected 07-26-23 @ 21:57)  Source: .Blood None  Preliminary Report (07-28-23 @ 02:02):    No growth at 24 hours    Culture - Urine (collected 07-22-23 @ 18:25)  Source: Clean Catch Clean Catch (Midstream)  Final Report (07-24-23 @ 16:22):    >=3 organisms. Probable collection contamination.        Lactate, Blood: 1.4 mmol/L (07-26-23 @ 21:57)      INFECTIOUS DISEASES TESTING  Procalcitonin, Serum: 0.35 (07-26-23 @ 21:57)  Rapid RVP Result: NotDetec (07-26-23 @ 15:33)      INFLAMMATORY MARKERS  C-Reactive Protein, Serum: 51.4 mg/L (07-26-23 @ 21:57)  Sedimentation Rate, Erythrocyte: 10 mm/Hr (07-26-23 @ 21:57)      RADIOLOGY & ADDITIONAL TESTS:  I have personally reviewed the last available Chest xray  CXR  Xray Chest 1 View- PORTABLE-Urgent:   ACC: [id number] EXAM:  XR CHEST PORTABLE URGENT 1V   ORDERED BY: PETAR MÉNDEZ     PROCEDURE DATE:  07/27/2023          INTERPRETATION:  Clinical History / Reason for exam: Dyspnea    Comparison : Chest radiograph 7/26/2023.    Technique/Positioning: Frontal.    Findings:    Support devices: None.    Cardiac/mediastinum/hilum: Indeterminate    Lung parenchyma/Pleura: Within normal limits.    Skeleton/soft tissues: Unremarkable.    Impression:    No radiographic evidence of acute cardiopulmonary disease.        --- End of Report ---            KATIE BA MD; Attending Radiologist  This document has been electronically signed. Jul 27 2023  1:28PM (07-27-23 @ 13:01)      CT  CT Chest w/ IV Cont:   ACC: [id number] EXAM:  CT CHEST IC   ORDERED BY: PETAR MÉNDEZ     PROCEDURE DATE:  07/27/2023          INTERPRETATION:  CLINICAL INDICATION: Rule out source infection    TECHNIQUE:  CT of the thorax was performed after administration of contrast. Sagittal   and coronal reformats were performed as well as MIP reconstructions.  Intravenous contrast: 100 cc of Omnipaque 300    COMPARISON: None.    INTERPRETATION:    AIRWAYS, LUNGS AND PLEURA:  . The central tracheobronchial tree is patent.  Atelectasis scarring in   right lower lobe. Bibasilar atelectasis (most pronounced left lower   lobe). There is no pleural effusion. There is no pneumothorax.    PULMONARY NODULES: 3 mm solid nodule in the left upper lobe (6-17).    MEDIASTINUM: There are no enlarged mediastinal, hilar or axillary lymph   nodes.  The visualized portion of the thyroid gland is unremarkable.    HEART AND VESSELS: The heart is normal in size. The great vessels are   normal in caliber. There is no pericardial effusion.    BONESAND SOFT TISSUES: The bones and soft tissues are unremarkable.    TUBES AND LINES: None.    IMPRESSION:    Atelectasis scarring in right lower lobe. Bibasilar atelectasis (most   pronounced left lower lobe).    3 mm solid nodule in the left upper lobe (6-17).    CT abdomen and pelvis performed on the same day, see separate report    --- End of Report ---          ANGELIKA MONTIEL MD; Resident Radiologist  This document has been electronically signed.  RIVAS HALL MD; Attending Radiologist  Thisdocument has been electronically signed. Jul 28 2023 10:36AM (07-27-23 @ 22:55)      CARDIOLOGY TESTING  12 Lead ECG:   Ventricular Rate 113 BPM    Atrial Rate 113 BPM    P-R Interval 130 ms    QRS Duration 120 ms    Q-T Interval 322 ms    QTC Calculation(Bazett) 441 ms    P Axis 50 degrees    R Axis 4 degrees    T Axis 47 degrees    Diagnosis Line Sinus tachycardia  Right bundle branch block  Abnormal ECG    Confirmed by Tuan Rizvi ([id number]) on 7/27/2023 2:34:54 PM (07-27-23 @ 12:59)  12 Lead ECG:   Ventricular Rate 82 BPM    Atrial Rate 82 BPM    P-R Interval 140 ms    QRS Duration 120 ms    Q-T Interval 410 ms    QTC Calculation(Bazett) 479 ms    P Axis 16 degrees    R Axis 3 degrees    T Axis 5 degrees    Diagnosis Line Normal sinus rhythm  Right bundle branch block  Abnormal ECG    Confirmed by ROBERT VANEGAS MD ([id number]) on 7/26/2023 6:55:38 AM (07-26-23 @ 01:59)      MEDICATIONS  azithromycin   Tablet 500 Oral daily  bismuth subsalicylate Liquid 525 Oral once  cefTRIAXone   IVPB 2000 IV Intermittent every 24 hours  diatrizoate meglumine/diatrizoate sodium. 30 Oral once  doxycycline IVPB 100 IV Intermittent two times a day  enoxaparin Injectable 40 SubCutaneous every 24 hours  pantoprazole    Tablet 40 Oral before breakfast  sodium chloride 0.9%. 1000 IV Continuous <Continuous>      WEIGHT  Weight (kg): 68 (07-25-23 @ 23:46)  Creatinine: 0.6 mg/dL (07-28-23 @ 07:37)  Creatinine: 0.7 mg/dL (07-27-23 @ 21:35)  Creatinine: 0.6 mg/dL (07-27-23 @ 13:33)      ANTIBIOTICS:  azithromycin   Tablet 500 milliGRAM(s) Oral daily  cefTRIAXone   IVPB 2000 milliGRAM(s) IV Intermittent every 24 hours  doxycycline IVPB 100 milliGRAM(s) IV Intermittent two times a day      All available historical records have been reviewed

## 2023-07-29 LAB
ALBUMIN SERPL ELPH-MCNC: 2.8 G/DL — LOW (ref 3.5–5.2)
ALP SERPL-CCNC: 202 U/L — HIGH (ref 30–115)
ALT FLD-CCNC: 187 U/L — HIGH (ref 0–41)
ANION GAP SERPL CALC-SCNC: 9 MMOL/L — SIGNIFICANT CHANGE UP (ref 7–14)
AST SERPL-CCNC: 65 U/L — HIGH (ref 0–41)
B BURGDOR C6 AB SER-ACNC: NEGATIVE — SIGNIFICANT CHANGE UP
B BURGDOR IGG+IGM SER QL IB: SIGNIFICANT CHANGE UP
B BURGDOR IGG+IGM SER-ACNC: 0.19 INDEX — SIGNIFICANT CHANGE UP (ref 0.01–0.89)
BASOPHILS # BLD AUTO: 0.07 K/UL — SIGNIFICANT CHANGE UP (ref 0–0.2)
BASOPHILS NFR BLD AUTO: 0.7 % — SIGNIFICANT CHANGE UP (ref 0–1)
BILIRUB SERPL-MCNC: 0.3 MG/DL — SIGNIFICANT CHANGE UP (ref 0.2–1.2)
BUN SERPL-MCNC: 5 MG/DL — LOW (ref 10–20)
CALCIUM SERPL-MCNC: 7.7 MG/DL — LOW (ref 8.4–10.5)
CHLORIDE SERPL-SCNC: 103 MMOL/L — SIGNIFICANT CHANGE UP (ref 98–110)
CO2 SERPL-SCNC: 25 MMOL/L — SIGNIFICANT CHANGE UP (ref 17–32)
CREAT SERPL-MCNC: <0.5 MG/DL — LOW (ref 0.7–1.5)
EGFR: 123 ML/MIN/1.73M2 — SIGNIFICANT CHANGE UP
EOSINOPHIL # BLD AUTO: 0.03 K/UL — SIGNIFICANT CHANGE UP (ref 0–0.7)
EOSINOPHIL NFR BLD AUTO: 0.3 % — SIGNIFICANT CHANGE UP (ref 0–8)
GLUCOSE SERPL-MCNC: 111 MG/DL — HIGH (ref 70–99)
HAPTOGLOB SERPL-MCNC: 234 MG/DL — HIGH (ref 34–200)
HCT VFR BLD CALC: 34.4 % — LOW (ref 37–47)
HGB BLD-MCNC: 11.5 G/DL — LOW (ref 12–16)
IMM GRANULOCYTES NFR BLD AUTO: 0.7 % — HIGH (ref 0.1–0.3)
LEGIONELLA AG UR QL: NEGATIVE — SIGNIFICANT CHANGE UP
LYMPHOCYTES # BLD AUTO: 4.22 K/UL — HIGH (ref 1.2–3.4)
LYMPHOCYTES # BLD AUTO: 44.9 % — SIGNIFICANT CHANGE UP (ref 20.5–51.1)
MAGNESIUM SERPL-MCNC: 2.4 MG/DL — SIGNIFICANT CHANGE UP (ref 1.8–2.4)
MCHC RBC-ENTMCNC: 30.5 PG — SIGNIFICANT CHANGE UP (ref 27–31)
MCHC RBC-ENTMCNC: 33.4 G/DL — SIGNIFICANT CHANGE UP (ref 32–37)
MCV RBC AUTO: 91.2 FL — SIGNIFICANT CHANGE UP (ref 81–99)
MONOCYTES # BLD AUTO: 0.61 K/UL — HIGH (ref 0.1–0.6)
MONOCYTES NFR BLD AUTO: 6.5 % — SIGNIFICANT CHANGE UP (ref 1.7–9.3)
NEUTROPHILS # BLD AUTO: 4.39 K/UL — SIGNIFICANT CHANGE UP (ref 1.4–6.5)
NEUTROPHILS NFR BLD AUTO: 46.9 % — SIGNIFICANT CHANGE UP (ref 42.2–75.2)
NRBC # BLD: 0 /100 WBCS — SIGNIFICANT CHANGE UP (ref 0–0)
PLATELET # BLD AUTO: 197 K/UL — SIGNIFICANT CHANGE UP (ref 130–400)
PMV BLD: 11.3 FL — HIGH (ref 7.4–10.4)
POTASSIUM SERPL-MCNC: 3.7 MMOL/L — SIGNIFICANT CHANGE UP (ref 3.5–5)
POTASSIUM SERPL-SCNC: 3.7 MMOL/L — SIGNIFICANT CHANGE UP (ref 3.5–5)
PROT SERPL-MCNC: 5.6 G/DL — LOW (ref 6–8)
RBC # BLD: 3.77 M/UL — LOW (ref 4.2–5.4)
RBC # FLD: 16.1 % — HIGH (ref 11.5–14.5)
SODIUM SERPL-SCNC: 137 MMOL/L — SIGNIFICANT CHANGE UP (ref 135–146)
WBC # BLD: 9.39 K/UL — SIGNIFICANT CHANGE UP (ref 4.8–10.8)
WBC # FLD AUTO: 9.39 K/UL — SIGNIFICANT CHANGE UP (ref 4.8–10.8)
WNV IGG TITR FLD: NEGATIVE — SIGNIFICANT CHANGE UP
WNV IGM SPEC QL: NEGATIVE — SIGNIFICANT CHANGE UP

## 2023-07-29 PROCEDURE — 99232 SBSQ HOSP IP/OBS MODERATE 35: CPT

## 2023-07-29 RX ORDER — TRAMADOL HYDROCHLORIDE 50 MG/1
25 TABLET ORAL ONCE
Refills: 0 | Status: DISCONTINUED | OUTPATIENT
Start: 2023-07-29 | End: 2023-07-29

## 2023-07-29 RX ADMIN — PANTOPRAZOLE SODIUM 40 MILLIGRAM(S): 20 TABLET, DELAYED RELEASE ORAL at 06:12

## 2023-07-29 RX ADMIN — ATOVAQUONE 750 MILLIGRAM(S): 750 SUSPENSION ORAL at 18:11

## 2023-07-29 RX ADMIN — Medication 100 MILLIGRAM(S): at 06:17

## 2023-07-29 RX ADMIN — ENOXAPARIN SODIUM 40 MILLIGRAM(S): 100 INJECTION SUBCUTANEOUS at 06:12

## 2023-07-29 RX ADMIN — ATOVAQUONE 750 MILLIGRAM(S): 750 SUSPENSION ORAL at 06:13

## 2023-07-29 RX ADMIN — AZITHROMYCIN 255 MILLIGRAM(S): 500 TABLET, FILM COATED ORAL at 12:11

## 2023-07-29 RX ADMIN — Medication 650 MILLIGRAM(S): at 04:15

## 2023-07-29 RX ADMIN — TRAMADOL HYDROCHLORIDE 25 MILLIGRAM(S): 50 TABLET ORAL at 07:16

## 2023-07-29 RX ADMIN — Medication 100 MILLIGRAM(S): at 18:11

## 2023-07-29 RX ADMIN — TRAMADOL HYDROCHLORIDE 25 MILLIGRAM(S): 50 TABLET ORAL at 06:16

## 2023-07-29 NOTE — PROGRESS NOTE ADULT - ASSESSMENT
Patient was called for clinic visit pre-screening. Pt advised to not arrive more than 10 minutes prior to appointment. Patient made aware of face mask and visitor policy, patient in agreement to comply.      COVID-19 Telephone Triage  Has the patient tested positive for Coronavirus within the past month or in the process of testing for Coronavirus now?    NO - Has the patient or anyone in the patient's household, or if the patient will require a person to accompany them in the exam room (patient is a minor, disabled, etc.) has that person tested positive for Coronavirus within the past month? No - Identify symptoms and obtain travel and exposure history:       Does the patient or any member of the patient's household, or the person who will accompany the patient to their appointment have a fever, sore throat, cough, shortness of breath, runny nose, congestion, nausea, vomiting, diarrhea, shaking or chills or new onset of loss of taste or smell? no    In the past 14 days has the patient or the person who will accompany the patient to their appointment, traveled outside of [place]? (For patients who live in [place], do not consider [place] as out of state travel) no    In the past 14 days has the patient or the person who will accompany the patient to their appointment, had close contact with an individual outside of their household who has received a Coronavirus diagnosis or had contact with anyone who is in the process of testing? no  If all the questions in the screening were NO-   Clinic visit can proceed as scheduled or be scheduled per current scheduling protocols.     [place] was advised of increased risk of COVID-19 exposure due to contact with communal spaces and additional persons. Patient Proceed with appointment as scheduled. .             If the patient needs to be seen in the ED for any reason:  Call the ED of patient’s choice to let them know of a patient with a positive COVID-19 screen is being sent  46 y/o female w/ PMHx of postcholecystectomy and UTI presenting with fever, body aches, cough, vomiting, headache and SOB for 5 days and admitted for transaminitis and fever.     #Transaminitis  #Headache  #Fever   - possible tick born illness vs. other infectious agent  - CT A/P (7/27): No acute intra-abdominal or pelvic pathology.  - CT Chest(7/27):  Atelectasis scarring in right lower lobe. Bibasilar atelectasis (most pronounced left lower lobe). 3 mm solid nodule in the left upper lobe (6-17).  - RUQ US unremarkable   - elevated LFTs on admission  - LDH and Haptoglobin elevated; hemolytic process occuring?   - acetaminophen level wnl  - Blood Cxs: NGTD  - CRP and Procal elevated  - Lipase, lactate wnl  - Lyme, Babesia, CMV, HIV, Hep panel/Hep B, HSV2, RVP all negative; Positive for past EBV and HSV1  - Hepatology rec appreciated    - d/c Rocephin (low suspicion for pneumonia)  - c/w Doxy, Azithromycin, Atovaquone  - c/w Tylenol PRN for pain and fever  - Monitoring LFTs daily (currently downtrending)  - Monitor for fever spikes  - F/u Ehrlichia/anaplasma, West Nile, Legionella, Leptospirosis  - ID following: Question of LP -- this may be related to tick born illness -- Fever curve potentially improving -- if persistent fevers into Sunday, can give acyclovir 10 mg/kg q 8 hours and woul need LP at that point, although will wait for tick born work-up       #Hyponatremia  - s/p NS 100ml/hr    DVT ppx: Lovenox  GI ppx: Protonix  Diet: regular  Activity: ambulate as tolerated  Code: Full  Dispo: acute to the ED for further evaluation.    Have the ED clarify the location the patient should go once they arrive at the ED. [place] Take note of any instructions givento you by the ED.    Communicate instructions provided by the ED to the patient.  Inform the patient they must put on a mask once they arrive to the ED.

## 2023-07-29 NOTE — PROGRESS NOTE ADULT - SUBJECTIVE AND OBJECTIVE BOX
CAMILA BENNETT  47y  Female      Patient is a 47y old  Female who presents with a chief complaint of fever in setting of elevated LFTs (29 Jul 2023 10:21)      INTERVAL HPI/OVERNIGHT EVENTS:  Still with fever, mild diarrhea,   Vital Signs Last 24 Hrs  T(C): 35.7 (28 Jul 2023 23:17), Max: 37.8 (28 Jul 2023 16:10)  T(F): 96.3 (28 Jul 2023 23:17), Max: 100.1 (28 Jul 2023 16:10)  HR: 70 (29 Jul 2023 07:12) (70 - 102)  BP: 112/69 (29 Jul 2023 07:12) (111/72 - 117/55)  BP(mean): --  RR: 18 (29 Jul 2023 07:12) (18 - 18)  SpO2: 96% (29 Jul 2023 07:12) (96% - 99%)    Parameters below as of 29 Jul 2023 07:12  Patient On (Oxygen Delivery Method): room air          07-28-23 @ 07:01  -  07-29-23 @ 07:00  --------------------------------------------------------  IN: 720 mL / OUT: 0 mL / NET: 720 mL            Consultant(s) Notes Reviewed:  [x ] YES  [ ] NO          MEDICATIONS  (STANDING):  atovaquone  Suspension 750 milliGRAM(s) Oral two times a day  azithromycin  IVPB 500 milliGRAM(s) IV Intermittent every 24 hours  bismuth subsalicylate Liquid 525 milliGRAM(s) Oral once  diatrizoate meglumine/diatrizoate sodium. 30 milliLiter(s) Oral once  doxycycline IVPB 100 milliGRAM(s) IV Intermittent two times a day  enoxaparin Injectable 40 milliGRAM(s) SubCutaneous every 24 hours  pantoprazole    Tablet 40 milliGRAM(s) Oral before breakfast    MEDICATIONS  (PRN):  acetaminophen     Tablet .. 650 milliGRAM(s) Oral every 6 hours PRN Temp greater or equal to 38C (100.4F), Moderate Pain (4 - 6)      LABS                          11.5   9.39  )-----------( 197      ( 29 Jul 2023 08:12 )             34.4     07-29    137  |  103  |  5<L>  ----------------------------<  111<H>  3.7   |  25  |  <0.5<L>    Ca    7.7<L>      29 Jul 2023 08:12  Mg     2.4     07-29    TPro  5.6<L>  /  Alb  2.8<L>  /  TBili  0.3  /  DBili  x   /  AST  65<H>  /  ALT  187<H>  /  AlkPhos  202<H>  07-29      Urinalysis Basic - ( 29 Jul 2023 08:12 )    Color: x / Appearance: x / SG: x / pH: x  Gluc: 111 mg/dL / Ketone: x  / Bili: x / Urobili: x   Blood: x / Protein: x / Nitrite: x   Leuk Esterase: x / RBC: x / WBC x   Sq Epi: x / Non Sq Epi: x / Bacteria: x        Lactate Trend  07-26 @ 21:57 Lactate:1.4         CAPILLARY BLOOD GLUCOSE          Babesia microti PCR, Bld (collected 07-28-23 @ 07:37)    Culture - Blood (collected 07-26-23 @ 21:57)  Source: .Blood None  Preliminary Report (07-29-23 @ 02:02):    No growth at 48 Hours    Culture - Urine (collected 07-22-23 @ 18:25)  Source: Clean Catch Clean Catch (Midstream)  Final Report (07-24-23 @ 16:22):    >=3 organisms. Probable collection contamination.        RADIOLOGY & ADDITIONAL TESTS:    Imaging Personally Reviewed:  [ ] YES  [ ] NO    HEALTH ISSUES - PROBLEM Dx:          PHYSICAL EXAM:  GENERAL: NAD, well-developed.  HEAD:  Atraumatic, Normocephalic.  EYES: EOMI, PERRLA, conjunctiva and sclera clear.  NECK: Supple, No JVD.  CHEST/LUNG: Clear to auscultation bilaterally; No wheeze.  HEART: Regular rate and rhythm; S1 S2.   ABDOMEN: Soft, Nontender, Nondistended; Bowel sounds present.  EXTREMITIES:  2+ Peripheral Pulses, No clubbing, cyanosis, or edema.  PSYCH: AAOx3.  NEUROLOGY: non-focal.  SKIN: No rashes or lesions.

## 2023-07-29 NOTE — PROGRESS NOTE ADULT - ASSESSMENT
46 y/o female w/ pmhx of postcholecyystectomy and UTI presenting with fever, body aches, cough, vomiting, headache and SOB for 5 days. Admitted for transaminitis and fever.     A/P:   Fever  Acute Transaminitis:   Thrombocytopenia: improved.   Patient with diarrhea, body aches, fever.   labs showed elevated LFTs , , Alk P 348, now improving  Abdomen US normal, CT chest, abdomen and pelvis showed no acute pathology.   infectious work up so far negative, includes EBV, CMV, HCV, HBV, HIV, HSV, Babesia, RVP,   Pending: Anaplasma, tick borne panel, leptospirosis   On Atovaquone Azithromycin and Doxycycline, can stop Atovaquone ID follow up.       DVT PPX: heparin SubQ  Code: Full code  #Progress Note Handoff  Pending (specify): tick born workup, improving fever.    Family discussion: spoke to patient and daughter at bedside  Disposition: unknown

## 2023-07-29 NOTE — PROGRESS NOTE ADULT - SUBJECTIVE AND OBJECTIVE BOX
SUBJECTIVE:    Patient is a 47y old Female who presents with a chief complaint of fever in setting of elevated LFTs (28 Jul 2023 12:28)    Currently admitted to medicine with the primary diagnosis of Transaminitis       Today is hospital day 3d. This morning she is resting comfortably in bed. Feeling better after Tylenol     PAST MEDICAL & SURGICAL HISTORY  Pyelitis    Urinary tract infection    History of cholecystectomy      SOCIAL HISTORY:  Negative for smoking/alcohol/drug use.     ALLERGIES:  No Known Allergies    MEDICATIONS:  STANDING MEDICATIONS  atovaquone  Suspension 750 milliGRAM(s) Oral two times a day  azithromycin  IVPB 500 milliGRAM(s) IV Intermittent every 24 hours  bismuth subsalicylate Liquid 525 milliGRAM(s) Oral once  diatrizoate meglumine/diatrizoate sodium. 30 milliLiter(s) Oral once  doxycycline IVPB 100 milliGRAM(s) IV Intermittent two times a day  enoxaparin Injectable 40 milliGRAM(s) SubCutaneous every 24 hours  pantoprazole    Tablet 40 milliGRAM(s) Oral before breakfast  sodium chloride 0.9%. 1000 milliLiter(s) IV Continuous <Continuous>    PRN MEDICATIONS  acetaminophen     Tablet .. 650 milliGRAM(s) Oral every 6 hours PRN    VITALS:   T(F): 96.3  HR: 70  BP: 112/69  RR: 18  SpO2: 96%    LABS:                        11.5   9.39  )-----------( 197      ( 29 Jul 2023 08:12 )             34.4     07-29    137  |  103  |  5<L>  ----------------------------<  111<H>  3.7   |  25  |  <0.5<L>    Ca    7.7<L>      29 Jul 2023 08:12  Mg     2.4     07-29    TPro  5.6<L>  /  Alb  2.8<L>  /  TBili  0.3  /  DBili  x   /  AST  65<H>  /  ALT  187<H>  /  AlkPhos  202<H>  07-29    PT/INR - ( 27 Jul 2023 13:33 )   PT: 11.90 sec;   INR: 1.04 ratio         PTT - ( 27 Jul 2023 13:33 )  PTT:44.8 sec  Urinalysis Basic - ( 29 Jul 2023 08:12 )    Color: x / Appearance: x / SG: x / pH: x  Gluc: 111 mg/dL / Ketone: x  / Bili: x / Urobili: x   Blood: x / Protein: x / Nitrite: x   Leuk Esterase: x / RBC: x / WBC x   Sq Epi: x / Non Sq Epi: x / Bacteria: x            Babesia microti PCR, Bld (collected 28 Jul 2023 07:37)    Culture - Blood (collected 26 Jul 2023 21:57)  Source: .Blood None  Preliminary Report (29 Jul 2023 02:02):    No growth at 48 Hours      CARDIAC MARKERS ( 27 Jul 2023 13:33 )  x     / <0.01 ng/mL / x     / x     / x          RADIOLOGY:    PHYSICAL EXAM:  GEN: Appearing better  LUNGS: Clear to auscultation bilaterally   HEART: S1/S2 present. RRR.   ABD: Soft, non-tender, non-distended. Bowel sounds present  EXT: NC/NC/NE/2+PP/BROWN/Skin Intact.   NEURO: AAOX3    Intravenous access:   NG tube:   Noguera Catheter:

## 2023-07-30 LAB
A PHAGOCYTOPH IGG TITR SER IF: ABNORMAL TITER
ALBUMIN SERPL ELPH-MCNC: 3.2 G/DL — LOW (ref 3.5–5.2)
ALP SERPL-CCNC: 260 U/L — HIGH (ref 30–115)
ALT FLD-CCNC: 193 U/L — HIGH (ref 0–41)
ANION GAP SERPL CALC-SCNC: 11 MMOL/L — SIGNIFICANT CHANGE UP (ref 7–14)
AST SERPL-CCNC: 109 U/L — HIGH (ref 0–41)
B BURGDOR AB SER QL IA: NEGATIVE — SIGNIFICANT CHANGE UP
B MICROTI IGG TITR SER: SIGNIFICANT CHANGE UP TITER
BASOPHILS # BLD AUTO: 0.03 K/UL — SIGNIFICANT CHANGE UP (ref 0–0.2)
BASOPHILS NFR BLD AUTO: 0.4 % — SIGNIFICANT CHANGE UP (ref 0–1)
BILIRUB SERPL-MCNC: 0.4 MG/DL — SIGNIFICANT CHANGE UP (ref 0.2–1.2)
BUN SERPL-MCNC: 6 MG/DL — LOW (ref 10–20)
CALCIUM SERPL-MCNC: 8.2 MG/DL — LOW (ref 8.4–10.5)
CHLORIDE SERPL-SCNC: 104 MMOL/L — SIGNIFICANT CHANGE UP (ref 98–110)
CO2 SERPL-SCNC: 25 MMOL/L — SIGNIFICANT CHANGE UP (ref 17–32)
CREAT SERPL-MCNC: <0.5 MG/DL — LOW (ref 0.7–1.5)
E CHAFFEENSIS IGG TITR SER IF: SIGNIFICANT CHANGE UP TITER
EGFR: 123 ML/MIN/1.73M2 — SIGNIFICANT CHANGE UP
EOSINOPHIL # BLD AUTO: 0.02 K/UL — SIGNIFICANT CHANGE UP (ref 0–0.7)
EOSINOPHIL NFR BLD AUTO: 0.3 % — SIGNIFICANT CHANGE UP (ref 0–8)
GLUCOSE SERPL-MCNC: 99 MG/DL — SIGNIFICANT CHANGE UP (ref 70–99)
HCT VFR BLD CALC: 37.2 % — SIGNIFICANT CHANGE UP (ref 37–47)
HGB BLD-MCNC: 12.6 G/DL — SIGNIFICANT CHANGE UP (ref 12–16)
IMM GRANULOCYTES NFR BLD AUTO: 0.8 % — HIGH (ref 0.1–0.3)
LYMPHOCYTES # BLD AUTO: 4.16 K/UL — HIGH (ref 1.2–3.4)
LYMPHOCYTES # BLD AUTO: 53.1 % — HIGH (ref 20.5–51.1)
MAGNESIUM SERPL-MCNC: 2.2 MG/DL — SIGNIFICANT CHANGE UP (ref 1.8–2.4)
MCHC RBC-ENTMCNC: 30.6 PG — SIGNIFICANT CHANGE UP (ref 27–31)
MCHC RBC-ENTMCNC: 33.9 G/DL — SIGNIFICANT CHANGE UP (ref 32–37)
MCV RBC AUTO: 90.3 FL — SIGNIFICANT CHANGE UP (ref 81–99)
MONOCYTES # BLD AUTO: 1.17 K/UL — HIGH (ref 0.1–0.6)
MONOCYTES NFR BLD AUTO: 14.9 % — HIGH (ref 1.7–9.3)
NEUTROPHILS # BLD AUTO: 2.39 K/UL — SIGNIFICANT CHANGE UP (ref 1.4–6.5)
NEUTROPHILS NFR BLD AUTO: 30.5 % — LOW (ref 42.2–75.2)
NRBC # BLD: 0 /100 WBCS — SIGNIFICANT CHANGE UP (ref 0–0)
PLATELET # BLD AUTO: 271 K/UL — SIGNIFICANT CHANGE UP (ref 130–400)
PMV BLD: 11.1 FL — HIGH (ref 7.4–10.4)
POTASSIUM SERPL-MCNC: 3.9 MMOL/L — SIGNIFICANT CHANGE UP (ref 3.5–5)
POTASSIUM SERPL-SCNC: 3.9 MMOL/L — SIGNIFICANT CHANGE UP (ref 3.5–5)
PROT SERPL-MCNC: 6.4 G/DL — SIGNIFICANT CHANGE UP (ref 6–8)
RBC # BLD: 4.12 M/UL — LOW (ref 4.2–5.4)
RBC # FLD: 15.5 % — HIGH (ref 11.5–14.5)
SODIUM SERPL-SCNC: 140 MMOL/L — SIGNIFICANT CHANGE UP (ref 135–146)
WBC # BLD: 7.83 K/UL — SIGNIFICANT CHANGE UP (ref 4.8–10.8)
WBC # FLD AUTO: 7.83 K/UL — SIGNIFICANT CHANGE UP (ref 4.8–10.8)

## 2023-07-30 PROCEDURE — 99232 SBSQ HOSP IP/OBS MODERATE 35: CPT

## 2023-07-30 RX ADMIN — ATOVAQUONE 750 MILLIGRAM(S): 750 SUSPENSION ORAL at 06:09

## 2023-07-30 RX ADMIN — PANTOPRAZOLE SODIUM 40 MILLIGRAM(S): 20 TABLET, DELAYED RELEASE ORAL at 06:08

## 2023-07-30 RX ADMIN — ENOXAPARIN SODIUM 40 MILLIGRAM(S): 100 INJECTION SUBCUTANEOUS at 06:09

## 2023-07-30 RX ADMIN — Medication 100 MILLIGRAM(S): at 06:09

## 2023-07-30 RX ADMIN — Medication 100 MILLIGRAM(S): at 17:32

## 2023-07-30 NOTE — PROGRESS NOTE ADULT - ASSESSMENT
48 y/o female w/ pmhx of postcholecyystectomy and UTI presenting with fever, body aches, cough, vomiting, headache and SOB for 5 days. Admitted for transaminitis and fever.     A/P:   Fever  Acute Transaminitis:   Thrombocytopenia: improved.   Patient with diarrhea, body aches, headache and fever.   labs showed elevated LFTs , , Alk P 348, now improving  Abdomen US normal, CT chest, abdomen and pelvis showed no acute pathology.   infectious work up so far negative, includes EBV, CMV, HCV, HBV, HIV, HSV, Babesia, RVP,   Pending: Anaplasma, tick borne panel, leptospirosis   Fever is improving.   On Atovaquone Azithromycin and Doxycycline, can stop Zithromax and Atovaquone as Babesia is negative ID follow up.       DVT PPX: heparin SubQ  Code: Full code  #Progress Note Handoff  Pending (specify): tick born workup, ID follow up.   Family discussion: spoke to patient and daughter at bedside  Disposition: likely home in 24 hrs.

## 2023-07-30 NOTE — PROGRESS NOTE ADULT - SUBJECTIVE AND OBJECTIVE BOX
CAMILA BENNETT  47y  Female      Patient is a 47y old  Female who presents with a chief complaint of fever in setting of elevated LFTs (29 Jul 2023 13:42)      INTERVAL HPI/OVERNIGHT EVENTS:  She feels better, fever improved, she had mild headache.   Vital Signs Last 24 Hrs  T(C): 35.6 (30 Jul 2023 07:49), Max: 35.9 (29 Jul 2023 15:48)  T(F): 96.1 (30 Jul 2023 07:49), Max: 96.6 (29 Jul 2023 15:48)  HR: 80 (30 Jul 2023 07:49) (79 - 80)  BP: 120/71 (30 Jul 2023 07:49) (120/71 - 139/90)  BP(mean): --  RR: 18 (30 Jul 2023 07:49) (18 - 18)  SpO2: 98% (30 Jul 2023 07:49) (98% - 98%)    Parameters below as of 30 Jul 2023 07:49  Patient On (Oxygen Delivery Method): room air          07-29-23 @ 07:01  -  07-30-23 @ 07:00  --------------------------------------------------------  IN: 480 mL / OUT: 0 mL / NET: 480 mL            Consultant(s) Notes Reviewed:  [x ] YES  [ ] NO          MEDICATIONS  (STANDING):  bismuth subsalicylate Liquid 525 milliGRAM(s) Oral once  diatrizoate meglumine/diatrizoate sodium. 30 milliLiter(s) Oral once  doxycycline IVPB 100 milliGRAM(s) IV Intermittent two times a day  enoxaparin Injectable 40 milliGRAM(s) SubCutaneous every 24 hours  pantoprazole    Tablet 40 milliGRAM(s) Oral before breakfast    MEDICATIONS  (PRN):  acetaminophen     Tablet .. 650 milliGRAM(s) Oral every 6 hours PRN Temp greater or equal to 38C (100.4F), Moderate Pain (4 - 6)      LABS                          12.6   7.83  )-----------( 271      ( 30 Jul 2023 07:29 )             37.2     07-30    140  |  104  |  6<L>  ----------------------------<  99  3.9   |  25  |  <0.5<L>    Ca    8.2<L>      30 Jul 2023 07:29  Mg     2.2     07-30    TPro  6.4  /  Alb  3.2<L>  /  TBili  0.4  /  DBili  x   /  AST  109<H>  /  ALT  193<H>  /  AlkPhos  260<H>  07-30      Urinalysis Basic - ( 30 Jul 2023 07:29 )    Color: x / Appearance: x / SG: x / pH: x  Gluc: 99 mg/dL / Ketone: x  / Bili: x / Urobili: x   Blood: x / Protein: x / Nitrite: x   Leuk Esterase: x / RBC: x / WBC x   Sq Epi: x / Non Sq Epi: x / Bacteria: x        Lactate Trend  07-26 @ 21:57 Lactate:1.4         CAPILLARY BLOOD GLUCOSE          Babesia microti PCR, Bld (collected 07-28-23 @ 07:37)    Culture - Blood (collected 07-26-23 @ 21:57)  Source: .Blood None  Preliminary Report (07-30-23 @ 02:01):    No growth at 72 Hours        RADIOLOGY & ADDITIONAL TESTS:    Imaging Personally Reviewed:  [ ] YES  [ ] NO    HEALTH ISSUES - PROBLEM Dx:          PHYSICAL EXAM:  GENERAL: NAD, well-developed.  HEAD:  Atraumatic, Normocephalic.  EYES: EOMI, PERRLA, conjunctiva and sclera clear.  NECK: Supple, No JVD.  CHEST/LUNG: Clear to auscultation bilaterally; No wheeze.  HEART: Regular rate and rhythm; S1 S2.   ABDOMEN: Soft, Nontender, Nondistended; Bowel sounds present.  EXTREMITIES:  2+ Peripheral Pulses, No clubbing, cyanosis, or edema.  PSYCH: AAOx3.  NEUROLOGY: non-focal.  SKIN: No rashes or lesions.

## 2023-07-31 LAB
ALBUMIN SERPL ELPH-MCNC: 3.3 G/DL — LOW (ref 3.5–5.2)
ALBUMIN SERPL ELPH-MCNC: 3.3 G/DL — LOW (ref 3.5–5.2)
ALP SERPL-CCNC: 312 U/L — HIGH (ref 30–115)
ALP SERPL-CCNC: 317 U/L — HIGH (ref 30–115)
ALT FLD-CCNC: 322 U/L — HIGH (ref 0–41)
ALT FLD-CCNC: 327 U/L — HIGH (ref 0–41)
ANION GAP SERPL CALC-SCNC: 8 MMOL/L — SIGNIFICANT CHANGE UP (ref 7–14)
AST SERPL-CCNC: 233 U/L — HIGH (ref 0–41)
AST SERPL-CCNC: 249 U/L — HIGH (ref 0–41)
BASOPHILS # BLD AUTO: 0.06 K/UL — SIGNIFICANT CHANGE UP (ref 0–0.2)
BASOPHILS NFR BLD AUTO: 1 % — SIGNIFICANT CHANGE UP (ref 0–1)
BILIRUB DIRECT SERPL-MCNC: 0.2 MG/DL — SIGNIFICANT CHANGE UP (ref 0–0.3)
BILIRUB INDIRECT FLD-MCNC: 0.1 MG/DL — LOW (ref 0.2–1.2)
BILIRUB SERPL-MCNC: 0.3 MG/DL — SIGNIFICANT CHANGE UP (ref 0.2–1.2)
BILIRUB SERPL-MCNC: 0.4 MG/DL — SIGNIFICANT CHANGE UP (ref 0.2–1.2)
BUN SERPL-MCNC: 9 MG/DL — LOW (ref 10–20)
CALCIUM SERPL-MCNC: 9 MG/DL — SIGNIFICANT CHANGE UP (ref 8.4–10.4)
CHLORIDE SERPL-SCNC: 101 MMOL/L — SIGNIFICANT CHANGE UP (ref 98–110)
CO2 SERPL-SCNC: 28 MMOL/L — SIGNIFICANT CHANGE UP (ref 17–32)
CREAT SERPL-MCNC: 0.5 MG/DL — LOW (ref 0.7–1.5)
EGFR: 116 ML/MIN/1.73M2 — SIGNIFICANT CHANGE UP
EOSINOPHIL # BLD AUTO: 0.03 K/UL — SIGNIFICANT CHANGE UP (ref 0–0.7)
EOSINOPHIL NFR BLD AUTO: 0.5 % — SIGNIFICANT CHANGE UP (ref 0–8)
GLUCOSE SERPL-MCNC: 115 MG/DL — HIGH (ref 70–99)
HCT VFR BLD CALC: 37.1 % — SIGNIFICANT CHANGE UP (ref 37–47)
HGB BLD-MCNC: 12.6 G/DL — SIGNIFICANT CHANGE UP (ref 12–16)
IMM GRANULOCYTES NFR BLD AUTO: 1.3 % — HIGH (ref 0.1–0.3)
LYMPHOCYTES # BLD AUTO: 3.91 K/UL — HIGH (ref 1.2–3.4)
LYMPHOCYTES # BLD AUTO: 64.3 % — HIGH (ref 20.5–51.1)
MAGNESIUM SERPL-MCNC: 2 MG/DL — SIGNIFICANT CHANGE UP (ref 1.8–2.4)
MCHC RBC-ENTMCNC: 30.4 PG — SIGNIFICANT CHANGE UP (ref 27–31)
MCHC RBC-ENTMCNC: 34 G/DL — SIGNIFICANT CHANGE UP (ref 32–37)
MCV RBC AUTO: 89.4 FL — SIGNIFICANT CHANGE UP (ref 81–99)
MONOCYTES # BLD AUTO: 0.42 K/UL — SIGNIFICANT CHANGE UP (ref 0.1–0.6)
MONOCYTES NFR BLD AUTO: 6.9 % — SIGNIFICANT CHANGE UP (ref 1.7–9.3)
NEUTROPHILS # BLD AUTO: 1.58 K/UL — SIGNIFICANT CHANGE UP (ref 1.4–6.5)
NEUTROPHILS NFR BLD AUTO: 26 % — LOW (ref 42.2–75.2)
NRBC # BLD: 0 /100 WBCS — SIGNIFICANT CHANGE UP (ref 0–0)
PLATELET # BLD AUTO: 341 K/UL — SIGNIFICANT CHANGE UP (ref 130–400)
PMV BLD: 10.6 FL — HIGH (ref 7.4–10.4)
POTASSIUM SERPL-MCNC: 4.5 MMOL/L — SIGNIFICANT CHANGE UP (ref 3.5–5)
POTASSIUM SERPL-SCNC: 4.5 MMOL/L — SIGNIFICANT CHANGE UP (ref 3.5–5)
PROT SERPL-MCNC: 6.4 G/DL — SIGNIFICANT CHANGE UP (ref 6–8)
PROT SERPL-MCNC: 6.8 G/DL — SIGNIFICANT CHANGE UP (ref 6–8)
RBC # BLD: 4.15 M/UL — LOW (ref 4.2–5.4)
RBC # FLD: 15.4 % — HIGH (ref 11.5–14.5)
SODIUM SERPL-SCNC: 137 MMOL/L — SIGNIFICANT CHANGE UP (ref 135–146)
WBC # BLD: 6.08 K/UL — SIGNIFICANT CHANGE UP (ref 4.8–10.8)
WBC # FLD AUTO: 6.08 K/UL — SIGNIFICANT CHANGE UP (ref 4.8–10.8)

## 2023-07-31 PROCEDURE — 99233 SBSQ HOSP IP/OBS HIGH 50: CPT

## 2023-07-31 PROCEDURE — 99232 SBSQ HOSP IP/OBS MODERATE 35: CPT

## 2023-07-31 RX ADMIN — Medication 650 MILLIGRAM(S): at 13:51

## 2023-07-31 RX ADMIN — Medication 650 MILLIGRAM(S): at 15:36

## 2023-07-31 RX ADMIN — Medication 100 MILLIGRAM(S): at 06:01

## 2023-07-31 RX ADMIN — PANTOPRAZOLE SODIUM 40 MILLIGRAM(S): 20 TABLET, DELAYED RELEASE ORAL at 06:00

## 2023-07-31 RX ADMIN — Medication 100 MILLIGRAM(S): at 17:23

## 2023-07-31 NOTE — PROGRESS NOTE ADULT - SUBJECTIVE AND OBJECTIVE BOX
Gastroenterology progress note:     Patient is a 47y old  Female who presents with a chief complaint of fever in setting of elevated LFTs (31 Jul 2023 14:04)       Admitted on: 07-26-23    We are following the patient for elevated liver enzymes      Interval History:  denies any abdominal pain, nausea, vomiting   feeling better        PAST MEDICAL & SURGICAL HISTORY:  Pyelitis      Urinary tract infection      History of cholecystectomy          MEDICATIONS  (STANDING):  bismuth subsalicylate Liquid 525 milliGRAM(s) Oral once  diatrizoate meglumine/diatrizoate sodium. 30 milliLiter(s) Oral once  doxycycline monohydrate Capsule 100 milliGRAM(s) Oral every 12 hours  enoxaparin Injectable 40 milliGRAM(s) SubCutaneous every 24 hours  pantoprazole    Tablet 40 milliGRAM(s) Oral before breakfast    MEDICATIONS  (PRN):  acetaminophen     Tablet .. 650 milliGRAM(s) Oral every 6 hours PRN Temp greater or equal to 38C (100.4F), Moderate Pain (4 - 6)      Allergies  No Known Allergies      Review of Systems:   Cardiovascular:  No Chest Pain, No Palpitations  Respiratory:  No Cough, No Dyspnea  Gastrointestinal:  As described in HPI    Physical Examination:  T(C): 36.1 (07-31-23 @ 15:33), Max: 36.2 (07-31-23 @ 08:26)  HR: 80 (07-31-23 @ 15:33) (75 - 80)  BP: 139/88 (07-31-23 @ 15:33) (139/88 - 152/80)  RR: 20 (07-31-23 @ 15:33) (18 - 20)  SpO2: 98% (07-31-23 @ 08:26) (98% - 98%)      Constitutional: No acute distress.  Respiratory:  No signs of respiratory distress. Lung sounds are clear bilaterally.  Cardiovascular:  S1 S2, Regular rate and rhythm.  Abdominal: Abdomen is soft, symmetric, and non-tender without distention.         Data:                        12.6   6.08  )-----------( 341      ( 31 Jul 2023 07:07 )             37.1     Hgb trend:  12.6  07-31-23 @ 07:07  12.6  07-30-23 @ 07:29  11.5  07-29-23 @ 08:12        07-31    137  |  101  |  9<L>  ----------------------------<  115<H>  4.5   |  28  |  0.5<L>    Ca    9.0      31 Jul 2023 07:07  Mg     2.0     07-31    TPro  6.4  /  Alb  3.3<L>  /  TBili  0.3  /  DBili  0.2  /  AST  233<H>  /  ALT  327<H>  /  AlkPhos  312<H>  07-31    Liver panel trend:  TBili 0.3   /      /      /   AlkP 312   /   Tptn 6.4   /   Alb 3.3    /   DBili 0.2      07-31  TBili 0.4   /      /      /   AlkP 317   /   Tptn 6.8   /   Alb 3.3    /   DBili --      07-31  TBili 0.4   /      /      /   AlkP 260   /   Tptn 6.4   /   Alb 3.2    /   DBili --      07-30  TBili 0.3   /   AST 65   /      /   AlkP 202   /   Tptn 5.6   /   Alb 2.8    /   DBili --      07-29  TBili 0.5   /      /      /   AlkP 239   /   Tptn 5.4   /   Alb 2.8    /   DBili --      07-28  TBili 0.6   /      /      /   AlkP 287   /   Tptn 5.6   /   Alb 3.0    /   DBili --      07-27  TBili 0.7   /      /      /   AlkP 314   /   Tptn 5.9   /   Alb 3.2    /   DBili --      07-27  TBili 0.8   /      /      /   AlkP 280   /   Tptn 5.7   /   Alb 3.1    /   DBili 0.5      07-26  TBili 0.7   /      /      /   AlkP 348   /   Tptn 6.7   /   Alb 3.6    /   DBili --      07-26  TBili 0.4   /      /      /   AlkP 169   /   Tptn 6.4   /   Alb 3.7    /   DBili --      07-22             Radiology:

## 2023-07-31 NOTE — PROGRESS NOTE ADULT - ATTENDING COMMENTS
47 y o  F admitted with fever headache and has thrombocytopenia, lymphopenia and elevated liver tests.  No hx of travel recently. No tick bite or out door activity other than being gardening for past few weeks.   Much better. Headache and fever resolved.   Alert  Not icteric  Abdomen patulous soft non tender   EBV CMV Herpes babesia and Lyme's serology / PCR do not suggest acute infection  CT abdomen no hepatobiliary pathology .  Acute febrile illness with thrombocytopenia, lymphopenia and elevated liver tests probable Anaplasma infection  Hepatitis A B C negative  Note ID input - anaplasma Ig G + and on doxycycline 47 y o  F admitted with fever headache and has thrombocytopenia, lymphopenia and elevated liver tests.  No hx of travel recently. No tick bite or out door activity other than being gardening for past few weeks.   Much better. Headache and fever resolved.   Alert  Not icteric  Abdomen patulous soft non tender  EBV CMV Herpes babesia and Lyme's serology / PCR do not suggest acute infection  Hepatitis A B C negative  CT abdomen no hepatobiliary pathology     Acute febrile illness with thrombocytopenia, lymphopenia and elevated liver tests probable Anaplasma infection  Mixed picture of liver injury with anaplasma and antibiotics.  Liver enzymes still peaking. ALP in 300s and AST ALT in 200s. Can be discharged for OP follow up.  Note ID input - anaplasma Ig G + and on doxycycline

## 2023-07-31 NOTE — PROGRESS NOTE ADULT - ASSESSMENT
46 y/o female w/ pmhx of postcholecyystectomy and UTI presenting with fever, body aches, cough, vomiting, headache and SOB for 5 days. Admitted for transaminitis and fever.     A/P:   Fever  Acute Transaminitis:   Thrombocytopenia: improved.   Patient with diarrhea, body aches, headache and fever. now improved   labs showed elevated LFTs , , Alk P 348, now improving  Abdomen US normal, CT chest, abdomen and pelvis showed no acute pathology.   infectious work up so far negative, includes EBV, CMV, HCV, HBV, HIV, HSV, Babesia, RVP,   Pending: Anaplasma, tick borne panel, leptospirosis   Fever is improving.   was On Atovaquone Azithromycin and Doxycycline,  Zithromax and Atovaquone was stopped as Babesia is negative ID follow up.   as per ID   - clinically improving --- Anaplasma IgG serologies are positive -- continue doxycycline 100 mg BID to complete 10 day course  - follow-up anaplasma PCR   - follow-up leptospirosis -ab   - trend LFTs, will need follow-up as outpatient       DVT PPX: heparin SubQ  Code: Full code  #Progress Note Handoff  Pending (specify): tick born workup, ID follow up.   Family discussion: spoke to patient and daughter at bedside      follow up: monitor LFT, not much improvement than yesterday , monitor for fever spikes. also need op follow up with hepatology as well . if persistent elevation of lfts, follow upwith hepatology  46 y/o female w/ pmhx of postcholecyystectomy and UTI presenting with fever, body aches, cough, vomiting, headache and SOB for 5 days. Admitted for transaminitis and fever.     A/P:   Fever  Acute Transaminitis:   Thrombocytopenia: improved.   Patient with diarrhea, body aches, headache and fever. now improved   labs showed elevated LFTs , , Alk P 348, now improving  Abdomen US normal, CT chest, abdomen and pelvis showed no acute pathology.   infectious work up so far negative, includes EBV, CMV, HCV, HBV, HIV, HSV, Babesia, RVP,   Pending: Anaplasma, tick borne panel, leptospirosis   Fever is improving.   was On Atovaquone Azithromycin and Doxycycline,  Zithromax and Atovaquone was stopped as Babesia is negative ID follow up.   as per ID   - clinically improving --- Anaplasma IgG serologies are positive -- continue doxycycline 100 mg BID to complete 10 day course  - follow-up anaplasma PCR   - follow-up leptospirosis -ab   - trend LFTs, will need follow-up as outpatient       DVT PPX: heparin SubQ  Code: Full code  #Progress Note Handoff  Pending (specify): tick born workup, ID follow up.   Family discussion: spoke to patient and daughter at bedside      follow up: monitor LFT, not much improvement than yesterday , monitor for fever spikes. also need op follow up with hepatology as well . if persistent elevation of lfts, follow upwith hepatology     addendum: [facility] clinic appointment, 8/16 1:30 Dr. Rizvi

## 2023-07-31 NOTE — PROGRESS NOTE ADULT - ASSESSMENT
48 y/o female w/ PMHx of postcholecystectomy and UTI presenting with fever, body aches, cough, vomiting, headache and SOB for 5 days and admitted for transaminitis and fever.     #Anaplasma  #Transaminitis  #Headache  #Fever   - CT A/P (7/27): No acute intra-abdominal or pelvic pathology.  - CT Chest(7/27):  Atelectasis scarring in right lower lobe. Bibasilar atelectasis (most pronounced left lower lobe). 3 mm solid nodule in the left upper lobe (6-17).  - RUQ US unremarkable   - elevated LFTs on admission  - LDH and Haptoglobin elevated  - acetaminophen level wnl  - Blood Cxs: NGTD  - CRP and Procal elevated  - Lipase, lactate wnl  - Lyme, Babesia, CMV, HIV, Hep panel/Hep B, HSV2, RVP, Legionella all negative; Positive for past EBV and HSV1  - Hepatology rec appreciated    - c/w Doxy  - c/w Tylenol PRN for pain and fever  - Monitoring LFTs daily (currently uptrending)  - Monitor for fever spikes  - F/u Ehrlichia/anaplasma, West Nile, Legionella, Leptospirosis  - ID following: Question of LP -- this may be related to tick born illness -- Fever curve potentially improving -- if persistent fevers into Sunday, can give acyclovir 10 mg/kg q 8 hours and woul need LP at that point, although will wait for tick born work-up       #Hyponatremia  - s/p NS 100ml/hr    DVT ppx: Lovenox  GI ppx: Protonix  Diet: regular  Activity: ambulate as tolerated  Code: Full  Dispo: acute   48 y/o female w/ PMHx of postcholecystectomy and UTI presenting with fever, body aches, cough, vomiting, headache and SOB for 5 days and admitted for transaminitis and fever.     #Anaplasma  #Transaminitis  #Headache  #Fever   - CT A/P (7/27): No acute intra-abdominal or pelvic pathology.  - CT Chest(7/27):  Atelectasis scarring in right lower lobe. Bibasilar atelectasis (most pronounced left lower lobe). 3 mm solid nodule in the left upper lobe (6-17).  - RUQ US unremarkable   - elevated LFTs on admission  - Anaplasma positive  - LDH and Haptoglobin elevated  - Blood Cxs: NGTD  - CRP and Procal elevated  - Lipase, lactate wnl  - Lyme, Babesia, CMV, HIV, Hep panel/Hep B, HSV2, RVP, Legionella all negative; Positive for past EBV and HSV1  - c/w Doxy  - c/w Tylenol PRN for pain and fever  - Monitoring LFTs daily (currently uptrending)  - Monitor for fever spikes  - Hepatology rec appreciated    - ID following      #Hyponatremia  - s/p NS 100ml/hr    DVT ppx: Lovenox  GI ppx: Protonix  Diet: regular  Activity: ambulate as tolerated  Code: Full  Dispo: acute   48 y/o female w/ PMHx of postcholecystectomy and UTI presenting with fever, body aches, cough, vomiting, headache and SOB for 5 days and admitted for transaminitis and fever.     #Anaplasma  #Transaminitis  #Headache  #Fever   - CT A/P (7/27): No acute intra-abdominal or pelvic pathology.  - CT Chest(7/27):  Atelectasis scarring in right lower lobe. Bibasilar atelectasis (most pronounced left lower lobe). 3 mm solid nodule in the left upper lobe (6-17).  - RUQ US unremarkable   - elevated LFTs on admission  - Anaplasma positive  - LDH and Haptoglobin elevated  - Blood Cxs: NGTD  - CRP and Procal elevated  - Lipase, lactate wnl  - Lyme, Babesia, CMV, HIV, Hep panel/Hep B, HSV2, RVP, Legionella all negative; Positive for past EBV and HSV1  - Switch IV Doxy to oral (10 day doxy course 7/27-8/6)  - c/w Tylenol PRN for pain and fever  - Monitoring LFTs daily (currently uptrending)  - Monitor for fever spikes  - Hepatology rec appreciated    - ID following      #Hyponatremia  - s/p NS 100ml/hr    DVT ppx: Lovenox  GI ppx: Protonix  Diet: regular  Activity: ambulate as tolerated  Code: Full  Dispo: acute   48 y/o female w/ PMHx of postcholecystectomy and UTI presenting with fever, body aches, cough, vomiting, headache and SOB for 5 days and admitted for transaminitis and fever.     #Anaplasma  #Transaminitis  #Headache  #Fever   - CT A/P (7/27): No acute intra-abdominal or pelvic pathology.  - CT Chest(7/27):  Atelectasis scarring in right lower lobe. Bibasilar atelectasis (most pronounced left lower lobe). 3 mm solid nodule in the left upper lobe (6-17).  - RUQ US unremarkable   - elevated LFTs on admission  - Anaplasma positive  - LDH and Haptoglobin elevated  - Blood Cxs: NGTD  - CRP and Procal elevated  - Lipase, lactate wnl  - Lyme, Babesia, CMV, HIV, Hep panel/Hep B, HSV2, RVP, Legionella all negative; Positive for past EBV and HSV1  - Switch IV Doxy to oral (10 day doxy course 7/27-8/6)  - c/w Tylenol PRN for pain and fever  - Monitoring LFTs daily (currently uptrending)  - Monitor for fever spikes  - Hepatology rec appreciated    - ID following    #Hyponatremia  - s/p NS 100ml/hr    #Lung nodule  - CT chest 7/27/23: 3 mm solid nodule in the left upper lobe  - f/u OP    DVT ppx: Lovenox  GI ppx: Protonix  Diet: regular  Activity: ambulate as tolerated  Code: Full  Dispo: acute   48 y/o female w/ PMHx of postcholecystectomy and UTI presenting with fever, body aches, cough, vomiting, headache and SOB for 5 days and admitted for transaminitis and fever.     #Anaplasma  #Transaminitis  #Headache  #Fever   - CT A/P (7/27): No acute intra-abdominal or pelvic pathology.  - CT Chest (7/27):  Atelectasis scarring in right lower lobe. Bibasilar atelectasis (most pronounced left lower lobe). 3 mm solid nodule in the left upper lobe (6-17).  - RUQ US unremarkable   - Elevated LFTs on admission  - Anemia and thrombocytopenia - resolved  - LDH and Haptoglobin elevated  - CRP and Procal elevated  - Lipase, lactate wnl  - Blood Cx: NGTD  - Anaplasma positive  - Lyme, Babesia, CMV, HIV, Hep panel/Hep B, HSV2, RVP, Legionella all negative; Positive for past EBV and HSV1  - Switch IV Doxy to oral (10 day doxy course 7/27-8/6)  - c/w Tylenol PRN for pain and fever  - Monitoring LFTs daily (currently uptrending)  - Monitor for fever spikes  - Hepatology rec appreciated; f/u OP  - ID following    #Hyponatremia - resolved  - s/p NS 100ml/hr    #Lung nodule  - CT chest 7/27/23: 3 mm solid nodule in the left upper lobe  - f/u OP    DVT ppx: Lovenox  GI ppx: Protonix  Diet: regular  Activity: ambulate as tolerated  Code: Full  Dispo: acute

## 2023-07-31 NOTE — PROGRESS NOTE ADULT - ASSESSMENT
ASSESSMENT  46 y/o F w/ pmhx pf cholecystectomy who presents with fever, sweats, chills, body aches, dry cough, headache, shortness of breath, and one episode of vomiting for 5 days.    IMPRESSION  #Anaplasma infection   - Anaplasma Phagocytophilum Ab, Ig:64 (23 @ 21:57)  - Lyme C6 negative  - Babesia PCR negative    #Transaminitis  - RUQ  - WNL, s/p cholecystectomy  - Acute Hepatitis serologies negative  - Monospot negative    #Leukopenia/Thrombcytopenia     RECOMMENDATIONS  - clinically improving --- Anaplasma IgG serologies are positive -- continue doxycycline 100 mg BID to complete 10 day course  - follow-up anaplasma PCR   - follow-up leptospirosis -ab   - trend LFTs, will need follow-up as outpatient     Please call or message on Microsoft Teams if with any questions.  Spectra [phone]

## 2023-07-31 NOTE — PROGRESS NOTE ADULT - SUBJECTIVE AND OBJECTIVE BOX
CAMILA BENNETT  47y  Female      Patient is a 47y old  Female who presents with a chief complaint of fever in setting of elevated LFTs (29 Jul 2023 13:42).       INTERVAL HPI/OVERNIGHT EVENTS:  She feels better, fever improved,   family at bedside     Vital Signs Last 24 Hrs  Vital Signs Last 24 Hrs  T(C): 36.2 (31 Jul 2023 08:26), Max: 36.2 (31 Jul 2023 08:26)  T(F): 97.2 (31 Jul 2023 08:26), Max: 97.2 (31 Jul 2023 08:26)  HR: 75 (31 Jul 2023 08:26) (75 - 81)  BP: 152/80 (31 Jul 2023 08:26) (150/83 - 152/80)  BP(mean): --  RR: 18 (31 Jul 2023 08:26) (18 - 18)  SpO2: 98% (31 Jul 2023 08:26) (98% - 98%)    Parameters below as of 31 Jul 2023 08:26  Patient On (Oxygen Delivery Method): room air            07-29-23 @ 07:01  -  07-30-23 @ 07:00  --------------------------------------------------------  IN: 480 mL / OUT: 0 mL / NET: 480 mL            Consultant(s) Notes Reviewed:  [x ] YES  [ ] NO          MEDICATIONS  (STANDING):  bismuth subsalicylate Liquid 525 milliGRAM(s) Oral once  diatrizoate meglumine/diatrizoate sodium. 30 milliLiter(s) Oral once  doxycycline IVPB 100 milliGRAM(s) IV Intermittent two times a day  enoxaparin Injectable 40 milliGRAM(s) SubCutaneous every 24 hours  pantoprazole    Tablet 40 milliGRAM(s) Oral before breakfast    MEDICATIONS  (PRN):  acetaminophen     Tablet .. 650 milliGRAM(s) Oral every 6 hours PRN Temp greater or equal to 38C (100.4F), Moderate Pain (4 - 6)      LABS                 LABS:                        12.6   6.08  )-----------( 341      ( 31 Jul 2023 07:07 )             37.1     07-31    137  |  101  |  9<L>  ----------------------------<  115<H>  4.5   |  28  |  0.5<L>    Ca    9.0      31 Jul 2023 07:07  Mg     2.0     07-31    TPro  6.4  /  Alb  3.3<L>  /  TBili  0.3  /  DBili  0.2  /  AST  233<H>  /  ALT  327<H>  /  AlkPhos  312<H>  07-31              Urinalysis Basic - ( 30 Jul 2023 07:29 )    Color: x / Appearance: x / SG: x / pH: x  Gluc: 99 mg/dL / Ketone: x  / Bili: x / Urobili: x   Blood: x / Protein: x / Nitrite: x   Leuk Esterase: x / RBC: x / WBC x   Sq Epi: x / Non Sq Epi: x / Bacteria: x        Lactate Trend  07-26 @ 21:57 Lactate:1.4         CAPILLARY BLOOD GLUCOSE          Babesia microti PCR, Bld (collected 07-28-23 @ 07:37)    Culture - Blood (collected 07-26-23 @ 21:57)  Source: .Blood None  Preliminary Report (07-30-23 @ 02:01):    No growth at 72 Hours        RADIOLOGY & ADDITIONAL TESTS:    Imaging Personally Reviewed:  [ ] YES  [ ] NO    HEALTH ISSUES - PROBLEM Dx:          PHYSICAL EXAM:  GENERAL: NAD, well-developed.  HEAD:  Atraumatic, Normocephalic.  EYES: EOMI, PERRLA, conjunctiva and sclera clear.  NECK: Supple, No JVD.  CHEST/LUNG: Clear to auscultation bilaterally; No wheeze.  HEART: Regular rate and rhythm; S1 S2.   ABDOMEN: Soft, Nontender, Nondistended; Bowel sounds present.  EXTREMITIES:  2+ Peripheral Pulses, No clubbing, cyanosis, or edema.  PSYCH: AAOx3.  NEUROLOGY: non-focal.  SKIN: No rashes or lesions.

## 2023-07-31 NOTE — PROGRESS NOTE ADULT - SUBJECTIVE AND OBJECTIVE BOX
SUBJECTIVE:    Patient is a 47y old Female who presents with a chief complaint of fever in setting of elevated LFTs (30 Jul 2023 11:43)    Currently admitted to medicine with the primary diagnosis of Transaminitis       Today is hospital day 5d. This morning she is resting comfortably in bed and reports no new issues or overnight events.     PAST MEDICAL & SURGICAL HISTORY  Pyelitis    Urinary tract infection    History of cholecystectomy      SOCIAL HISTORY:  Negative for smoking/alcohol/drug use.     ALLERGIES:  No Known Allergies    MEDICATIONS:  STANDING MEDICATIONS  bismuth subsalicylate Liquid 525 milliGRAM(s) Oral once  diatrizoate meglumine/diatrizoate sodium. 30 milliLiter(s) Oral once  doxycycline monohydrate Capsule 100 milliGRAM(s) Oral every 12 hours  enoxaparin Injectable 40 milliGRAM(s) SubCutaneous every 24 hours  pantoprazole    Tablet 40 milliGRAM(s) Oral before breakfast    PRN MEDICATIONS  acetaminophen     Tablet .. 650 milliGRAM(s) Oral every 6 hours PRN    VITALS:   T(F): 97.2  HR: 75  BP: 152/80  RR: 18  SpO2: 98%    LABS:                        12.6   6.08  )-----------( 341      ( 31 Jul 2023 07:07 )             37.1     07-31    137  |  101  |  9<L>  ----------------------------<  115<H>  4.5   |  28  |  0.5<L>    Ca    9.0      31 Jul 2023 07:07  Mg     2.0     07-31    TPro  6.8  /  Alb  3.3<L>  /  TBili  0.4  /  DBili  x   /  AST  249<H>  /  ALT  322<H>  /  AlkPhos  317<H>  07-31      Urinalysis Basic - ( 31 Jul 2023 07:07 )    Color: x / Appearance: x / SG: x / pH: x  Gluc: 115 mg/dL / Ketone: x  / Bili: x / Urobili: x   Blood: x / Protein: x / Nitrite: x   Leuk Esterase: x / RBC: x / WBC x   Sq Epi: x / Non Sq Epi: x / Bacteria: x                RADIOLOGY:    PHYSICAL EXAM:  GEN: No acute distress  LUNGS: Clear to auscultation bilaterally   HEART: S1/S2 present. RRR.   ABD: Soft, non-tender, non-distended. Bowel sounds present  EXT: NC/NC/NE/2+PP/RBOWN/Skin Intact.   NEURO: AAOX3    Intravenous access:   NG tube:   [place]uera Catheter:

## 2023-07-31 NOTE — PROGRESS NOTE ADULT - SUBJECTIVE AND OBJECTIVE BOX
CAMILA BENNETT  47y, Female  Allergy: No Known Allergies      LOS  5d    CHIEF COMPLAINT: fever in setting of elevated LFTs (31 Jul 2023 11:29)      INTERVAL EVENTS/HPI  - No acute events overnight  - T(F): , Max: 97.2 (07-31-23 @ 08:26)  - no further fevers over weekend - headaches improved   - WBC Count: 6.08 (07-31-23 @ 07:07)  WBC Count: 7.83 (07-30-23 @ 07:29)     - Creatinine: 0.5 (07-31-23 @ 07:07)  Creatinine: <0.5 (07-30-23 @ 07:29)       ROS  General: Denies rigors, nightsweats  HEENT: Denies headache, rhinorrhea, sore throat, eye pain  CV: Denies CP, palpitations  PULM: Denies wheezing, hemoptysis  GI: Denies hematemesis, hematochezia, melena  : Denies discharge, hematuria  MSK: Denies arthralgias, myalgias  SKIN: Denies rash, lesions  NEURO: Denies paresthesias, weakness  PSYCH: Denies depression, anxiety    VITALS:  T(F): 97.2, Max: 97.2 (07-31-23 @ 08:26)  HR: 75  BP: 152/80  RR: 18Vital Signs Last 24 Hrs  T(C): 36.2 (31 Jul 2023 08:26), Max: 36.2 (31 Jul 2023 08:26)  T(F): 97.2 (31 Jul 2023 08:26), Max: 97.2 (31 Jul 2023 08:26)  HR: 75 (31 Jul 2023 08:26) (75 - 81)  BP: 152/80 (31 Jul 2023 08:26) (150/83 - 152/80)  BP(mean): --  RR: 18 (31 Jul 2023 08:26) (18 - 18)  SpO2: 98% (31 Jul 2023 08:26) (98% - 98%)    Parameters below as of 31 Jul 2023 08:26  Patient On (Oxygen Delivery Method): room air        PHYSICAL EXAM:  Gen: NAD, resting in bed  HEENT: Normocephalic, atraumatic  Neck: supple, no lymphadenopathy  CV: Regular rate & regular rhythm  Lungs: decreased BS at bases, no fremitus  Abdomen: Soft, BS present  Ext: Warm, well perfused  Neuro: non focal, awake  Skin: no rash, no erythema  Lines: no phlebitis    FH: Non-contributory  Social Hx: Non-contributory    TESTS & MEASUREMENTS:                        12.6   6.08  )-----------( 341      ( 31 Jul 2023 07:07 )             37.1     07-31    137  |  101  |  9<L>  ----------------------------<  115<H>  4.5   |  28  |  0.5<L>    Ca    9.0      31 Jul 2023 07:07  Mg     2.0     07-31    TPro  6.4  /  Alb  3.3<L>  /  TBili  0.3  /  DBili  0.2  /  AST  233<H>  /  ALT  327<H>  /  AlkPhos  312<H>  07-31      LIVER FUNCTIONS - ( 31 Jul 2023 11:00 )  Alb: 3.3 g/dL / Pro: 6.4 g/dL / ALK PHOS: 312 U/L / ALT: 327 U/L / AST: 233 U/L / GGT: x           Urinalysis Basic - ( 31 Jul 2023 07:07 )    Color: x / Appearance: x / SG: x / pH: x  Gluc: 115 mg/dL / Ketone: x  / Bili: x / Urobili: x   Blood: x / Protein: x / Nitrite: x   Leuk Esterase: x / RBC: x / WBC x   Sq Epi: x / Non Sq Epi: x / Bacteria: x        Babesia microti PCR, Bld (collected 07-28-23 @ 07:37)    Culture - Blood (collected 07-26-23 @ 21:57)  Source: .Blood None  Preliminary Report (07-31-23 @ 02:00):    No growth at 4 days    Culture - Urine (collected 07-22-23 @ 18:25)  Source: Clean Catch Clean Catch (Midstream)  Final Report (07-24-23 @ 16:22):    >=3 organisms. Probable collection contamination.        Lactate, Blood: 1.4 mmol/L (07-26-23 @ 21:57)      INFECTIOUS DISEASES TESTING  Legionella Antigen, Urine: Negative (07-28-23 @ 18:56)  HIV-1/2 Combo Result: Nonreact (07-28-23 @ 07:37)  Procalcitonin, Serum: 0.35 (07-26-23 @ 21:57)  Rapid RVP Result: NotDetec (07-26-23 @ 15:33)      INFLAMMATORY MARKERS  C-Reactive Protein, Serum: 51.4 mg/L (07-26-23 @ 21:57)  Sedimentation Rate, Erythrocyte: 10 mm/Hr (07-26-23 @ 21:57)      RADIOLOGY & ADDITIONAL TESTS:  I have personally reviewed the last available Chest xray  CXR      CT      CARDIOLOGY TESTING  12 Lead ECG:   Ventricular Rate 113 BPM    Atrial Rate 113 BPM    P-R Interval 130 ms    QRS Duration 120 ms    Q-T Interval 322 ms    QTC Calculation(Bazett) 441 ms    P Axis 50 degrees    R Axis 4 degrees    T Axis 47 degrees    Diagnosis Line Sinus tachycardia  Right bundle branch block  Abnormal ECG    Confirmed by Tuan Rizvi ([id number]) on 7/27/2023 2:34:54 PM (07-27-23 @ 12:59)  12 Lead ECG:   Ventricular Rate 82 BPM    Atrial Rate 82 BPM    P-R Interval 140 ms    QRS Duration 120 ms    Q-T Interval 410 ms    QTC Calculation(Bazett) 479 ms    P Axis 16 degrees    R Axis 3 degrees    T Axis 5 degrees    Diagnosis Line Normal sinus rhythm  Right bundle branch block  Abnormal ECG    Confirmed by ROBERT VANEGAS MD ([id number]) on 7/26/2023 6:55:38 AM (07-26-23 @ 01:59)      MEDICATIONS  bismuth subsalicylate Liquid 525 Oral once  diatrizoate meglumine/diatrizoate sodium. 30 Oral once  doxycycline monohydrate Capsule 100 Oral every 12 hours  enoxaparin Injectable 40 SubCutaneous every 24 hours  pantoprazole    Tablet 40 Oral before breakfast      WEIGHT  Weight (kg): 68 (07-25-23 @ 23:46)  Creatinine: 0.5 mg/dL (07-31-23 @ 07:07)      ANTIBIOTICS:  doxycycline monohydrate Capsule 100 milliGRAM(s) Oral every 12 hours      All available historical records have been reviewed

## 2023-08-01 ENCOUNTER — TRANSCRIPTION ENCOUNTER (OUTPATIENT)
Age: 48
End: 2023-08-01

## 2023-08-01 VITALS
HEART RATE: 81 BPM | RESPIRATION RATE: 18 BRPM | SYSTOLIC BLOOD PRESSURE: 138 MMHG | OXYGEN SATURATION: 98 % | TEMPERATURE: 98 F | DIASTOLIC BLOOD PRESSURE: 78 MMHG

## 2023-08-01 LAB
A PHAGOCYTOPH DNA BLD QL NAA+PROBE: NEGATIVE — SIGNIFICANT CHANGE UP
ALBUMIN SERPL ELPH-MCNC: 3.4 G/DL — LOW (ref 3.5–5.2)
ALP SERPL-CCNC: 294 U/L — HIGH (ref 30–115)
ALT FLD-CCNC: 356 U/L — HIGH (ref 0–41)
ANION GAP SERPL CALC-SCNC: 12 MMOL/L — SIGNIFICANT CHANGE UP (ref 7–14)
AST SERPL-CCNC: 197 U/L — HIGH (ref 0–41)
BASOPHILS # BLD AUTO: 0.06 K/UL — SIGNIFICANT CHANGE UP (ref 0–0.2)
BASOPHILS NFR BLD AUTO: 0.9 % — SIGNIFICANT CHANGE UP (ref 0–1)
BILIRUB SERPL-MCNC: 0.4 MG/DL — SIGNIFICANT CHANGE UP (ref 0.2–1.2)
BUN SERPL-MCNC: 9 MG/DL — LOW (ref 10–20)
CALCIUM SERPL-MCNC: 8.5 MG/DL — SIGNIFICANT CHANGE UP (ref 8.4–10.5)
CHLORIDE SERPL-SCNC: 101 MMOL/L — SIGNIFICANT CHANGE UP (ref 98–110)
CO2 SERPL-SCNC: 25 MMOL/L — SIGNIFICANT CHANGE UP (ref 17–32)
CREAT SERPL-MCNC: <0.5 MG/DL — LOW (ref 0.7–1.5)
CULTURE RESULTS: SIGNIFICANT CHANGE UP
E CHAFFEENSIS DNA BLD QL NAA+PROBE: POSITIVE
E EWINGII DNA SPEC QL NAA+PROBE: NEGATIVE — SIGNIFICANT CHANGE UP
EGFR: 123 ML/MIN/1.73M2 — SIGNIFICANT CHANGE UP
EHRLICHIA DNA SPEC QL NAA+PROBE: NEGATIVE — SIGNIFICANT CHANGE UP
EOSINOPHIL # BLD AUTO: 0.03 K/UL — SIGNIFICANT CHANGE UP (ref 0–0.7)
EOSINOPHIL NFR BLD AUTO: 0.5 % — SIGNIFICANT CHANGE UP (ref 0–8)
GLUCOSE SERPL-MCNC: 100 MG/DL — HIGH (ref 70–99)
HCT VFR BLD CALC: 38.3 % — SIGNIFICANT CHANGE UP (ref 37–47)
HGB BLD-MCNC: 12.8 G/DL — SIGNIFICANT CHANGE UP (ref 12–16)
IMM GRANULOCYTES NFR BLD AUTO: 1.7 % — HIGH (ref 0.1–0.3)
LEPTOSPIRA AB TITR SER: NEGATIVE — SIGNIFICANT CHANGE UP
LYMPHOCYTES # BLD AUTO: 3.42 K/UL — HIGH (ref 1.2–3.4)
LYMPHOCYTES # BLD AUTO: 54 % — HIGH (ref 20.5–51.1)
MAGNESIUM SERPL-MCNC: 2.1 MG/DL — SIGNIFICANT CHANGE UP (ref 1.8–2.4)
MCHC RBC-ENTMCNC: 30.1 PG — SIGNIFICANT CHANGE UP (ref 27–31)
MCHC RBC-ENTMCNC: 33.4 G/DL — SIGNIFICANT CHANGE UP (ref 32–37)
MCV RBC AUTO: 90.1 FL — SIGNIFICANT CHANGE UP (ref 81–99)
MONOCYTES # BLD AUTO: 0.56 K/UL — SIGNIFICANT CHANGE UP (ref 0.1–0.6)
MONOCYTES NFR BLD AUTO: 8.8 % — SIGNIFICANT CHANGE UP (ref 1.7–9.3)
NEUTROPHILS # BLD AUTO: 2.15 K/UL — SIGNIFICANT CHANGE UP (ref 1.4–6.5)
NEUTROPHILS NFR BLD AUTO: 34.1 % — LOW (ref 42.2–75.2)
NRBC # BLD: 0 /100 WBCS — SIGNIFICANT CHANGE UP (ref 0–0)
PLATELET # BLD AUTO: 415 K/UL — HIGH (ref 130–400)
PMV BLD: 10.3 FL — SIGNIFICANT CHANGE UP (ref 7.4–10.4)
POTASSIUM SERPL-MCNC: 4.7 MMOL/L — SIGNIFICANT CHANGE UP (ref 3.5–5)
POTASSIUM SERPL-SCNC: 4.7 MMOL/L — SIGNIFICANT CHANGE UP (ref 3.5–5)
PROT SERPL-MCNC: 6.5 G/DL — SIGNIFICANT CHANGE UP (ref 6–8)
RBC # BLD: 4.25 M/UL — SIGNIFICANT CHANGE UP (ref 4.2–5.4)
RBC # FLD: 15.4 % — HIGH (ref 11.5–14.5)
SODIUM SERPL-SCNC: 138 MMOL/L — SIGNIFICANT CHANGE UP (ref 135–146)
SPECIMEN SOURCE: SIGNIFICANT CHANGE UP
WBC # BLD: 6.33 K/UL — SIGNIFICANT CHANGE UP (ref 4.8–10.8)
WBC # FLD AUTO: 6.33 K/UL — SIGNIFICANT CHANGE UP (ref 4.8–10.8)

## 2023-08-01 PROCEDURE — 99239 HOSP IP/OBS DSCHRG MGMT >30: CPT

## 2023-08-01 RX ADMIN — PANTOPRAZOLE SODIUM 40 MILLIGRAM(S): 20 TABLET, DELAYED RELEASE ORAL at 05:13

## 2023-08-01 RX ADMIN — Medication 100 MILLIGRAM(S): at 05:13

## 2023-08-01 NOTE — DISCHARGE NOTE PROVIDER - ATTENDING DISCHARGE PHYSICAL EXAMINATION:
Patient seen at bedside. discussed with family. patient wants to be discharged . family also wants patient to be discharged today. I advised to keep for one more day to check LFTs tomorrow but patient/family refused. patient was advised to follow up with PCP , ID and hepatology. patient and family understood and agreed with plan. advised to go to ED in case of any worsening symptoms. d/w hepatology cleared for discharge today with outpatient follow up.

## 2023-08-01 NOTE — DISCHARGE NOTE PROVIDER - NSDCFUADDAPPT_GEN_ALL_CORE_FT
Patient Please call dr. jon orantes, office. He is the liver specialist. make appt for within one week.     Please call dr erickson office for appt with Infectious disease for follow up of your suspected anaplasma infection.    You have a scheduled appt with Dr avalos internal medicine on 8/16 at 130pm, this will be your new primary  care provider. Please show up to the appt with a summary of your health history and medications you are on. Allergies, hospitalization history and surgery history.

## 2023-08-01 NOTE — DISCHARGE NOTE NURSING/CASE MANAGEMENT/SOCIAL WORK - NSDCFUADDAPPT_GEN_ALL_CORE_FT
Please call dr. jon orantes, office. He is the liver specialist. make appt for within one week.     Please call dr erickson office for appt with Infectious disease for follow up of your suspected anaplasma infection.    You have a scheduled appt with Dr avalos internal medicine on 8/16 at 130pm, this will be your new primary  care provider. Please show up to the appt with a summary of your health history and medications you are on. Allergies, hospitalization history and surgery history.

## 2023-08-01 NOTE — DISCHARGE NOTE PROVIDER - NSDCCPCAREPLAN_GEN_ALL_CORE_FT
PRINCIPAL DISCHARGE DIAGNOSIS  Diagnosis: Transaminitis  Assessment and Plan of Treatment: Your liver enzymes are high because of suspected tick borne illness and antibotics. You should follow up with the liver specialist in a week to get repeat blood tests to see how your lliver injury is progressing. Please call the liver specialist noted in the discharge paperwork. Make an appt for within one week.  Return to ED if you develop severe nausea, vomiting, abdominal pain, dizziness, malaise, chest pain or trouble breathing.      SECONDARY DISCHARGE DIAGNOSES  Diagnosis: Fever  Assessment and Plan of Treatment: Your fevers are likely due to suspected tickborne illness. You are completing a course of doxycycline and report improvement in symptoms and labs are getting better. You need to follow up with the ID doctor for management of this tick borne illness probably anaplasma. Please call the listed ID doctor for an appt.

## 2023-08-01 NOTE — DISCHARGE NOTE PROVIDER - NSDCFUSCHEDAPPT_GEN_ALL_CORE_FT
Luis Morgan  [facility] PreAdmits  Scheduled Appointment: 08/04/2023    [facility]  PODIATRY  [address]  Scheduled Appointment: 08/04/2023    Era Gramajo  [facility] PreAdmits  Scheduled Appointment: 08/16/2023    Karen Rizvi  [facility]  INTMED  [address]  Scheduled Appointment: 08/16/2023

## 2023-08-01 NOTE — DISCHARGE NOTE PROVIDER - PROVIDER TOKENS
PROVIDER:[TOKEN:[[id number]],FOLLOWUP:[1 week]],PROVIDER:[TOKEN:[[id number]],FOLLOWUP:[2 weeks]],PROVIDER:[TOKEN:[[id number]],SCHEDULEDAPPT:[08/16/2023],SCHEDULEDAPPTTIME:[01:30 PM]]

## 2023-08-01 NOTE — PROGRESS NOTE ADULT - ASSESSMENT
46 y/o female w/ PMHx of postcholecystectomy and UTI presenting with fever, body aches, cough, vomiting, headache and SOB for 5 days and admitted for transaminitis and fever.     #Anaplasma  #Transaminitis  #Headache  #Fever   - CT A/P (7/27): No acute intra-abdominal or pelvic pathology.  - CT Chest (7/27):  Atelectasis scarring in right lower lobe. Bibasilar atelectasis (most pronounced left lower lobe). 3 mm solid nodule in the left upper lobe (6-17).  - RUQ US unremarkable   - Elevated LFTs on admission  - Anemia and thrombocytopenia - resolved  - LDH and Haptoglobin elevated  - CRP and Procal elevated  - Lipase, lactate wnl  - Blood Cx: NGTD  - Anaplasma positive  - Lyme, Babesia, CMV, HIV, Hep panel/Hep B, HSV2, RVP, Legionella all negative; Positive for past EBV and HSV1  - Switch IV Doxy to oral (10 day doxy course 7/27-8/6)  - c/w Tylenol PRN for pain and fever  - Monitoring LFTs daily (currently uptrending)  - Monitor for fever spikes  - Hepatology rec appreciated; f/u OP  - ID following    #Hyponatremia - resolved  - s/p NS 100ml/hr    #Lung nodule  - CT chest 7/27/23: 3 mm solid nodule in the left upper lobe  - f/u OP    DVT ppx: Lovenox  GI ppx: Protonix  Diet: regular  Activity: ambulate as tolerated  Code: Full  Dispo: Anticipate discharge

## 2023-08-01 NOTE — DISCHARGE NOTE PROVIDER - HOSPITAL COURSE
47 year old female with hx of cholecystectomy p/w fevers, chills, sweats, bodyaches, dry cough, headache, sob and vomiting for five days. Main complaint is fevers and headaches. Labs significant for thrombocytopenia, leukopenia and transamnitis. Pt was worked up for suspected tick borne illness. Anaplasma igg came back positive and pt was empirically started on doxycycline by infectious disease. Pt reported improvement in symptoms and objectively had fevers abated after treatment. Pt will be discharged on oral doxycycline to complete course. O/p ID follow up. Pt also had transmainitis, hepatology was consulted, determined likely mixed picture liver injury secondary to anaplasma and abx. Will need o/p follow up.     pt vss, on RA, pending further improvement in LFTs but pt requesting to go home, aware of elevated LFTs and willl follow up closely with hepatology, cleared by hepatology for o/p follow up.

## 2023-08-01 NOTE — DISCHARGE NOTE PROVIDER - CARE PROVIDER_API CALL
Enoc Donovan  Internal Medicine  [address] [place] [address]  Phone: [phone]  Fax: [phone]  Follow Up Time: 1 week    Jose Alford  Infectious Disease  [address] [place] [address]  Phone: [phone]  Fax: [phone]  Follow Up Time: 2 weeks    Karen Rizvi  Internal Medicine  [address] [place] [address]  Phone: [phone]  Fax: [phone]  Scheduled Appointment: 08/16/2023 01:30 PM

## 2023-08-01 NOTE — PROGRESS NOTE ADULT - REASON FOR ADMISSION
fever in setting of elevated LFTs
Fever and Transaminitis
Fever plus Elevated LFTs
fever in setting of elevated LFTs

## 2023-08-01 NOTE — DISCHARGE NOTE NURSING/CASE MANAGEMENT/SOCIAL WORK - NSDCPEFALRISK_GEN_ALL_CORE
For information on Fall & Injury Prevention, visit: [email or web address] OR  [email or web address] OR  https://www.cdc.gov/steadi/patient.html Complex Repair And Double M Plasty Text: The defect edges were debeveled with a #15 scalpel blade.  The primary defect was closed partially with a complex linear closure.  Given the location of the remaining defect, shape of the defect and the proximity to free margins a double M plasty was deemed most appropriate for complete closure of the defect.  Using a sterile surgical marker, an appropriate advancement flap was drawn incorporating the defect and placing the expected incisions within the relaxed skin tension lines where possible.    The area thus outlined was incised deep to adipose tissue with a #15 scalpel blade.  The skin margins were undermined to an appropriate distance in all directions utilizing iris scissors.

## 2023-08-01 NOTE — PROGRESS NOTE ADULT - PROVIDER SPECIALTY LIST ADULT
Internal Medicine
Hepatology
Hospitalist
Internal Medicine
Hepatology
Hospitalist
Hospitalist
Internal Medicine
Hospitalist
Internal Medicine
Infectious Disease
Infectious Disease

## 2023-08-01 NOTE — PROGRESS NOTE ADULT - SUBJECTIVE AND OBJECTIVE BOX
SUBJECTIVE:    Patient is a 47y old Female who presents with a chief complaint of fever in setting of elevated LFTs (31 Jul 2023 18:18)    Currently admitted to medicine with the primary diagnosis of Transaminitis       Today is hospital day 6d. This morning she is resting comfortably in bed. Feeling much better    PAST MEDICAL & SURGICAL HISTORY  Pyelitis    Urinary tract infection    History of cholecystectomy      SOCIAL HISTORY:  Negative for smoking/alcohol/drug use.     ALLERGIES:  No Known Allergies    MEDICATIONS:  STANDING MEDICATIONS  bismuth subsalicylate Liquid 525 milliGRAM(s) Oral once  diatrizoate meglumine/diatrizoate sodium. 30 milliLiter(s) Oral once  doxycycline monohydrate Capsule 100 milliGRAM(s) Oral every 12 hours  enoxaparin Injectable 40 milliGRAM(s) SubCutaneous every 24 hours  pantoprazole    Tablet 40 milliGRAM(s) Oral before breakfast    PRN MEDICATIONS  acetaminophen     Tablet .. 650 milliGRAM(s) Oral every 6 hours PRN    VITALS:   T(F): 98.1  HR: 81  BP: 138/78  RR: 18  SpO2: 98%    LABS:                        12.8   6.33  )-----------( 415      ( 01 Aug 2023 05:41 )             38.3     08-01    138  |  101  |  9<L>  ----------------------------<  100<H>  4.7   |  25  |  <0.5<L>    Ca    8.5      01 Aug 2023 05:41  Mg     2.1     08-01    TPro  6.5  /  Alb  3.4<L>  /  TBili  0.4  /  DBili  x   /  AST  197<H>  /  ALT  356<H>  /  AlkPhos  294<H>  08-01      Urinalysis Basic - ( 01 Aug 2023 05:41 )    Color: x / Appearance: x / SG: x / pH: x  Gluc: 100 mg/dL / Ketone: x  / Bili: x / Urobili: x   Blood: x / Protein: x / Nitrite: x   Leuk Esterase: x / RBC: x / WBC x   Sq Epi: x / Non Sq Epi: x / Bacteria: x                RADIOLOGY:    PHYSICAL EXAM:  GEN: No acute distress  LUNGS: Clear to auscultation bilaterally   HEART: S1/S2 present. RRR.   ABD: Soft, non-tender, non-distended. Bowel sounds present  EXT: NC/NC/NE/2+PP/BROWN/Skin Intact.   NEURO: AAOX3    Intravenous access:   NG tube:   Noguera Catheter:

## 2023-08-04 ENCOUNTER — OUTPATIENT (OUTPATIENT)
Dept: OUTPATIENT SERVICES | Facility: HOSPITAL | Age: 48
LOS: 1 days | End: 2023-08-04
Payer: COMMERCIAL

## 2023-08-04 ENCOUNTER — APPOINTMENT (OUTPATIENT)
Dept: PODIATRY | Facility: CLINIC | Age: 48
End: 2023-08-04
Payer: COMMERCIAL

## 2023-08-04 DIAGNOSIS — Z90.49 ACQUIRED ABSENCE OF OTHER SPECIFIED PARTS OF DIGESTIVE TRACT: Chronic | ICD-10-CM

## 2023-08-04 DIAGNOSIS — M72.2 PLANTAR FASCIAL FIBROMATOSIS: ICD-10-CM

## 2023-08-04 DIAGNOSIS — Z00.00 ENCOUNTER FOR GENERAL ADULT MEDICAL EXAMINATION WITHOUT ABNORMAL FINDINGS: ICD-10-CM

## 2023-08-04 PROCEDURE — 99212 OFFICE O/P EST SF 10 MIN: CPT

## 2023-08-08 PROBLEM — M72.2 PLANTAR FASCIITIS OF LEFT FOOT: Status: ACTIVE | Noted: 2023-02-27

## 2023-08-08 NOTE — ASSESSMENT
[FreeTextEntry1] : resolution of heel pain encouraged to continue stretching exercises accommodative shoes return as needed

## 2023-08-08 NOTE — HISTORY OF PRESENT ILLNESS
[Sneakers] : alice [FreeTextEntry1] : 47 year old female presents with left plantar fascial pain with first step of the morning and pain improves with ambulation. Denies trauma/injury  3/23 returns with recurrent left heel pain  3/29 returns for follow up for left heel pain. notes 80% improvement from last visit   5/10/23 returns w/ L heel pain, has not improved much since last injection; pt does not want another injection;   5/26 returns for management of left heel pain. transfer of heel pain to the lateral and posterior left heel  8/4 returns for follow up. notes resolution of pain

## 2023-08-08 NOTE — PHYSICAL EXAM
[Ankle Swelling (On Exam)] : not present [Varicose Veins Of Lower Extremities] : not present [Delayed in the Right Toes] : capillary refills normal in right toes [Delayed in the Left Toes] : capillary refills normal in the left toes [2+] : left foot dorsalis pedis 2+ [Pes Planus] : pes planus deformity [de-identified] : no pain on palpation of the lateral band of the plantar fascia and posterior calcaneus, left foot   [Skin Color & Pigmentation] : normal skin color and pigmentation [Skin Turgor] : normal skin turgor [] : no rash [Skin Lesions] : no skin lesions [Foot Ulcer] : no foot ulcer [Skin Induration] : no skin induration [Diminished Throughout Right Foot] : normal sensation with monofilament testing throughout right foot [Diminished Throughout Left Foot] : normal sensation with monofilament testing throughout left foot

## 2023-08-09 DIAGNOSIS — M72.2 PLANTAR FASCIAL FIBROMATOSIS: ICD-10-CM

## 2023-08-10 DIAGNOSIS — Z87.440 PERSONAL HISTORY OF URINARY (TRACT) INFECTIONS: ICD-10-CM

## 2023-08-10 DIAGNOSIS — D72.819 DECREASED WHITE BLOOD CELL COUNT, UNSPECIFIED: ICD-10-CM

## 2023-08-10 DIAGNOSIS — A79.82 ANAPLASMOSIS [A. PHAGOCYTOPHILUM]: ICD-10-CM

## 2023-08-10 DIAGNOSIS — R51.9 HEADACHE, UNSPECIFIED: ICD-10-CM

## 2023-08-10 DIAGNOSIS — R74.01 ELEVATION OF LEVELS OF LIVER TRANSAMINASE LEVELS: ICD-10-CM

## 2023-08-10 DIAGNOSIS — Z28.310 UNVACCINATED FOR COVID-19: ICD-10-CM

## 2023-08-10 DIAGNOSIS — E87.1 HYPO-OSMOLALITY AND HYPONATREMIA: ICD-10-CM

## 2023-08-10 DIAGNOSIS — R91.1 SOLITARY PULMONARY NODULE: ICD-10-CM

## 2023-08-10 DIAGNOSIS — Z90.49 ACQUIRED ABSENCE OF OTHER SPECIFIED PARTS OF DIGESTIVE TRACT: ICD-10-CM

## 2023-08-10 DIAGNOSIS — A41.9 SEPSIS, UNSPECIFIED ORGANISM: ICD-10-CM

## 2023-08-10 DIAGNOSIS — E83.42 HYPOMAGNESEMIA: ICD-10-CM

## 2023-08-10 DIAGNOSIS — D69.6 THROMBOCYTOPENIA, UNSPECIFIED: ICD-10-CM

## 2023-08-16 ENCOUNTER — OUTPATIENT (OUTPATIENT)
Dept: OUTPATIENT SERVICES | Facility: HOSPITAL | Age: 48
LOS: 1 days | End: 2023-08-16
Payer: COMMERCIAL

## 2023-08-16 ENCOUNTER — APPOINTMENT (OUTPATIENT)
Dept: INTERNAL MEDICINE | Facility: CLINIC | Age: 48
End: 2023-08-16
Payer: COMMERCIAL

## 2023-08-16 VITALS
OXYGEN SATURATION: 99 % | HEIGHT: 59 IN | TEMPERATURE: 98.1 F | BODY MASS INDEX: 29.23 KG/M2 | HEART RATE: 77 BPM | WEIGHT: 145 LBS | DIASTOLIC BLOOD PRESSURE: 80 MMHG | SYSTOLIC BLOOD PRESSURE: 122 MMHG

## 2023-08-16 DIAGNOSIS — Z90.49 ACQUIRED ABSENCE OF OTHER SPECIFIED PARTS OF DIGESTIVE TRACT: Chronic | ICD-10-CM

## 2023-08-16 DIAGNOSIS — Z00.00 ENCOUNTER FOR GENERAL ADULT MEDICAL EXAMINATION WITHOUT ABNORMAL FINDINGS: ICD-10-CM

## 2023-08-16 PROCEDURE — 99214 OFFICE O/P EST MOD 30 MIN: CPT

## 2023-08-16 PROCEDURE — 99214 OFFICE O/P EST MOD 30 MIN: CPT | Mod: GC

## 2023-08-16 NOTE — HISTORY OF PRESENT ILLNESS
[FreeTextEntry1] : Follow up after hospital admission.  [de-identified] : 47 Y/F presented for follow-up after recent hospital admission for Anaplasmosis. Patient completed her oral dose of doxycycline. She is scheduled for for a followup with ID this coming week. All other symptoms except fatigue resolved after admission.

## 2023-08-16 NOTE — ED ADULT NURSE NOTE - SUICIDE SCREENING QUESTION 3
If you go to the left side of chart, its called story board, go where it says code section & double click on it & it should go to [facility] tab (under health care decision maker section) it says Capacity to Make Own Care Decision. Once you're in the list of items you should have the option to click \"incapacitated, right now it says \"full capacity\" (I did try and change it but it needs to be changed by provider). If you need help getting to this, please let me know & I can show you. No

## 2023-08-16 NOTE — HISTORY OF PRESENT ILLNESS
[FreeTextEntry1] : Follow up after hospital admission.  [de-identified] : 47 Y/F presented for follow-up after recent hospital admission for Anaplasmosis. Patient completed her oral dose of doxycycline. She is scheduled for for a followup with ID this coming week. All other symptoms except fatigue resolved after admission.

## 2023-08-16 NOTE — ASSESSMENT
[FreeTextEntry1] : 47 Y/F presented for follow-up after recent hospital admission for Anaplasmosis and health maintenance.   #Recent admission for Anaplasmosis #Persistent elevated LFTs - Referred to hepatology for persistently elevated LFTs - CMP sent   #Health Maintenance - Referral to GI for routine colonoscopy - Referral to GYN for routine health check up - Referral for routine mammogram - Routine labs sent (HbA1c, CBC, Lipid Profile, CMP) - F/U in 1 month

## 2023-08-22 DIAGNOSIS — R79.89 OTHER SPECIFIED ABNORMAL FINDINGS OF BLOOD CHEMISTRY: ICD-10-CM

## 2023-08-22 DIAGNOSIS — Z00.00 ENCOUNTER FOR GENERAL ADULT MEDICAL EXAMINATION WITHOUT ABNORMAL FINDINGS: ICD-10-CM

## 2023-08-22 DIAGNOSIS — R25.2 CRAMP AND SPASM: ICD-10-CM

## 2023-08-22 DIAGNOSIS — E78.5 HYPERLIPIDEMIA, UNSPECIFIED: ICD-10-CM

## 2023-08-22 DIAGNOSIS — I10 ESSENTIAL (PRIMARY) HYPERTENSION: ICD-10-CM

## 2023-09-01 ENCOUNTER — OUTPATIENT (OUTPATIENT)
Dept: OUTPATIENT SERVICES | Facility: HOSPITAL | Age: 48
LOS: 1 days | End: 2023-09-01
Payer: COMMERCIAL

## 2023-09-01 DIAGNOSIS — Z00.00 ENCOUNTER FOR GENERAL ADULT MEDICAL EXAMINATION WITHOUT ABNORMAL FINDINGS: ICD-10-CM

## 2023-09-01 DIAGNOSIS — Z90.49 ACQUIRED ABSENCE OF OTHER SPECIFIED PARTS OF DIGESTIVE TRACT: Chronic | ICD-10-CM

## 2023-09-01 PROCEDURE — 83036 HEMOGLOBIN GLYCOSYLATED A1C: CPT

## 2023-09-01 PROCEDURE — 80053 COMPREHEN METABOLIC PANEL: CPT

## 2023-09-01 PROCEDURE — 80061 LIPID PANEL: CPT

## 2023-09-01 PROCEDURE — 85027 COMPLETE CBC AUTOMATED: CPT

## 2023-09-01 PROCEDURE — 84443 ASSAY THYROID STIM HORMONE: CPT

## 2023-09-02 DIAGNOSIS — Z00.00 ENCOUNTER FOR GENERAL ADULT MEDICAL EXAMINATION WITHOUT ABNORMAL FINDINGS: ICD-10-CM

## 2023-09-06 LAB
ALBUMIN SERPL ELPH-MCNC: 4.2 G/DL
ALP BLD-CCNC: 76 U/L
ALT SERPL-CCNC: 20 U/L
ANION GAP SERPL CALC-SCNC: 10 MMOL/L
AST SERPL-CCNC: 23 U/L
BILIRUB SERPL-MCNC: 0.4 MG/DL
BUN SERPL-MCNC: 7 MG/DL
CALCIUM SERPL-MCNC: 8.9 MG/DL
CHLORIDE SERPL-SCNC: 108 MMOL/L
CHOLEST SERPL-MCNC: 208 MG/DL
CO2 SERPL-SCNC: 23 MMOL/L
CREAT SERPL-MCNC: 0.6 MG/DL
EGFR: 111 ML/MIN/1.73M2
ESTIMATED AVERAGE GLUCOSE: 105 MG/DL
GLUCOSE SERPL-MCNC: 98 MG/DL
HBA1C MFR BLD HPLC: 5.3 %
HDLC SERPL-MCNC: 42 MG/DL
LDLC SERPL CALC-MCNC: 128 MG/DL
NONHDLC SERPL-MCNC: 166 MG/DL
POTASSIUM SERPL-SCNC: 4.8 MMOL/L
PROT SERPL-MCNC: 6.6 G/DL
SODIUM SERPL-SCNC: 141 MMOL/L
TRIGL SERPL-MCNC: 188 MG/DL
TSH SERPL-ACNC: 2.92 UIU/ML

## 2023-09-20 ENCOUNTER — OUTPATIENT (OUTPATIENT)
Dept: OUTPATIENT SERVICES | Facility: HOSPITAL | Age: 48
LOS: 1 days | End: 2023-09-20
Payer: COMMERCIAL

## 2023-09-20 ENCOUNTER — APPOINTMENT (OUTPATIENT)
Dept: INTERNAL MEDICINE | Facility: CLINIC | Age: 48
End: 2023-09-20
Payer: COMMERCIAL

## 2023-09-20 VITALS
TEMPERATURE: 97.3 F | BODY MASS INDEX: 28.63 KG/M2 | OXYGEN SATURATION: 99 % | DIASTOLIC BLOOD PRESSURE: 91 MMHG | HEIGHT: 59 IN | WEIGHT: 142 LBS | SYSTOLIC BLOOD PRESSURE: 127 MMHG | HEART RATE: 77 BPM

## 2023-09-20 DIAGNOSIS — M79.672 PAIN IN RIGHT FOOT: ICD-10-CM

## 2023-09-20 DIAGNOSIS — Z90.49 ACQUIRED ABSENCE OF OTHER SPECIFIED PARTS OF DIGESTIVE TRACT: Chronic | ICD-10-CM

## 2023-09-20 DIAGNOSIS — R53.83 OTHER FATIGUE: ICD-10-CM

## 2023-09-20 DIAGNOSIS — Z23 ENCOUNTER FOR IMMUNIZATION: ICD-10-CM

## 2023-09-20 DIAGNOSIS — M79.671 PAIN IN RIGHT FOOT: ICD-10-CM

## 2023-09-20 DIAGNOSIS — Z00.00 ENCOUNTER FOR GENERAL ADULT MEDICAL EXAMINATION WITHOUT ABNORMAL FINDINGS: ICD-10-CM

## 2023-09-20 PROCEDURE — 90686 IIV4 VACC NO PRSV 0.5 ML IM: CPT

## 2023-09-20 PROCEDURE — 99214 OFFICE O/P EST MOD 30 MIN: CPT

## 2023-09-20 PROCEDURE — 99214 OFFICE O/P EST MOD 30 MIN: CPT | Mod: GC

## 2023-09-20 PROCEDURE — 90471 IMMUNIZATION ADMIN: CPT | Mod: 25

## 2023-09-20 RX ORDER — TERCONAZOLE 4 MG/G
0.4 CREAM VAGINAL
Qty: 1 | Refills: 1 | Status: COMPLETED | COMMUNITY
Start: 2018-01-18 | End: 2023-09-20

## 2023-09-20 RX ORDER — METHYLPREDNISOLONE 4 MG/1
4 TABLET ORAL
Qty: 1 | Refills: 0 | Status: COMPLETED | COMMUNITY
Start: 2023-05-10 | End: 2023-09-20

## 2023-09-20 RX ORDER — POLYETHYLENE GLYCOL 3350 AND ELECTROLYTES WITH LEMON FLAVOR 236; 22.74; 6.74; 5.86; 2.97 G/4L; G/4L; G/4L; G/4L; G/4L
236 POWDER, FOR SOLUTION ORAL
Qty: 1 | Refills: 0 | Status: COMPLETED | COMMUNITY
Start: 2022-10-19 | End: 2023-09-20

## 2023-09-20 RX ORDER — OXYBUTYNIN CHLORIDE 5 MG/1
5 TABLET ORAL
Qty: 90 | Refills: 0 | Status: COMPLETED | COMMUNITY
Start: 2017-02-06 | End: 2023-09-20

## 2023-09-20 RX ORDER — MELOXICAM 15 MG/1
15 TABLET ORAL DAILY
Qty: 20 | Refills: 2 | Status: COMPLETED | COMMUNITY
Start: 2017-11-16 | End: 2023-09-20

## 2023-09-20 RX ORDER — MELOXICAM 7.5 MG/1
7.5 TABLET ORAL DAILY
Qty: 30 | Refills: 0 | Status: COMPLETED | COMMUNITY
Start: 2019-10-02 | End: 2023-09-20

## 2023-09-20 RX ORDER — FLUCONAZOLE 150 MG/1
150 TABLET ORAL
Qty: 1 | Refills: 0 | Status: COMPLETED | COMMUNITY
Start: 2018-01-18 | End: 2023-09-20

## 2023-09-21 DIAGNOSIS — Z00.00 ENCOUNTER FOR GENERAL ADULT MEDICAL EXAMINATION WITHOUT ABNORMAL FINDINGS: ICD-10-CM

## 2023-09-21 DIAGNOSIS — Z23 ENCOUNTER FOR IMMUNIZATION: ICD-10-CM

## 2023-09-21 DIAGNOSIS — M79.671 PAIN IN RIGHT FOOT: ICD-10-CM

## 2023-09-21 DIAGNOSIS — R53.83 OTHER FATIGUE: ICD-10-CM

## 2023-09-21 DIAGNOSIS — A77.49 OTHER EHRLICHIOSIS: ICD-10-CM

## 2023-10-13 ENCOUNTER — RESULT REVIEW (OUTPATIENT)
Age: 48
End: 2023-10-13

## 2023-10-13 ENCOUNTER — OUTPATIENT (OUTPATIENT)
Dept: OUTPATIENT SERVICES | Facility: HOSPITAL | Age: 48
LOS: 1 days | End: 2023-10-13
Payer: COMMERCIAL

## 2023-10-13 DIAGNOSIS — Z12.31 ENCOUNTER FOR SCREENING MAMMOGRAM FOR MALIGNANT NEOPLASM OF BREAST: ICD-10-CM

## 2023-10-13 DIAGNOSIS — Z90.49 ACQUIRED ABSENCE OF OTHER SPECIFIED PARTS OF DIGESTIVE TRACT: Chronic | ICD-10-CM

## 2023-10-13 PROCEDURE — 77067 SCR MAMMO BI INCL CAD: CPT | Mod: 26

## 2023-10-13 PROCEDURE — 77063 BREAST TOMOSYNTHESIS BI: CPT

## 2023-10-13 PROCEDURE — 77063 BREAST TOMOSYNTHESIS BI: CPT | Mod: 26

## 2023-10-13 PROCEDURE — 77067 SCR MAMMO BI INCL CAD: CPT

## 2023-10-14 DIAGNOSIS — Z12.31 ENCOUNTER FOR SCREENING MAMMOGRAM FOR MALIGNANT NEOPLASM OF BREAST: ICD-10-CM

## 2023-11-13 ENCOUNTER — APPOINTMENT (OUTPATIENT)
Dept: INTERNAL MEDICINE | Facility: CLINIC | Age: 48
End: 2023-11-13
Payer: COMMERCIAL

## 2023-11-13 ENCOUNTER — OUTPATIENT (OUTPATIENT)
Dept: OUTPATIENT SERVICES | Facility: HOSPITAL | Age: 48
LOS: 1 days | End: 2023-11-13
Payer: COMMERCIAL

## 2023-11-13 VITALS
HEIGHT: 59 IN | BODY MASS INDEX: 30.04 KG/M2 | OXYGEN SATURATION: 100 % | TEMPERATURE: 98.1 F | SYSTOLIC BLOOD PRESSURE: 131 MMHG | WEIGHT: 149 LBS | DIASTOLIC BLOOD PRESSURE: 85 MMHG | HEART RATE: 74 BPM

## 2023-11-13 DIAGNOSIS — Z00.00 ENCOUNTER FOR GENERAL ADULT MEDICAL EXAMINATION W/OUT ABNORMAL FINDINGS: ICD-10-CM

## 2023-11-13 DIAGNOSIS — Z00.00 ENCOUNTER FOR GENERAL ADULT MEDICAL EXAMINATION WITHOUT ABNORMAL FINDINGS: ICD-10-CM

## 2023-11-13 DIAGNOSIS — Z90.49 ACQUIRED ABSENCE OF OTHER SPECIFIED PARTS OF DIGESTIVE TRACT: Chronic | ICD-10-CM

## 2023-11-13 PROCEDURE — 99214 OFFICE O/P EST MOD 30 MIN: CPT

## 2023-11-13 RX ORDER — HYDROCORTISONE AND ACETIC ACID OTIC 20.75; 10.375 MG/ML; MG/ML
1-2 SOLUTION AURICULAR (OTIC)
Qty: 1 | Refills: 0 | Status: ACTIVE | COMMUNITY
Start: 2023-11-13 | End: 1900-01-01

## 2023-11-13 RX ORDER — OMEPRAZOLE 20 MG/1
20 TABLET, DELAYED RELEASE ORAL DAILY
Qty: 14 | Refills: 0 | Status: ACTIVE | COMMUNITY
Start: 2023-11-13 | End: 1900-01-01

## 2023-11-16 DIAGNOSIS — Z00.00 ENCOUNTER FOR GENERAL ADULT MEDICAL EXAMINATION WITHOUT ABNORMAL FINDINGS: ICD-10-CM

## 2023-11-16 DIAGNOSIS — Z01.419 ENCOUNTER FOR GYNECOLOGICAL EXAMINATION (GENERAL) (ROUTINE) WITHOUT ABNORMAL FINDINGS: ICD-10-CM

## 2023-11-16 DIAGNOSIS — E78.5 HYPERLIPIDEMIA, UNSPECIFIED: ICD-10-CM

## 2023-11-27 ENCOUNTER — OUTPATIENT (OUTPATIENT)
Dept: OUTPATIENT SERVICES | Facility: HOSPITAL | Age: 48
LOS: 1 days | End: 2023-11-27
Payer: COMMERCIAL

## 2023-11-27 ENCOUNTER — APPOINTMENT (OUTPATIENT)
Age: 48
End: 2023-11-27
Payer: COMMERCIAL

## 2023-11-27 VITALS
HEART RATE: 76 BPM | SYSTOLIC BLOOD PRESSURE: 125 MMHG | DIASTOLIC BLOOD PRESSURE: 78 MMHG | HEIGHT: 59 IN | WEIGHT: 150 LBS | BODY MASS INDEX: 30.24 KG/M2

## 2023-11-27 DIAGNOSIS — A77.49 OTHER EHRLICHIOSIS: ICD-10-CM

## 2023-11-27 DIAGNOSIS — E66.3 OVERWEIGHT: ICD-10-CM

## 2023-11-27 DIAGNOSIS — E66.01 MORBID (SEVERE) OBESITY DUE TO EXCESS CALORIES: ICD-10-CM

## 2023-11-27 DIAGNOSIS — Z00.00 ENCOUNTER FOR GENERAL ADULT MEDICAL EXAMINATION WITHOUT ABNORMAL FINDINGS: ICD-10-CM

## 2023-11-27 DIAGNOSIS — Z90.49 ACQUIRED ABSENCE OF OTHER SPECIFIED PARTS OF DIGESTIVE TRACT: Chronic | ICD-10-CM

## 2023-11-27 DIAGNOSIS — R79.89 OTHER SPECIFIED ABNORMAL FINDINGS OF BLOOD CHEMISTRY: ICD-10-CM

## 2023-11-27 PROCEDURE — 99214 OFFICE O/P EST MOD 30 MIN: CPT

## 2023-11-27 PROCEDURE — 99203 OFFICE O/P NEW LOW 30 MIN: CPT

## 2023-11-29 DIAGNOSIS — R74.8 ABNORMAL LEVELS OF OTHER SERUM ENZYMES: ICD-10-CM

## 2023-11-29 DIAGNOSIS — A77.49 OTHER EHRLICHIOSIS: ICD-10-CM

## 2023-11-29 DIAGNOSIS — R79.89 OTHER SPECIFIED ABNORMAL FINDINGS OF BLOOD CHEMISTRY: ICD-10-CM

## 2023-11-29 DIAGNOSIS — E66.9 OBESITY, UNSPECIFIED: ICD-10-CM

## 2023-12-01 ENCOUNTER — OUTPATIENT (OUTPATIENT)
Dept: OUTPATIENT SERVICES | Facility: HOSPITAL | Age: 48
LOS: 1 days | End: 2023-12-01
Payer: COMMERCIAL

## 2023-12-01 ENCOUNTER — APPOINTMENT (OUTPATIENT)
Dept: OBGYN | Facility: CLINIC | Age: 48
End: 2023-12-01
Payer: COMMERCIAL

## 2023-12-01 VITALS
DIASTOLIC BLOOD PRESSURE: 80 MMHG | WEIGHT: 148 LBS | HEIGHT: 59 IN | BODY MASS INDEX: 29.84 KG/M2 | SYSTOLIC BLOOD PRESSURE: 120 MMHG

## 2023-12-01 DIAGNOSIS — Z01.419 ENCOUNTER FOR GYNECOLOGICAL EXAMINATION (GENERAL) (ROUTINE) W/OUT ABNORMAL FINDINGS: ICD-10-CM

## 2023-12-01 DIAGNOSIS — Z01.419 ENCOUNTER FOR GYNECOLOGICAL EXAMINATION (GENERAL) (ROUTINE) WITHOUT ABNORMAL FINDINGS: ICD-10-CM

## 2023-12-01 DIAGNOSIS — Z90.49 ACQUIRED ABSENCE OF OTHER SPECIFIED PARTS OF DIGESTIVE TRACT: Chronic | ICD-10-CM

## 2023-12-01 PROCEDURE — 99396 PREV VISIT EST AGE 40-64: CPT

## 2023-12-04 DIAGNOSIS — Z01.419 ENCOUNTER FOR GYNECOLOGICAL EXAMINATION (GENERAL) (ROUTINE) WITHOUT ABNORMAL FINDINGS: ICD-10-CM

## 2023-12-14 ENCOUNTER — APPOINTMENT (OUTPATIENT)
Dept: GASTROENTEROLOGY | Facility: CLINIC | Age: 48
End: 2023-12-14
Payer: MEDICAID

## 2023-12-14 DIAGNOSIS — E66.9 OBESITY, UNSPECIFIED: ICD-10-CM

## 2023-12-14 DIAGNOSIS — R74.8 ABNORMAL LEVELS OF OTHER SERUM ENZYMES: ICD-10-CM

## 2023-12-14 DIAGNOSIS — E78.5 HYPERLIPIDEMIA, UNSPECIFIED: ICD-10-CM

## 2023-12-14 PROCEDURE — 91200 LIVER ELASTOGRAPHY: CPT

## 2024-01-10 ENCOUNTER — OUTPATIENT (OUTPATIENT)
Dept: OUTPATIENT SERVICES | Facility: HOSPITAL | Age: 49
LOS: 1 days | End: 2024-01-10
Payer: COMMERCIAL

## 2024-01-10 ENCOUNTER — APPOINTMENT (OUTPATIENT)
Dept: OPHTHALMOLOGY | Facility: CLINIC | Age: 49
End: 2024-01-10
Payer: COMMERCIAL

## 2024-01-10 DIAGNOSIS — H53.8 OTHER VISUAL DISTURBANCES: ICD-10-CM

## 2024-01-10 DIAGNOSIS — Z90.49 ACQUIRED ABSENCE OF OTHER SPECIFIED PARTS OF DIGESTIVE TRACT: Chronic | ICD-10-CM

## 2024-01-10 PROCEDURE — 92134 CPTRZ OPH DX IMG PST SGM RTA: CPT

## 2024-01-10 PROCEDURE — 92134 CPTRZ OPH DX IMG PST SGM RTA: CPT | Mod: 26

## 2024-01-10 PROCEDURE — 92004 COMPRE OPH EXAM NEW PT 1/>: CPT

## 2024-01-12 ENCOUNTER — APPOINTMENT (OUTPATIENT)
Dept: OBGYN | Facility: CLINIC | Age: 49
End: 2024-01-12
Payer: COMMERCIAL

## 2024-01-12 ENCOUNTER — OUTPATIENT (OUTPATIENT)
Dept: OUTPATIENT SERVICES | Facility: HOSPITAL | Age: 49
LOS: 1 days | End: 2024-01-12
Payer: COMMERCIAL

## 2024-01-12 VITALS
WEIGHT: 147 LBS | DIASTOLIC BLOOD PRESSURE: 52 MMHG | SYSTOLIC BLOOD PRESSURE: 116 MMHG | BODY MASS INDEX: 29.64 KG/M2 | HEIGHT: 59 IN

## 2024-01-12 DIAGNOSIS — Z00.00 ENCOUNTER FOR GENERAL ADULT MEDICAL EXAMINATION WITHOUT ABNORMAL FINDINGS: ICD-10-CM

## 2024-01-12 DIAGNOSIS — Z90.49 ACQUIRED ABSENCE OF OTHER SPECIFIED PARTS OF DIGESTIVE TRACT: Chronic | ICD-10-CM

## 2024-01-12 PROCEDURE — T1013: CPT

## 2024-01-12 PROCEDURE — 99214 OFFICE O/P EST MOD 30 MIN: CPT | Mod: 25

## 2024-01-12 PROCEDURE — 99214 OFFICE O/P EST MOD 30 MIN: CPT

## 2024-01-12 PROCEDURE — 57135 EXCISION VAGINAL CYST/TUMOR: CPT

## 2024-01-12 PROCEDURE — 88305 TISSUE EXAM BY PATHOLOGIST: CPT

## 2024-01-12 PROCEDURE — 81025 URINE PREGNANCY TEST: CPT

## 2024-01-12 NOTE — DISCUSSION/SUMMARY
[FreeTextEntry1] : 49yo P4, s/p cyst removal in the office  - aftercare instructions given - RTC in 1 week

## 2024-01-12 NOTE — PHYSICAL EXAM
[Chaperone Present] : A chaperone was present in the examining room during all aspects of the physical examination [Appropriately responsive] : appropriately responsive [Alert] : alert [No Acute Distress] : no acute distress [Soft] : soft [Non-tender] : non-tender [Non-distended] : non-distended [No Lesions] : no lesions [Oriented x3] : oriented x3 [Labia Majora] : normal [Labia Minora] : normal [Normal] : normal [FreeTextEntry4] : small 1cm inclusion cyst at vaginal introitus at 5 oclock

## 2024-01-12 NOTE — HISTORY OF PRESENT ILLNESS
[FreeTextEntry1] :  #[id number]  49yo P4 presents to clinic for vaginal inclusion cyst removal. Patient reports that it is persistently bothering her and she would live it removed. Denies pain. Does not feel as though it has grown in size in 3 years, however, it is uncomfortable and she would like it removed.

## 2024-01-18 DIAGNOSIS — H02.88A MEIBOMIAN GLAND DYSFUNCTION RIGHT EYE, UPPER AND LOWER EYELIDS: ICD-10-CM

## 2024-01-18 DIAGNOSIS — H04.123 DRY EYE SYNDROME OF BILATERAL LACRIMAL GLANDS: ICD-10-CM

## 2024-01-18 DIAGNOSIS — H35.89 OTHER SPECIFIED RETINAL DISORDERS: ICD-10-CM

## 2024-01-18 DIAGNOSIS — H02.88B MEIBOMIAN GLAND DYSFUNCTION LEFT EYE, UPPER AND LOWER EYELIDS: ICD-10-CM

## 2024-01-18 DIAGNOSIS — H11.053 PERIPHERAL PTERYGIUM, PROGRESSIVE, BILATERAL: ICD-10-CM

## 2024-01-19 ENCOUNTER — OUTPATIENT (OUTPATIENT)
Dept: OUTPATIENT SERVICES | Facility: HOSPITAL | Age: 49
LOS: 1 days | End: 2024-01-19
Payer: COMMERCIAL

## 2024-01-19 ENCOUNTER — APPOINTMENT (OUTPATIENT)
Dept: OBGYN | Facility: CLINIC | Age: 49
End: 2024-01-19
Payer: COMMERCIAL

## 2024-01-19 VITALS
SYSTOLIC BLOOD PRESSURE: 110 MMHG | HEIGHT: 59 IN | DIASTOLIC BLOOD PRESSURE: 70 MMHG | WEIGHT: 147 LBS | BODY MASS INDEX: 29.64 KG/M2

## 2024-01-19 DIAGNOSIS — Z90.49 ACQUIRED ABSENCE OF OTHER SPECIFIED PARTS OF DIGESTIVE TRACT: Chronic | ICD-10-CM

## 2024-01-19 DIAGNOSIS — N89.8 OTHER SPECIFIED NONINFLAMMATORY DISORDERS OF VAGINA: ICD-10-CM

## 2024-01-19 DIAGNOSIS — Z00.00 ENCOUNTER FOR GENERAL ADULT MEDICAL EXAMINATION WITHOUT ABNORMAL FINDINGS: ICD-10-CM

## 2024-01-19 LAB — CORE LAB BIOPSY: NORMAL

## 2024-01-19 PROCEDURE — 99024 POSTOP FOLLOW-UP VISIT: CPT

## 2024-01-19 PROCEDURE — 99213 OFFICE O/P EST LOW 20 MIN: CPT

## 2024-01-19 PROCEDURE — T1013: CPT

## 2024-01-22 DIAGNOSIS — N89.8 OTHER SPECIFIED NONINFLAMMATORY DISORDERS OF VAGINA: ICD-10-CM

## 2024-01-22 NOTE — PHYSICAL EXAM
[Chaperone Present] : A chaperone was present in the examining room during all aspects of the physical examination [Normal] : normal external genitalia [FreeTextEntry1] : well healing incision with vicryl stitches in place. no erythema or drainage

## 2024-01-22 NOTE — HISTORY OF PRESENT ILLNESS
[FreeTextEntry1] :  #[id number]  47yo P4 s/p vaginal cyst removal last week in clinic. Patient doing well. Reports that she is not having any pain. Denies vaginal bleeding, abnormal discharge.

## 2024-01-22 NOTE — DISCUSSION/SUMMARY
[FreeTextEntry1] : A/P: 47yo P4 s/p vaginal cyst removal, healing well pathology benign RTC for annual or PRN

## 2024-01-25 DIAGNOSIS — N89.8 OTHER SPECIFIED NONINFLAMMATORY DISORDERS OF VAGINA: ICD-10-CM

## 2024-06-03 ENCOUNTER — APPOINTMENT (OUTPATIENT)
Age: 49
End: 2024-06-03

## 2024-10-16 ENCOUNTER — APPOINTMENT (OUTPATIENT)
Dept: OPHTHALMOLOGY | Facility: CLINIC | Age: 49
End: 2024-10-16
Payer: COMMERCIAL

## 2024-10-16 ENCOUNTER — OUTPATIENT (OUTPATIENT)
Dept: OUTPATIENT SERVICES | Facility: HOSPITAL | Age: 49
LOS: 1 days | End: 2024-10-16
Payer: COMMERCIAL

## 2024-10-16 DIAGNOSIS — H53.8 OTHER VISUAL DISTURBANCES: ICD-10-CM

## 2024-10-16 PROCEDURE — 92134 CPTRZ OPH DX IMG PST SGM RTA: CPT | Mod: 26

## 2024-10-16 PROCEDURE — 92014 COMPRE OPH EXAM EST PT 1/>: CPT

## 2024-10-16 PROCEDURE — 92134 CPTRZ OPH DX IMG PST SGM RTA: CPT

## 2024-12-06 ENCOUNTER — APPOINTMENT (OUTPATIENT)
Dept: OBGYN | Facility: CLINIC | Age: 49
End: 2024-12-06

## 2024-12-18 ENCOUNTER — OUTPATIENT (OUTPATIENT)
Dept: OUTPATIENT SERVICES | Facility: HOSPITAL | Age: 49
LOS: 1 days | End: 2024-12-18
Payer: COMMERCIAL

## 2024-12-18 DIAGNOSIS — Z01.20 ENCOUNTER FOR DENTAL EXAMINATION AND CLEANING WITHOUT ABNORMAL FINDINGS: ICD-10-CM

## 2024-12-18 DIAGNOSIS — Z90.49 ACQUIRED ABSENCE OF OTHER SPECIFIED PARTS OF DIGESTIVE TRACT: Chronic | ICD-10-CM

## 2024-12-18 PROCEDURE — D0274: CPT

## 2024-12-18 PROCEDURE — D0230: CPT

## 2024-12-18 PROCEDURE — T1013: CPT

## 2024-12-18 PROCEDURE — D1110: CPT

## 2024-12-18 PROCEDURE — D0120: CPT

## 2024-12-18 PROCEDURE — D0330: CPT

## 2024-12-19 DIAGNOSIS — Z01.21 ENCOUNTER FOR DENTAL EXAMINATION AND CLEANING WITH ABNORMAL FINDINGS: ICD-10-CM

## 2024-12-27 ENCOUNTER — OUTPATIENT (OUTPATIENT)
Dept: OUTPATIENT SERVICES | Facility: HOSPITAL | Age: 49
LOS: 1 days | End: 2024-12-27
Payer: COMMERCIAL

## 2024-12-27 DIAGNOSIS — K02.52 DENTAL CARIES ON PIT AND FISSURE SURFACE PENETRATING INTO DENTIN: ICD-10-CM

## 2024-12-27 DIAGNOSIS — Z90.49 ACQUIRED ABSENCE OF OTHER SPECIFIED PARTS OF DIGESTIVE TRACT: Chronic | ICD-10-CM

## 2024-12-27 PROCEDURE — D2140: CPT

## 2025-01-03 DIAGNOSIS — Z01.21 ENCOUNTER FOR DENTAL EXAMINATION AND CLEANING WITH ABNORMAL FINDINGS: ICD-10-CM

## 2025-01-24 ENCOUNTER — APPOINTMENT (OUTPATIENT)
Dept: OBGYN | Facility: CLINIC | Age: 50
End: 2025-01-24

## 2025-01-24 ENCOUNTER — NON-APPOINTMENT (OUTPATIENT)
Age: 50
End: 2025-01-24

## 2025-01-24 ENCOUNTER — OUTPATIENT (OUTPATIENT)
Dept: OUTPATIENT SERVICES | Facility: HOSPITAL | Age: 50
LOS: 1 days | End: 2025-01-24
Payer: COMMERCIAL

## 2025-01-24 VITALS
WEIGHT: 143 LBS | DIASTOLIC BLOOD PRESSURE: 70 MMHG | SYSTOLIC BLOOD PRESSURE: 110 MMHG | BODY MASS INDEX: 28.83 KG/M2 | HEIGHT: 59 IN

## 2025-01-24 DIAGNOSIS — N89.8 OTHER SPECIFIED NONINFLAMMATORY DISORDERS OF VAGINA: ICD-10-CM

## 2025-01-24 DIAGNOSIS — Z01.419 ENCOUNTER FOR GYNECOLOGICAL EXAMINATION (GENERAL) (ROUTINE) W/OUT ABNORMAL FINDINGS: ICD-10-CM

## 2025-01-24 DIAGNOSIS — Z01.419 ENCOUNTER FOR GYNECOLOGICAL EXAMINATION (GENERAL) (ROUTINE) WITHOUT ABNORMAL FINDINGS: ICD-10-CM

## 2025-01-24 DIAGNOSIS — Z90.49 ACQUIRED ABSENCE OF OTHER SPECIFIED PARTS OF DIGESTIVE TRACT: Chronic | ICD-10-CM

## 2025-01-24 PROCEDURE — 99386 PREV VISIT NEW AGE 40-64: CPT

## 2025-01-24 PROCEDURE — 99459 PELVIC EXAMINATION: CPT

## 2025-01-24 PROCEDURE — T1013: CPT

## 2025-01-24 PROCEDURE — 99396 PREV VISIT EST AGE 40-64: CPT

## 2025-01-24 RX ORDER — IBUPROFEN 800 MG/1
800 TABLET, FILM COATED ORAL
Qty: 30 | Refills: 0 | Status: ACTIVE | COMMUNITY
Start: 2025-01-24 | End: 1900-01-01

## 2025-01-24 RX ORDER — GLYCERIN/MIN OIL/POLYCARBOPHIL
GEL WITH APPLICATOR (GRAM) VAGINAL
Qty: 1 | Refills: 3 | Status: ACTIVE | COMMUNITY
Start: 2025-01-24 | End: 1900-01-01

## 2025-01-27 DIAGNOSIS — Z01.419 ENCOUNTER FOR GYNECOLOGICAL EXAMINATION (GENERAL) (ROUTINE) WITHOUT ABNORMAL FINDINGS: ICD-10-CM

## 2025-01-27 DIAGNOSIS — N89.8 OTHER SPECIFIED NONINFLAMMATORY DISORDERS OF VAGINA: ICD-10-CM

## 2025-02-05 ENCOUNTER — OUTPATIENT (OUTPATIENT)
Dept: OUTPATIENT SERVICES | Facility: HOSPITAL | Age: 50
LOS: 1 days | End: 2025-02-05
Payer: COMMERCIAL

## 2025-02-05 DIAGNOSIS — K01.1 IMPACTED TEETH: ICD-10-CM

## 2025-02-05 DIAGNOSIS — Z90.49 ACQUIRED ABSENCE OF OTHER SPECIFIED PARTS OF DIGESTIVE TRACT: Chronic | ICD-10-CM

## 2025-02-05 PROCEDURE — D7230: CPT

## 2025-02-06 DIAGNOSIS — K05.6 PERIODONTAL DISEASE, UNSPECIFIED: ICD-10-CM

## 2025-04-04 ENCOUNTER — OUTPATIENT (OUTPATIENT)
Dept: OUTPATIENT SERVICES | Facility: HOSPITAL | Age: 50
LOS: 1 days | End: 2025-04-04
Payer: COMMERCIAL

## 2025-04-04 DIAGNOSIS — Z90.49 ACQUIRED ABSENCE OF OTHER SPECIFIED PARTS OF DIGESTIVE TRACT: Chronic | ICD-10-CM

## 2025-04-04 DIAGNOSIS — K01.1 IMPACTED TEETH: ICD-10-CM

## 2025-04-04 PROCEDURE — D9310: CPT

## 2025-04-08 DIAGNOSIS — K08.409 PARTIAL LOSS OF TEETH, UNSPECIFIED CAUSE, UNSPECIFIED CLASS: ICD-10-CM

## 2025-04-08 DIAGNOSIS — K01.1 IMPACTED TEETH: ICD-10-CM

## 2025-04-16 ENCOUNTER — APPOINTMENT (OUTPATIENT)
Dept: OPHTHALMOLOGY | Facility: CLINIC | Age: 50
End: 2025-04-16

## 2025-05-02 ENCOUNTER — OUTPATIENT (OUTPATIENT)
Dept: OUTPATIENT SERVICES | Facility: HOSPITAL | Age: 50
LOS: 1 days | End: 2025-05-02

## 2025-05-02 DIAGNOSIS — Z90.49 ACQUIRED ABSENCE OF OTHER SPECIFIED PARTS OF DIGESTIVE TRACT: Chronic | ICD-10-CM

## 2025-05-02 DIAGNOSIS — K02.9 DENTAL CARIES, UNSPECIFIED: ICD-10-CM

## 2025-05-02 PROCEDURE — D9310: CPT

## 2025-05-03 DIAGNOSIS — K02.9 DENTAL CARIES, UNSPECIFIED: ICD-10-CM

## 2025-05-09 ENCOUNTER — OUTPATIENT (OUTPATIENT)
Dept: OUTPATIENT SERVICES | Facility: HOSPITAL | Age: 50
LOS: 1 days | End: 2025-05-09
Payer: COMMERCIAL

## 2025-05-09 DIAGNOSIS — Z90.49 ACQUIRED ABSENCE OF OTHER SPECIFIED PARTS OF DIGESTIVE TRACT: Chronic | ICD-10-CM

## 2025-05-09 DIAGNOSIS — K02.9 DENTAL CARIES, UNSPECIFIED: ICD-10-CM

## 2025-05-09 PROCEDURE — D9310: CPT

## 2025-05-21 NOTE — ED ADULT NURSE NOTE - NSIMPLEMENTINTERV_GEN_ALL_ED
Called patient to schedule procedure with Dr. Hanson    Date of Procedure: 6/23/25    Arrival time given: Yes: Arrival Time 8am      Procedure Location: [facility]     Verified Location with Patient:  Yes  Address provided to the patient     Pre-op H&P Required:  No: Local anesthesia       Post-Op/Follow Up Appt:  Not Indicated in Request      Informed patient they will need a  to drive them home:  Yes    Patients : Spouse     Patient is aware that pre-op RN from the procedure center will call 2-3 days prior to scheduled procedure to confirm arrival time and review any instructions:  Yes       Additional Comments: N/A        Thuy Kennedy MA on 5/21/2025 at 5:51 PM      P: [phone]    Implemented All Universal Safety Interventions:  [place]buck to call system. Call bell, personal items and telephone within reach. Instruct patient to call for assistance. Room bathroom lighting operational. Non-slip footwear when patient is off stretcher. Physically safe environment: no spills, clutter or unnecessary equipment. Stretcher in lowest position, wheels locked, appropriate side rails in place.